# Patient Record
Sex: FEMALE | Race: WHITE | NOT HISPANIC OR LATINO | Employment: OTHER | URBAN - METROPOLITAN AREA
[De-identification: names, ages, dates, MRNs, and addresses within clinical notes are randomized per-mention and may not be internally consistent; named-entity substitution may affect disease eponyms.]

---

## 2017-04-11 DIAGNOSIS — K21.9 GASTROESOPHAGEAL REFLUX DISEASE, ESOPHAGITIS PRESENCE NOT SPECIFIED: ICD-10-CM

## 2017-04-11 RX ORDER — PANTOPRAZOLE SODIUM 20 MG/1
20 TABLET, DELAYED RELEASE ORAL DAILY
Qty: 30 TABLET | Refills: 3 | Status: SHIPPED | OUTPATIENT
Start: 2017-04-11 | End: 2017-10-05 | Stop reason: SDUPTHER

## 2017-06-01 ENCOUNTER — OFFICE VISIT (OUTPATIENT)
Dept: INTERNAL MEDICINE | Facility: CLINIC | Age: 67
End: 2017-06-01
Payer: MEDICARE

## 2017-06-01 VITALS
WEIGHT: 244.06 LBS | HEART RATE: 86 BPM | TEMPERATURE: 97 F | BODY MASS INDEX: 43.24 KG/M2 | OXYGEN SATURATION: 97 % | SYSTOLIC BLOOD PRESSURE: 116 MMHG | HEIGHT: 63 IN | DIASTOLIC BLOOD PRESSURE: 74 MMHG

## 2017-06-01 DIAGNOSIS — Z01.818 PRE-OPERATIVE CLEARANCE: ICD-10-CM

## 2017-06-01 DIAGNOSIS — M17.0 PRIMARY OSTEOARTHRITIS OF BOTH KNEES: Primary | ICD-10-CM

## 2017-06-01 PROCEDURE — 99499 UNLISTED E&M SERVICE: CPT | Mod: S$GLB,,, | Performed by: FAMILY MEDICINE

## 2017-06-01 PROCEDURE — 99213 OFFICE O/P EST LOW 20 MIN: CPT | Mod: S$GLB,,, | Performed by: FAMILY MEDICINE

## 2017-06-01 PROCEDURE — 1126F AMNT PAIN NOTED NONE PRSNT: CPT | Mod: S$GLB,,, | Performed by: FAMILY MEDICINE

## 2017-06-01 PROCEDURE — 1159F MED LIST DOCD IN RCRD: CPT | Mod: S$GLB,,, | Performed by: FAMILY MEDICINE

## 2017-06-01 PROCEDURE — 99999 PR PBB SHADOW E&M-EST. PATIENT-LVL III: CPT | Mod: PBBFAC,,, | Performed by: FAMILY MEDICINE

## 2017-06-01 RX ORDER — ASPIRIN 81 MG/1
81 TABLET ORAL
COMMUNITY
End: 2017-09-14

## 2017-06-01 RX ORDER — MUPIROCIN 20 MG/G
OINTMENT TOPICAL
COMMUNITY
Start: 2017-05-30 | End: 2017-09-14

## 2017-06-01 RX ORDER — ACETAMINOPHEN 500 MG
500 TABLET ORAL
COMMUNITY
End: 2017-09-14

## 2017-06-01 RX ORDER — AMOXICILLIN 500 MG/1
CAPSULE ORAL
COMMUNITY
Start: 2017-05-27 | End: 2017-09-14

## 2017-06-01 NOTE — PROGRESS NOTES
Dory Xie  06/01/2017  [id number]    Eusebia Cohen MD  Patient Care Team:  Eusebia Cohen MD as PCP - General (Family Medicine)  Kely Salazar MD as Consulting Physician (Physical Medicine and Rehabilitation)  Has the patient seen any provider outside of the [facility] network since the last visit? (yes). If yes, HIPPA forms completed and records requested.  Dr. Larson Orth[facility]      Visit Type:Pre op clearance    Chief Complaint:  Chief Complaint   Patient presents with    Pre-op Exam     Surgery June 14, Knee Replacement, Dr.Brent Larson , Tingling In Ears       History of Present Illness:  67 year old here for pre op clearance.  She is patient of Dr. Cohen. This is my first visit with her.  She has history of right knee pain and arthritis. She has been under care of Ortho and has decided to proceed with right total knee.     She denies any issues today.  She has family history of CAD, but herself does not have any.    She takes Protonix for GERD.      She is Amoxil for dental work.  She was positive for MSSA, Dr. Waldrop office sent in Bactroban.       History:  Past Medical History:   Diagnosis Date    Arthritis     Cancer     cervical    Chronic pain     From osteoarthritis    Colitis     GERD (gastroesophageal reflux disease)     Seasonal allergies      Past Surgical History:   Procedure Laterality Date    COLONOSCOPY N/A 9/21/2016    Procedure: COLONOSCOPY;  Surgeon: Hector Summers III, MD;  Location: [facility];  Service: Endoscopy;  Laterality: N/A;    EYE SURGERY      HYSTERECTOMY      TONSILLECTOMY      TUBAL LIGATION       Family History   Problem Relation Age of Onset    Diabetes Mother     Heart disease Mother     Cancer Mother      lung    Cancer Father      Lung    Hyperlipidemia Father     Hypertension       Social History     Social History    Marital status: Single     Spouse name: N/A    Number of children: N/A    Years of education: N/A     Occupational  History    Not on file.     Social History Main Topics    Smoking status: Never Smoker    Smokeless tobacco: Never Used    Alcohol use No    Drug use: No    Sexual activity: Not on file     Other Topics Concern    Not on file     Social History Narrative    No narrative on file     Patient Active Problem List   Diagnosis    Primary osteoarthritis of both knees    Chronic pain    Hyperlipidemia    Seasonal allergies    Osteoporosis, unspecified    BMI of 40.0-44.9, adult    Chest pain, moderate coronary artery risk    Family history of arteriosclerotic cardiovascular disease    Preop cardiovascular exam    Colitis     Review of patient's allergies indicates:  No Known Allergies    The following were reviewed at this visit: active problem list, medication list, allergies, family history, social history, and health maintenance.    Medications:  Current Outpatient Prescriptions on File Prior to Visit   Medication Sig Dispense Refill    calcium-vitamin D 600 mg(1,500mg) -400 unit Tab Take 1 tablet by mouth 2 (two) times daily. 1 Tablet Oral      fluticasone (FLONASE) 50 mcg/actuation nasal spray 1 spray IN EACH NOSTRIL EVERY DAILY 16 g 5    meloxicam (MOBIC) 15 MG tablet Take 1 tablet by mouth once daily.      pantoprazole (PROTONIX) 20 MG tablet Take 1 tablet (20 mg total) by mouth once daily. 30 tablet 3    potassium gluconate 595 (99) mg Tab Take 1 tablet by mouth Daily.      tramadol (ULTRAM) 50 mg tablet Take 1 tablet by mouth 5 (five) times daily.       [DISCONTINUED] VOLTAREN 1 % Gel 1 Tube as needed.      [DISCONTINUED] alprazolam (XANAX) 0.25 MG tablet Take 1 tablet (0.25 mg total) by mouth 3 (three) times daily as needed for Anxiety. 15 tablet 0    [DISCONTINUED] aspirin 81 mg Tab Take 1 tablet by mouth Daily.      [DISCONTINUED] chlorhexidine (PERIDEX) 0.12 % solution        No current facility-administered medications on file prior to visit.        Medications have been reviewed  and reconciled with patient at this visit.  Barriers to medications present (no)    Adverse reactions to current medications (no)    Over the counter medications reviewed (Yes ), and if needed added to active Medication list at this visit.     Exam:  Wt Readings from Last 3 Encounters:   06/01/17 110.7 kg (244 lb 0.8 oz)   09/21/16 101.2 kg (223 lb)   09/12/16 101.4 kg (223 lb 8.7 oz)     Temp Readings from Last 3 Encounters:   06/01/17 97.4 °F (36.3 °C) (Tympanic)   09/21/16 98.4 °F (36.9 °C) (Oral)   09/10/16 98 °F (36.7 °C) (Oral)     BP Readings from Last 3 Encounters:   06/01/17 116/74   09/21/16 (!) 145/74   09/12/16 112/70     Pulse Readings from Last 3 Encounters:   06/01/17 86   09/21/16 61   09/12/16 70     Body mass index is 43.24 kg/m².      Review of Systems   Constitutional: Negative for chills and fever.   HENT: Negative for nosebleeds and sore throat.    Eyes: Negative for blurred vision and double vision.   Respiratory: Negative for cough, hemoptysis and shortness of breath.    Cardiovascular: Negative for chest pain, orthopnea and leg swelling.   Gastrointestinal: Negative for abdominal pain, heartburn, nausea and vomiting.   Genitourinary: Negative for dysuria, frequency and urgency.   Musculoskeletal: Positive for joint pain.   Skin: Negative for itching and rash.   Neurological: Negative for dizziness, tingling, focal weakness and headaches.   Psychiatric/Behavioral: Negative for depression. The patient is not nervous/anxious and does not have insomnia.        Physical Exam   Constitutional: She is oriented to person, place, and time. She appears well-developed and well-nourished. No distress.   HENT:   Head: Atraumatic.   Right Ear: External ear normal.   Left Ear: External ear normal.   Nose: Nose normal.   Mouth/Throat: No oropharyngeal exudate.   Eyes: Conjunctivae and EOM are normal. Pupils are equal, round, and reactive to light.   Neck: Normal range of motion. Neck supple. No thyromegaly  present.   Cardiovascular: Normal rate, regular rhythm and normal heart sounds.    No murmur heard.  Pulmonary/Chest: Effort normal and breath sounds normal. No respiratory distress. She has no wheezes. She exhibits no tenderness.   Abdominal: Soft. Bowel sounds are normal. She exhibits no distension. There is no tenderness. There is no rebound and no guarding.   Musculoskeletal: She exhibits tenderness.   Right knee, lateral joint line. Tenderness with full extension.  No swelling noted. No skin changes.    Lymphadenopathy:     She has no cervical adenopathy.   Neurological: She is alert and oriented to person, place, and time.   Skin: She is not diaphoretic.   Psychiatric: She has a normal mood and affect. Her behavior is normal. Thought content normal.   Nursing note and vitals reviewed.      Laboratory Reviewed ({Yes)  Lab Results   Component Value Date    WBC 5.37 09/10/2016    HGB 11.3 (L) 09/10/2016    HCT 36.3 (L) 09/10/2016     09/10/2016    CHOL 194 08/09/2016    TRIG 132 08/09/2016    HDL 33 (L) 08/09/2016    ALT 15 09/10/2016    AST 13 09/10/2016     09/10/2016    K 4.4 09/10/2016     09/10/2016    CREATININE 0.9 09/10/2016    BUN 18 09/10/2016    CO2 25 09/10/2016    TSH 3.415 02/19/2014    INR 1.1 09/10/2016    HGBA1C 5.4 02/19/2014       Assessment:  The primary encounter diagnosis was Primary osteoarthritis of both knees. A diagnosis of Pre-operative clearance was also pertinent to this visit.     Plan       Primary osteoarthritis of both knees  Pre-operative clearance   [facility].  I personally reviewed pre op labs, and they are accessible with Care Everywhere.   EKG is normal   Chest xray clear   Reviewed medications with patient. Holding NSAID and ASA 7 days prior   Rehab with home health and at home with assistance of daughter   Post op anticoag per Dr. Larson.   She has Bactroban for her nose, 5 days prior to surgery  Deemed adequate risk for right total  knee.      Care Plan/Goals: Reviewed  (Yes)  Goals      Blood Pressure < 140/90      LDL CHOLESTEROL < 130      Weight < 190 lb (86.183 kg)           Follow up: No Follow-up on file.    After visit summary was printed and given to patient upon discharge today.  Patient goals and care plan are included in After Visit Summary.

## 2017-06-01 NOTE — PATIENT INSTRUCTIONS
Understanding Osteoarthritis of the Knee    A joint is a place where two bones meet. The knee is called a hinge joint. This joint is formed where the thighbone (femur) meets the shinbone (tibia). A healthy knee joint bends freely. Knee osteoarthritis is a condition where parts of the knee joint wear out. This can lead to pain, stiffness, and limited movement.   What is osteoarthritis?  Every joint contains a smooth tissue called cartilage. Cartilage cushions the ends of bones and helps bones in a joint glide smoothly against each other. Knee osteoarthritis occurs when cartilage in the knee joint begins to break down and wear away. Bones may become exposed and rub together. The cartilage may become irritated and rough. This prevents smooth movement of the joint and can lead to pain.  Causes of osteoarthritis of the knee  Causes can include:  · Wear and tear from normal use over time  · Overuse of the knee during sports or work activities  · Being overweight. This increases stress on the knee joint.  · Misalignment of the knee joint  · Injury to the knee  Symptoms of osteoarthritis of the knee  Common symptoms include:  · Pain and swelling around the joint. The pain and swelling get worse with activity and better with rest.  · Grinding sound when moving the knee  · Reduced knee movement  · Knee stiffness. This is often worse first thing in the morning.  Treating osteoarthritis of the knee  Osteoarthritis is a long-term condition. Treatment usually focuses on managing symptoms. Treatment may include:  · Over-the-counter or prescription medicines taken by mouth to help relieve pain and swelling  · Injections of medicine into the joint to help relieve symptoms for a time  · A weight-loss plan for people who are overweight  · A plan of physical therapy and exercises to improve the strength and flexibility of the muscles around the knee  · Heat or cold therapy to help relieve pain and stiffness  · Assistive devices that  help with movement, such as a cane or a walker  · Assistive devices that make activities of daily life easier, such as raised toilet seats or shower bars  If other treatments dont do enough to relieve symptoms, you may need surgery to replace the joint. During this surgery, the damaged joint is removed. An artificial knee joint is then put into place. This can help relieve pain and stiffness and restore movement of the knee.     When to call your healthcare provider  Call your healthcare provider right away if you have any of these:  · Fever of 100.4°F (38°C) or higher, or as directed  · Symptoms that dont get better with prescribed medicines or get worse  · New symptoms   Date Last Reviewed: 3/10/2016  © 3699-3006 [employer]. [address] [place] [address], [place] [address]. All rights reserved. This information is not intended as a substitute for professional medical care. Always follow your healthcare professional's instructions.

## 2017-06-13 ENCOUNTER — TELEPHONE (OUTPATIENT)
Dept: INTERNAL MEDICINE | Facility: CLINIC | Age: 67
End: 2017-06-13

## 2017-06-13 NOTE — TELEPHONE ENCOUNTER
----- Message from Carol Babcock sent at 6/13/2017 10:29 AM CDT -----  Contact: Juanis from [facility] to [facility] call rg pt / no other information was provided per juanis and can be reached at [phone]//thanks/dbw

## 2017-06-20 ENCOUNTER — TELEPHONE (OUTPATIENT)
Dept: INTERNAL MEDICINE | Facility: CLINIC | Age: 67
End: 2017-06-20
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
## 2017-06-20 NOTE — TELEPHONE ENCOUNTER
She can get Ditropan now OTC.  Its called   · Oxytrol For Women [OTC]. This should help.  I am assuming she is having some issues because they catheterized her during the surgery.  She can have her friend run to pharmacy and get OTC. If she has any problems, ask the pharmacist and they will show her where it is, its near the AZO on the shelf.  Dr. Davey

## 2017-06-20 NOTE — TELEPHONE ENCOUNTER
----- Message from Alyssa Joshi sent at 6/19/2017  1:26 PM CDT -----  needs meds to help stop bathroom urges, will elaborate..[phone]..  Leon collins

## 2017-06-30 ENCOUNTER — TELEPHONE (OUTPATIENT)
Dept: INTERNAL MEDICINE | Facility: CLINIC | Age: 67
End: 2017-06-30

## 2017-06-30 NOTE — TELEPHONE ENCOUNTER
----- Message from Aislinn Salazar sent at 6/30/2017 10:32 AM CDT -----  Contact: Leon Raines/[place]  Caller wants to see if it's ok to change pt Flonase to Nasonex. Please give [place] a call at [phone]

## 2017-09-12 ENCOUNTER — PATIENT OUTREACH (OUTPATIENT)
Dept: ADMINISTRATIVE | Facility: HOSPITAL | Age: 67
End: 2017-09-12

## 2017-09-12 DIAGNOSIS — M85.80 OSTEOPENIA, UNSPECIFIED LOCATION: ICD-10-CM

## 2017-09-12 DIAGNOSIS — Z12.31 ENCOUNTER FOR SCREENING MAMMOGRAM FOR BREAST CANCER: Primary | ICD-10-CM

## 2017-09-12 DIAGNOSIS — M89.9 BONE DISORDER: ICD-10-CM

## 2017-09-14 ENCOUNTER — APPOINTMENT (OUTPATIENT)
Dept: RADIOLOGY | Facility: CLINIC | Age: 67
End: 2017-09-14
Payer: MEDICARE

## 2017-09-14 ENCOUNTER — OFFICE VISIT (OUTPATIENT)
Dept: INTERNAL MEDICINE | Facility: CLINIC | Age: 67
End: 2017-09-14
Payer: MEDICARE

## 2017-09-14 VITALS
SYSTOLIC BLOOD PRESSURE: 106 MMHG | HEIGHT: 63 IN | DIASTOLIC BLOOD PRESSURE: 72 MMHG | BODY MASS INDEX: 44.14 KG/M2 | HEART RATE: 88 BPM | WEIGHT: 249.13 LBS | TEMPERATURE: 98 F | OXYGEN SATURATION: 99 %

## 2017-09-14 DIAGNOSIS — M89.9 BONE DISORDER: ICD-10-CM

## 2017-09-14 DIAGNOSIS — M81.0 OSTEOPOROSIS, UNSPECIFIED OSTEOPOROSIS TYPE, UNSPECIFIED PATHOLOGICAL FRACTURE PRESENCE: ICD-10-CM

## 2017-09-14 DIAGNOSIS — Z12.31 SCREENING MAMMOGRAM, ENCOUNTER FOR: ICD-10-CM

## 2017-09-14 DIAGNOSIS — Z82.49 FAMILY HISTORY OF ARTERIOSCLEROTIC CARDIOVASCULAR DISEASE: ICD-10-CM

## 2017-09-14 DIAGNOSIS — E66.01 MORBID OBESITY WITH BMI OF 40.0-44.9, ADULT: Chronic | ICD-10-CM

## 2017-09-14 DIAGNOSIS — E78.5 HYPERLIPIDEMIA, UNSPECIFIED HYPERLIPIDEMIA TYPE: ICD-10-CM

## 2017-09-14 DIAGNOSIS — Z00.00 ANNUAL PHYSICAL EXAM: Primary | ICD-10-CM

## 2017-09-14 DIAGNOSIS — M17.0 PRIMARY OSTEOARTHRITIS OF BOTH KNEES: ICD-10-CM

## 2017-09-14 DIAGNOSIS — K52.9 COLITIS: ICD-10-CM

## 2017-09-14 DIAGNOSIS — J30.2 SEASONAL ALLERGIC RHINITIS, UNSPECIFIED CHRONICITY, UNSPECIFIED TRIGGER: ICD-10-CM

## 2017-09-14 DIAGNOSIS — M85.80 OSTEOPENIA, UNSPECIFIED LOCATION: ICD-10-CM

## 2017-09-14 DIAGNOSIS — R42 DIZZINESS: ICD-10-CM

## 2017-09-14 PROCEDURE — 77080 DXA BONE DENSITY AXIAL: CPT | Mod: 26,,, | Performed by: INTERNAL MEDICINE

## 2017-09-14 PROCEDURE — 99999 PR PBB SHADOW E&M-EST. PATIENT-LVL V: CPT | Mod: PBBFAC,,, | Performed by: FAMILY MEDICINE

## 2017-09-14 PROCEDURE — 99499 UNLISTED E&M SERVICE: CPT | Mod: S$GLB,,, | Performed by: FAMILY MEDICINE

## 2017-09-14 PROCEDURE — 1125F AMNT PAIN NOTED PAIN PRSNT: CPT | Mod: S$GLB,,, | Performed by: FAMILY MEDICINE

## 2017-09-14 PROCEDURE — 3008F BODY MASS INDEX DOCD: CPT | Mod: S$GLB,,, | Performed by: FAMILY MEDICINE

## 2017-09-14 PROCEDURE — 99214 OFFICE O/P EST MOD 30 MIN: CPT | Mod: S$GLB,,, | Performed by: FAMILY MEDICINE

## 2017-09-14 PROCEDURE — 1159F MED LIST DOCD IN RCRD: CPT | Mod: S$GLB,,, | Performed by: FAMILY MEDICINE

## 2017-09-14 PROCEDURE — 77080 DXA BONE DENSITY AXIAL: CPT | Mod: TC

## 2017-09-14 RX ORDER — ASPIRIN 325 MG
325 TABLET ORAL
COMMUNITY
Start: 2017-06-15 | End: 2017-09-14

## 2017-09-14 RX ORDER — MECLIZINE HCL 12.5 MG 12.5 MG/1
12.5 TABLET ORAL 3 TIMES DAILY PRN
Qty: 30 TABLET | Refills: 1 | Status: SHIPPED | OUTPATIENT
Start: 2017-09-14 | End: 2018-09-10

## 2017-09-14 NOTE — ASSESSMENT & PLAN NOTE
Dexa now due  Continue Caltrate D  Stopped bisphosponate due to Dentist worried about ON of jaw.

## 2017-09-14 NOTE — ASSESSMENT & PLAN NOTE
Continues with Diarrhea  9/2016 Colon completed after prolonged bout of diarrhea  Impression:           - Diverticulosis in the proximal ascending colon.                        - The examined portion of the ileum was normal.                        - Several biopsies were obtained in the transverse                         colon, in the ascending colon and in the cecum.  Negative biopsy  Continues with diarrhea  Revisit with GI, [facility]  Check CBC, CMP, TSH  Stool studies

## 2017-09-14 NOTE — ASSESSMENT & PLAN NOTE
Lab Results   Component Value Date    CHOL 194 08/09/2016    CHOL 238 (H) 01/07/2016    CHOL 225 (H) 08/07/2014     Lab Results   Component Value Date    HDL 33 (L) 08/09/2016    HDL 35 (L) 01/07/2016    HDL 47 08/07/2014     Lab Results   Component Value Date    LDLCALC 134.6 08/09/2016    LDLCALC 166.2 (H) 01/07/2016    LDLCALC 151.4 08/07/2014     Lab Results   Component Value Date    TRIG 132 08/09/2016    TRIG 184 (H) 01/07/2016    TRIG 133 08/07/2014     Lab Results   Component Value Date    CHOLHDL 17.0 (L) 08/09/2016    CHOLHDL 14.7 (L) 01/07/2016    CHOLHDL 20.9 08/07/2014     Annual lipid due  Last LDL near goal  Diet controlled at this point  Recheck

## 2017-09-14 NOTE — PROGRESS NOTES
Dory Xei  09/14/2017  [id number]    Makenna Davey MD  Patient Care Team:  Makenna Davey MD as PCP - General (Family Medicine)  Kely Salazar MD as Consulting Physician (Physical Medicine and Rehabilitation)  Has the patient seen any provider outside of the [facility] network since the last visit? (no). If yes, HIPPA forms completed and records requested.        Visit Type:Establish with new doctor, check up, continued problems    Chief Complaint:  Chief Complaint   Patient presents with    Nausea    Diarrhea     Takes OTC Anti-Diarrhea Meds    Dizziness    Tinnitus       History of Present Illness:  67 year old here for recheck.  She is complianing of diarrhea, that has been present for almost a year on and off.  She had colon screen last Sept for diarrhea. Negative for malignancy or pathology.    She is also complaining of dizziness. She has ringing in the EAR. Dizziness used to be when getting up and out of bed. She reports dizziness feels more predominant with living her her daughter house, its up in the air.    Due for DEXA, MMG          History:  Past Medical History:   Diagnosis Date    Arthritis     Cancer     cervical    Chronic pain     From osteoarthritis    Colitis     GERD (gastroesophageal reflux disease)     Seasonal allergies      Past Surgical History:   Procedure Laterality Date    COLONOSCOPY N/A 9/21/2016    Procedure: COLONOSCOPY;  Surgeon: Hector Summers III, MD;  Location: [facility];  Service: Endoscopy;  Laterality: N/A;    EYE SURGERY      HYSTERECTOMY      TONSILLECTOMY      TUBAL LIGATION       Family History   Problem Relation Age of Onset    Diabetes Mother     Heart disease Mother     Cancer Mother      lung    Cancer Father      Lung    Hyperlipidemia Father     Hypertension       Social History     Social History    Marital status: Single     Spouse name: N/A    Number of children: N/A    Years of education: N/A     Occupational History    Not on  file.     Social History Main Topics    Smoking status: Never Smoker    Smokeless tobacco: Never Used    Alcohol use No    Drug use: No    Sexual activity: Not on file     Other Topics Concern    Not on file     Social History Narrative    No narrative on file     Patient Active Problem List   Diagnosis    Primary osteoarthritis of both knees    Chronic pain    Hyperlipidemia    Seasonal allergies    Osteoporosis    BMI of 40.0-44.9, adult    Family history of arteriosclerotic cardiovascular disease    Colitis    Vertigo     Review of patient's allergies indicates:  No Known Allergies    The following were reviewed at this visit: active problem list, medication list, allergies, family history, social history, and health maintenance.    Medications:  Current Outpatient Prescriptions on File Prior to Visit   Medication Sig Dispense Refill    fluticasone (FLONASE) 50 mcg/actuation nasal spray 1 spray IN EACH NOSTRIL EVERY DAILY 16 g 5    meloxicam (MOBIC) 15 MG tablet Take 1 tablet by mouth once daily.      pantoprazole (PROTONIX) 20 MG tablet Take 1 tablet (20 mg total) by mouth once daily. 30 tablet 3    potassium gluconate 595 (99) mg Tab Take 1 tablet by mouth Daily.      [DISCONTINUED] acetaminophen (TYLENOL) 500 MG tablet Take 500 mg by mouth.      [DISCONTINUED] amoxicillin (AMOXIL) 500 MG capsule       [DISCONTINUED] aspirin (ECOTRIN) 81 MG EC tablet Take 81 mg by mouth.      [DISCONTINUED] calcium-vitamin D 600 mg(1,500mg) -400 unit Tab Take 1 tablet by mouth 2 (two) times daily. 1 Tablet Oral      [DISCONTINUED] mupirocin (BACTROBAN) 2 % ointment       [DISCONTINUED] tramadol (ULTRAM) 50 mg tablet Take 1 tablet by mouth 5 (five) times daily.        No current facility-administered medications on file prior to visit.        Medications have been reviewed and reconciled with patient at this visit.  Barriers to medications present (no)    Adverse reactions to current medications  (yes)    Over the counter medications reviewed (No ), and if needed added to active Medication list at this visit.     Exam:  Wt Readings from Last 3 Encounters:   09/14/17 113 kg (249 lb 1.6 oz)   06/01/17 110.7 kg (244 lb 0.8 oz)   09/21/16 101.2 kg (223 lb)     Temp Readings from Last 3 Encounters:   09/14/17 97.5 °F (36.4 °C) (Tympanic)   06/01/17 97.4 °F (36.3 °C) (Tympanic)   09/21/16 98.4 °F (36.9 °C) (Oral)     BP Readings from Last 3 Encounters:   09/14/17 106/72   06/01/17 116/74   09/21/16 (!) 145/74     Pulse Readings from Last 3 Encounters:   09/14/17 88   06/01/17 86   09/21/16 61     Body mass index is 44.14 kg/m².      Review of Systems   Constitutional: Negative.  Negative for chills and fever.   HENT: Positive for tinnitus. Negative for congestion, sinus pain and sore throat.    Eyes: Negative for blurred vision and double vision.   Respiratory: Negative for cough, sputum production, shortness of breath and wheezing.    Cardiovascular: Negative for chest pain, palpitations and leg swelling.   Gastrointestinal: Positive for diarrhea. Negative for abdominal pain, constipation, heartburn, nausea and vomiting.   Genitourinary: Negative.    Musculoskeletal: Negative.    Skin: Negative.  Negative for rash.   Neurological: Positive for dizziness. Negative for tingling and headaches.   Endo/Heme/Allergies: Negative.  Negative for polydipsia. Does not bruise/bleed easily.   Psychiatric/Behavioral: Negative for depression and substance abuse.       Physical Exam   Constitutional: She is oriented to person, place, and time. She appears well-developed and well-nourished. No distress.   HENT:   Head: Normocephalic and atraumatic.   Right Ear: External ear normal.   Left Ear: External ear normal.   Nose: Nose normal.   Mouth/Throat: Oropharynx is clear and moist. No oropharyngeal exudate.   Eyes: Conjunctivae and EOM are normal. Pupils are equal, round, and reactive to light. Right eye exhibits no discharge.  Left eye exhibits no discharge.   Neck: Normal range of motion. Neck supple. No thyromegaly present.   Cardiovascular: Normal rate, regular rhythm, normal heart sounds and intact distal pulses.    No murmur heard.  Pulmonary/Chest: Effort normal and breath sounds normal. No respiratory distress. She has no wheezes.   Abdominal: Soft. Bowel sounds are normal. She exhibits no distension and no mass. There is no tenderness.   Musculoskeletal: Normal range of motion. She exhibits no edema.   Lymphadenopathy:     She has no cervical adenopathy.   Neurological: She is alert and oriented to person, place, and time. No cranial nerve deficit.   Skin: Capillary refill takes less than 2 seconds. She is not diaphoretic.   Psychiatric: She has a normal mood and affect. Her behavior is normal. Judgment and thought content normal.   Nursing note and vitals reviewed.      Laboratory Reviewed ({N/A)  Lab Results   Component Value Date    WBC 5.37 09/10/2016    HGB 11.3 (L) 09/10/2016    HCT 36.3 (L) 09/10/2016     09/10/2016    CHOL 194 08/09/2016    TRIG 132 08/09/2016    HDL 33 (L) 08/09/2016    ALT 15 09/10/2016    AST 13 09/10/2016     09/10/2016    K 4.4 09/10/2016     09/10/2016    CREATININE 0.9 09/10/2016    BUN 18 09/10/2016    CO2 25 09/10/2016    TSH 3.415 02/19/2014    INR 1.1 09/10/2016    HGBA1C 5.4 02/19/2014       Assessment:  The primary encounter diagnosis was Annual physical exam. Diagnoses of Hyperlipidemia, unspecified hyperlipidemia type, Seasonal allergic rhinitis, unspecified chronicity, unspecified trigger, BMI of 40.0-44.9, adult, Colitis, Primary osteoarthritis of both knees, Osteoporosis, unspecified osteoporosis type, unspecified pathological fracture presence, Family history of arteriosclerotic cardiovascular disease, Dizziness, and Screening mammogram, encounter for were also pertinent to this visit.     Plan  Hyperlipidemia  Lab Results   Component Value Date    CHOL 194 08/09/2016     CHOL 238 (H) 01/07/2016    CHOL 225 (H) 08/07/2014     Lab Results   Component Value Date    HDL 33 (L) 08/09/2016    HDL 35 (L) 01/07/2016    HDL 47 08/07/2014     Lab Results   Component Value Date    LDLCALC 134.6 08/09/2016    LDLCALC 166.2 (H) 01/07/2016    LDLCALC 151.4 08/07/2014     Lab Results   Component Value Date    TRIG 132 08/09/2016    TRIG 184 (H) 01/07/2016    TRIG 133 08/07/2014     Lab Results   Component Value Date    CHOLHDL 17.0 (L) 08/09/2016    CHOLHDL 14.7 (L) 01/07/2016    CHOLHDL 20.9 08/07/2014     Annual lipid due  Last LDL near goal  Diet controlled at this point  Recheck      Seasonal allergies  Currently controlled OTC medications  Continue Flonase for seasonal exacerbations      BMI of 40.0-44.9, adult  BMI reviewed  Diet and exercise discussed      Colitis  Continues with Diarrhea  9/2016 Colon completed after prolonged bout of diarrhea  Impression:           - Diverticulosis in the proximal ascending colon.                        - The examined portion of the ileum was normal.                        - Several biopsies were obtained in the transverse                         colon, in the ascending colon and in the cecum.  Negative biopsy  Continues with diarrhea  Revisit with GI, or second opinion [facility]  Check CBC, CMP, TSH  Stool studies    Primary osteoarthritis of both knees  Chronic  Mobic for NSAID USE  Checking annual renal function    Osteoporosis  Dexa now due  Continue Caltrate D  Stopped bisphosponate due to Dentist worried about ON of jaw.      Family history of arteriosclerotic cardiovascular disease  BP control  Lipid recheck  On ASA  Cardiac Diet, low cholesterol  Risk reduction      Vertigo  Referral to [facility]  Antivert for dizziness      Flu vaccine at pharmacy today      Care Plan/Goals: Reviewed  (Yes)  Goals      Blood Pressure < 140/90      LDL CHOLESTEROL < 130      Weight < 190 lb (86.183 kg)           Follow up: Return in about  6 months (around 3/14/2018).    After visit summary was printed and given to patient upon discharge today.  Patient goals and care plan are included in After Visit Summary.

## 2017-09-15 ENCOUNTER — LAB VISIT (OUTPATIENT)
Dept: LAB | Facility: HOSPITAL | Age: 67
End: 2017-09-15
Payer: MEDICARE

## 2017-09-15 DIAGNOSIS — Z00.00 ANNUAL PHYSICAL EXAM: ICD-10-CM

## 2017-09-15 DIAGNOSIS — K52.9 COLITIS: ICD-10-CM

## 2017-09-15 DIAGNOSIS — E78.5 HYPERLIPIDEMIA, UNSPECIFIED HYPERLIPIDEMIA TYPE: ICD-10-CM

## 2017-09-15 LAB
ALBUMIN SERPL BCP-MCNC: 3.3 G/DL
ALP SERPL-CCNC: 78 U/L
ALT SERPL W/O P-5'-P-CCNC: 12 U/L
ANION GAP SERPL CALC-SCNC: 9 MMOL/L
AST SERPL-CCNC: 11 U/L
BILIRUB SERPL-MCNC: 0.5 MG/DL
BUN SERPL-MCNC: 24 MG/DL
CALCIUM SERPL-MCNC: 9.1 MG/DL
CHLORIDE SERPL-SCNC: 108 MMOL/L
CHOLEST SERPL-MCNC: 179 MG/DL
CHOLEST/HDLC SERPL: 5.3 {RATIO}
CO2 SERPL-SCNC: 24 MMOL/L
CREAT SERPL-MCNC: 0.9 MG/DL
EST. GFR  (AFRICAN AMERICAN): >60 ML/MIN/1.73 M^2
EST. GFR  (NON AFRICAN AMERICAN): >60 ML/MIN/1.73 M^2
GLUCOSE SERPL-MCNC: 100 MG/DL
HDLC SERPL-MCNC: 34 MG/DL
HDLC SERPL: 19 %
LDLC SERPL CALC-MCNC: 115.8 MG/DL
NONHDLC SERPL-MCNC: 145 MG/DL
POTASSIUM SERPL-SCNC: 4.3 MMOL/L
PROT SERPL-MCNC: 7.1 G/DL
SODIUM SERPL-SCNC: 141 MMOL/L
TRIGL SERPL-MCNC: 146 MG/DL
TSH SERPL DL<=0.005 MIU/L-ACNC: 1.76 UIU/ML

## 2017-09-15 PROCEDURE — 80053 COMPREHEN METABOLIC PANEL: CPT

## 2017-09-15 PROCEDURE — 84443 ASSAY THYROID STIM HORMONE: CPT

## 2017-09-15 PROCEDURE — 85025 COMPLETE CBC W/AUTO DIFF WBC: CPT

## 2017-09-15 PROCEDURE — 80061 LIPID PANEL: CPT

## 2017-09-15 PROCEDURE — 36415 COLL VENOUS BLD VENIPUNCTURE: CPT

## 2017-09-16 LAB
BASOPHILS # BLD AUTO: 0.03 K/UL
BASOPHILS NFR BLD: 0.5 %
DIFFERENTIAL METHOD: ABNORMAL
EOSINOPHIL # BLD AUTO: 0.1 K/UL
EOSINOPHIL NFR BLD: 2.2 %
ERYTHROCYTE [DISTWIDTH] IN BLOOD BY AUTOMATED COUNT: 17.7 %
HCT VFR BLD AUTO: 37.6 %
HGB BLD-MCNC: 11.5 G/DL
LYMPHOCYTES # BLD AUTO: 1.2 K/UL
LYMPHOCYTES NFR BLD: 21.1 %
MCH RBC QN AUTO: 25.1 PG
MCHC RBC AUTO-ENTMCNC: 30.6 G/DL
MCV RBC AUTO: 82 FL
MONOCYTES # BLD AUTO: 0.4 K/UL
MONOCYTES NFR BLD: 6.5 %
NEUTROPHILS # BLD AUTO: 3.9 K/UL
NEUTROPHILS NFR BLD: 69.7 %
PLATELET # BLD AUTO: 172 K/UL
PMV BLD AUTO: 9.6 FL
RBC # BLD AUTO: 4.58 M/UL
WBC # BLD AUTO: 5.54 K/UL

## 2017-09-28 ENCOUNTER — HOSPITAL ENCOUNTER (OUTPATIENT)
Dept: RADIOLOGY | Facility: HOSPITAL | Age: 67
Discharge: HOME | End: 2017-09-28
Attending: FAMILY MEDICINE
Payer: MEDICARE

## 2017-09-28 VITALS — HEIGHT: 62 IN | BODY MASS INDEX: 45.82 KG/M2 | WEIGHT: 249 LBS

## 2017-09-28 DIAGNOSIS — Z12.31 SCREENING MAMMOGRAM, ENCOUNTER FOR: ICD-10-CM

## 2017-09-28 PROCEDURE — 77067 SCR MAMMO BI INCL CAD: CPT | Mod: 26,,, | Performed by: RADIOLOGY

## 2017-09-28 PROCEDURE — 77067 SCR MAMMO BI INCL CAD: CPT | Mod: TC

## 2017-10-02 RX ORDER — GABAPENTIN 300 MG/1
CAPSULE ORAL
COMMUNITY
Start: 2017-08-01 | End: 2017-11-29

## 2017-10-05 DIAGNOSIS — K21.9 GASTROESOPHAGEAL REFLUX DISEASE, ESOPHAGITIS PRESENCE NOT SPECIFIED: ICD-10-CM

## 2017-10-08 RX ORDER — PANTOPRAZOLE SODIUM 20 MG/1
TABLET, DELAYED RELEASE ORAL
Qty: 30 TABLET | Refills: 1 | Status: SHIPPED | OUTPATIENT
Start: 2017-10-08 | End: 2018-01-02 | Stop reason: SDUPTHER

## 2017-11-20 ENCOUNTER — TELEPHONE (OUTPATIENT)
Dept: INTERNAL MEDICINE | Facility: CLINIC | Age: 67
End: 2017-11-20

## 2017-11-20 RX ORDER — PROMETHAZINE HYDROCHLORIDE AND DEXTROMETHORPHAN HYDROBROMIDE 6.25; 15 MG/5ML; MG/5ML
5 SYRUP ORAL 3 TIMES DAILY
Qty: 150 ML | Refills: 0 | Status: SHIPPED | OUTPATIENT
Start: 2017-11-20 | End: 2017-11-21 | Stop reason: SDUPTHER

## 2017-11-20 NOTE — TELEPHONE ENCOUNTER
----- Message from Aislinn Salazar sent at 11/20/2017 10:28 AM CST -----  Contact: Pt  Pt wants to see If something can be called in for congestion and green mucus. Please give pt  A call at ..[phone] (home)         [facility] [place] [facility] [address] [place] [place]  [address] [place]  [place] [address]  Phone: [phone] Fax: [phone]

## 2017-11-20 NOTE — TELEPHONE ENCOUNTER
Pt would like something for the cough,hoarse, coughing up green   Thinks she night get an infection  [facility] ([place])

## 2017-11-20 NOTE — TELEPHONE ENCOUNTER
Spoke with pt, C/O coughing, green mucus, congestion, since Friday, no wheezing,no sob, no fever, no chills. Has been taking and old Rx cough med, promethazine-smp-dm, and mucinex 12 hour. Pt says that she did not want to come in today because the mucinex 12 hour makes her drowsy.  [facility] ([place])

## 2017-11-20 NOTE — TELEPHONE ENCOUNTER
What is she asking for?  Does she need more cough meds?  No fever. Only been 3 days of cough, no SOB or wheeze.

## 2017-11-21 ENCOUNTER — TELEPHONE (OUTPATIENT)
Dept: GASTROENTEROLOGY | Facility: CLINIC | Age: 67
End: 2017-11-21

## 2017-11-21 ENCOUNTER — TELEPHONE (OUTPATIENT)
Dept: INTERNAL MEDICINE | Facility: CLINIC | Age: 67
End: 2017-11-21

## 2017-11-21 RX ORDER — PROMETHAZINE HYDROCHLORIDE AND DEXTROMETHORPHAN HYDROBROMIDE 6.25; 15 MG/5ML; MG/5ML
5 SYRUP ORAL 3 TIMES DAILY
Qty: 150 ML | Refills: 0 | Status: SHIPPED | OUTPATIENT
Start: 2017-11-21 | End: 2017-11-29 | Stop reason: ALTCHOICE

## 2017-11-21 NOTE — TELEPHONE ENCOUNTER
----- Message from Carol Babcock sent at 11/21/2017 10:33 AM CST -----  Contact: ptr   States she's calling to follow up on her medicine , cough syrup called in to her pharm. Was told yesterday it would be called in and wants to have it called in to her pharm today and can be reached at [phone]//[email or web address] [facility] - [place] - [address] [place] [place]  [address]  [place] [address]  Phone: [phone] Fax: [phone]

## 2017-11-21 NOTE — TELEPHONE ENCOUNTER
----- Message from Carol Babcock sent at 11/21/2017 10:39 AM CST -----  Contact: pt   States she's calling rg wanting to come in for diarrhea and discuss getting a colonoscopy and can be reached at [phone]//[email or web address]

## 2017-11-29 ENCOUNTER — OFFICE VISIT (OUTPATIENT)
Dept: GASTROENTEROLOGY | Facility: CLINIC | Age: 67
End: 2017-11-29
Payer: MEDICARE

## 2017-11-29 VITALS
WEIGHT: 260.13 LBS | DIASTOLIC BLOOD PRESSURE: 72 MMHG | SYSTOLIC BLOOD PRESSURE: 142 MMHG | HEIGHT: 62 IN | HEART RATE: 88 BPM | BODY MASS INDEX: 47.87 KG/M2

## 2017-11-29 DIAGNOSIS — K58.0 IRRITABLE BOWEL SYNDROME WITH DIARRHEA: Primary | ICD-10-CM

## 2017-11-29 PROCEDURE — 99999 PR PBB SHADOW E&M-EST. PATIENT-LVL III: CPT | Mod: PBBFAC,,, | Performed by: NURSE PRACTITIONER

## 2017-11-29 PROCEDURE — 99214 OFFICE O/P EST MOD 30 MIN: CPT | Mod: S$GLB,,, | Performed by: NURSE PRACTITIONER

## 2017-11-29 RX ORDER — DICYCLOMINE HYDROCHLORIDE 20 MG/1
20 TABLET ORAL 3 TIMES DAILY PRN
Qty: 30 TABLET | Refills: 0 | Status: SHIPPED | OUTPATIENT
Start: 2017-11-29 | End: 2017-12-29
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
## 2017-12-07 RX ORDER — MOMETASONE FUROATE 50 UG/1
SPRAY, METERED NASAL
Qty: 17 G | Refills: 4 | Status: SHIPPED | OUTPATIENT
Start: 2017-12-07 | End: 2018-09-10

## 2018-01-02 DIAGNOSIS — K21.9 GASTROESOPHAGEAL REFLUX DISEASE, ESOPHAGITIS PRESENCE NOT SPECIFIED: ICD-10-CM

## 2018-01-02 RX ORDER — PANTOPRAZOLE SODIUM 20 MG/1
20 TABLET, DELAYED RELEASE ORAL DAILY
Qty: 30 TABLET | Refills: 1 | Status: SHIPPED | OUTPATIENT
Start: 2018-01-02 | End: 2018-03-01 | Stop reason: SDUPTHER

## 2018-01-16 ENCOUNTER — NURSE TRIAGE (OUTPATIENT)
Dept: ADMINISTRATIVE | Facility: CLINIC | Age: 68
End: 2018-01-16

## 2018-01-16 NOTE — TELEPHONE ENCOUNTER
Reason for Disposition   Sinus congestion (pressure, fullness) present > 10 days    Protocols used: ST SINUS PAIN AND CONGESTION-A-OH

## 2018-01-16 NOTE — TELEPHONE ENCOUNTER
Has she been using any nasal antihistamine or nasal steroid?  I would do Flonase, and use Claritin Non drowsy, daily for allergies. Notify her of this.  They are both OTC, and she does not need an Rx.

## 2018-01-16 NOTE — TELEPHONE ENCOUNTER
Spoke with pt, c/o going to urgent care on Sunday, got a shot and antibiotics. Felt a little better on yesterday.  Was told she had a sinus inf. Been having this problem since Thanksgiving on/off. Took otc meds. No fever, no chills.  Pt thinks it could be allergies. Can you recommend any non-drowsy allergy meds or does she need another rx medication please advise?  [facility] ([place])

## 2018-02-22 ENCOUNTER — TELEPHONE (OUTPATIENT)
Dept: INTERNAL MEDICINE | Facility: CLINIC | Age: 68
End: 2018-02-22

## 2018-02-26 NOTE — TELEPHONE ENCOUNTER
Spoke with pt, offered appts for this week. Pt to call to see if she can reschedule PT so she can schedule this week. Pt to call back. Encouraged pt to call asap to avoid delays.

## 2018-02-27 NOTE — PROGRESS NOTES
Dory Xie  02/28/2018  [id number]    Makenna Davey MD  Patient Care Team:  Makenna Davey MD as PCP - General (Family Medicine)  Kely Salazar MD as Consulting Physician (Physical Medicine and Rehabilitation)  Has the patient seen any provider outside of the [facility] network since the last visit? (no). If yes, HIPPA forms completed and records requested.        Visit Type:a scheduled routine follow-up visit    Chief Complaint:  Chief Complaint   Patient presents with    Pre-op Exam       History of Present Illness:    Patient here for pre op clearance. She is having total knee done.  No history of previous issues with general anesthesia.     She has history of knee arthritis    She has HLD. Diet controlled at this time.    She has had issues with Benign vertigo and has been treated with antivert prn which does help.     Colon screen done last fall for a non specific colitis. Currently not complaining of any diarrhea.         History:  Past Medical History:   Diagnosis Date    Arthritis     Cancer     cervical    Chronic pain     From osteoarthritis    Colitis     GERD (gastroesophageal reflux disease)     Seasonal allergies      Past Surgical History:   Procedure Laterality Date    COLONOSCOPY N/A 9/21/2016    Procedure: COLONOSCOPY;  Surgeon: Hector Summers III, MD;  Location: [facility];  Service: Endoscopy;  Laterality: N/A;    EYE SURGERY      HYSTERECTOMY      TONSILLECTOMY      TUBAL LIGATION       Family History   Problem Relation Age of Onset    Diabetes Mother     Heart disease Mother     Cancer Mother      lung    Cancer Father      Lung    Hyperlipidemia Father     Hypertension       Social History     Social History    Marital status: Single     Spouse name: N/A    Number of children: N/A    Years of education: N/A     Occupational History    Not on file.     Social History Main Topics    Smoking status: Never Smoker    Smokeless tobacco: Never Used    Alcohol use No     Drug use: No    Sexual activity: Not on file     Other Topics Concern    Not on file     Social History Narrative    No narrative on file     Patient Active Problem List   Diagnosis    Primary osteoarthritis of both knees    Chronic pain    Hyperlipidemia    Seasonal allergies    Osteoporosis    BMI of 40.0-44.9, adult    Family history of arteriosclerotic cardiovascular disease    Colitis    Vertigo     Review of patient's allergies indicates:  No Known Allergies    The following were reviewed at this visit: active problem list, medication list, allergies, family history, social history, and health maintenance.    Medications:  Current Outpatient Prescriptions on File Prior to Visit   Medication Sig Dispense Refill    fluticasone (FLONASE) 50 mcg/actuation nasal spray 1 spray IN EACH NOSTRIL EVERY DAILY 16 g 5    meclizine (ANTIVERT) 12.5 mg tablet Take 1 tablet (12.5 mg total) by mouth 3 (three) times daily as needed. 30 tablet 1    meloxicam (MOBIC) 15 MG tablet Take 1 tablet by mouth once daily.      mometasone (NASONEX) 50 mcg/actuation nasal spray USE 1 SPRAY IN EACH NOSTRIL EVERY DAY 17 g 4    pantoprazole (PROTONIX) 20 MG tablet Take 1 tablet (20 mg total) by mouth once daily. 30 tablet 1    potassium gluconate 595 (99) mg Tab Take 1 tablet by mouth Daily.       No current facility-administered medications on file prior to visit.        Medications have been reviewed and reconciled with patient at this visit.  Barriers to medications present (no)    Adverse reactions to current medications (no)    Over the counter medications reviewed (Yes ), and if needed added to active Medication list at this visit.     Exam:  Wt Readings from Last 3 Encounters:   02/28/18 117.2 kg (258 lb 6.1 oz)   11/29/17 118 kg (260 lb 2.3 oz)   09/28/17 112.9 kg (249 lb)     Temp Readings from Last 3 Encounters:   02/28/18 97.6 °F (36.4 °C) (Tympanic)   09/14/17 97.5 °F (36.4 °C) (Tympanic)   06/01/17 97.4 °F  (36.3 °C) (Tympanic)     BP Readings from Last 3 Encounters:   02/28/18 138/60   11/29/17 (!) 142/72   09/14/17 106/72     Pulse Readings from Last 3 Encounters:   02/28/18 101   11/29/17 88   09/14/17 88     Body mass index is 45.77 kg/m².    Review of Systems   Constitutional: Negative.  Negative for chills and fever.   HENT: Negative.  Negative for congestion, sinus pain and sore throat.    Eyes: Negative for blurred vision and double vision.   Respiratory: Negative for cough, sputum production, shortness of breath and wheezing.    Cardiovascular: Negative for chest pain, palpitations and leg swelling.   Gastrointestinal: Negative for abdominal pain, constipation, diarrhea, heartburn, nausea and vomiting.   Genitourinary: Negative.    Musculoskeletal: Positive for joint pain.   Skin: Negative.  Negative for rash.   Neurological: Negative.    Endo/Heme/Allergies: Negative.  Negative for polydipsia. Does not bruise/bleed easily.   Psychiatric/Behavioral: Negative for depression and substance abuse.         Physical Exam   Constitutional: She is oriented to person, place, and time. She appears well-developed and well-nourished. No distress.   HENT:   Head: Normocephalic and atraumatic.   Right Ear: External ear normal.   Left Ear: External ear normal.   Nose: Nose normal.   Mouth/Throat: Oropharynx is clear and moist. No oropharyngeal exudate.   Eyes: Conjunctivae and EOM are normal. Pupils are equal, round, and reactive to light. Right eye exhibits no discharge. Left eye exhibits no discharge.   Neck: Normal range of motion. Neck supple. No thyromegaly present.   Cardiovascular: Normal rate, regular rhythm, normal heart sounds and intact distal pulses.    No murmur heard.  Pulmonary/Chest: Effort normal and breath sounds normal. No respiratory distress. She has no wheezes.   Abdominal: Soft. Bowel sounds are normal. She exhibits no distension and no mass. There is no tenderness.   Musculoskeletal: Normal range of  motion. She exhibits no edema.        Left knee: She exhibits normal range of motion. Tenderness found.        Legs:  Right knee replacement- historical    Left knee, medial joint line pain  Deviation of the foot, to external.     Lymphadenopathy:     She has no cervical adenopathy.   Neurological: She is alert and oriented to person, place, and time. No cranial nerve deficit.   Skin: Capillary refill takes less than 2 seconds. She is not diaphoretic.   Psychiatric: She has a normal mood and affect. Her behavior is normal. Judgment and thought content normal.   Nursing note and vitals reviewed.      Laboratory Reviewed ({Yes)  Lab Results   Component Value Date    WBC 5.54 09/15/2017    HGB 11.5 (L) 09/15/2017    HCT 37.6 09/15/2017     09/15/2017    CHOL 179 09/15/2017    TRIG 146 09/15/2017    HDL 34 (L) 09/15/2017    ALT 12 09/15/2017    AST 11 09/15/2017     09/15/2017    K 4.3 09/15/2017     09/15/2017    CREATININE 0.9 09/15/2017    BUN 24 (H) 09/15/2017    CO2 24 09/15/2017    TSH 1.758 09/15/2017    INR 1.1 09/10/2016    HGBA1C 5.4 02/19/2014       Assessment:  The primary encounter diagnosis was Pre-operative clearance. Diagnoses of Primary osteoarthritis of both knees and BMI of 40.0-44.9, adult were also pertinent to this visit.     Plan     Pre op labs and EKG reviewed from [facility].  Paperwork completed     reviewed  Deferred tdap for now. Will need to get at Pharmacy for coverage.     Diet and exercise,  reviewed.   Annual due in Sept 2018    Care Plan/Goals: Reviewed  (Yes)  Goals      Blood Pressure < 140/90      LDL CHOLESTEROL < 130      Weight < 190 lb (86.183 kg)           Follow up: No Follow-up on file.    After visit summary was printed and given to patient upon discharge today.  Patient goals and care plan are included in After Visit Summary.

## 2018-02-28 ENCOUNTER — OFFICE VISIT (OUTPATIENT)
Dept: INTERNAL MEDICINE | Facility: CLINIC | Age: 68
End: 2018-02-28
Payer: MEDICARE

## 2018-02-28 VITALS
TEMPERATURE: 98 F | DIASTOLIC BLOOD PRESSURE: 60 MMHG | SYSTOLIC BLOOD PRESSURE: 138 MMHG | WEIGHT: 258.38 LBS | HEART RATE: 101 BPM | OXYGEN SATURATION: 96 % | HEIGHT: 63 IN | BODY MASS INDEX: 45.78 KG/M2 | RESPIRATION RATE: 18 BRPM

## 2018-02-28 DIAGNOSIS — E66.01 MORBID OBESITY WITH BMI OF 40.0-44.9, ADULT: Chronic | ICD-10-CM

## 2018-02-28 DIAGNOSIS — M17.0 PRIMARY OSTEOARTHRITIS OF BOTH KNEES: ICD-10-CM

## 2018-02-28 DIAGNOSIS — Z01.818 PRE-OPERATIVE CLEARANCE: Primary | ICD-10-CM

## 2018-02-28 DIAGNOSIS — J30.2 SEASONAL ALLERGIC RHINITIS, UNSPECIFIED CHRONICITY, UNSPECIFIED TRIGGER: ICD-10-CM

## 2018-02-28 DIAGNOSIS — K21.9 GASTROESOPHAGEAL REFLUX DISEASE, ESOPHAGITIS PRESENCE NOT SPECIFIED: ICD-10-CM

## 2018-02-28 PROCEDURE — 1125F AMNT PAIN NOTED PAIN PRSNT: CPT | Mod: S$GLB,,, | Performed by: FAMILY MEDICINE

## 2018-02-28 PROCEDURE — 3008F BODY MASS INDEX DOCD: CPT | Mod: S$GLB,,, | Performed by: FAMILY MEDICINE

## 2018-02-28 PROCEDURE — 99213 OFFICE O/P EST LOW 20 MIN: CPT | Mod: S$GLB,,, | Performed by: FAMILY MEDICINE

## 2018-02-28 PROCEDURE — 1159F MED LIST DOCD IN RCRD: CPT | Mod: S$GLB,,, | Performed by: FAMILY MEDICINE

## 2018-02-28 PROCEDURE — 99999 PR PBB SHADOW E&M-EST. PATIENT-LVL III: CPT | Mod: PBBFAC,,, | Performed by: FAMILY MEDICINE

## 2018-02-28 NOTE — PATIENT INSTRUCTIONS
What is Arthritis?  Arthritis is a disease that affects the joints (the parts where bones meet and move). It can affect any joint in your body. There are many types of arthritis, including osteoarthritis and rheumatoid arthrtitis. If your symptoms are mild, medications may be enough to reduce pain and swelling. For more severe arthritis, surgery may be needed to improve the condition of the joint or replace the joint entirely.                  What causes arthritis?  Cartilage is a smooth substance that protects the ends of your bones and provides cushioning. When you have arthritis, this cartilage breaks down and can no longer protect your bones. The bones rub against each other, causing pain and swelling. Over time, bone spurs (small pieces of rough or splintered bone) may develop, and the joint's range of motion can become limited.  Symptoms  Some of the more common symptoms of arthritis include:  · Joint pain and stiffness. Pain and stiffness get worse with long periods of rest or using a joint too long or too hard.  · Joints that have lost normal shape and motion.  · Tender, inflamed joints. They may look red and feel warm.  · Grinding or popping noise with joint movement.   · Feeling tired all the time.  Reducing symptoms  Following a healthy lifestyle by losing weight and exercising can help reduce symptoms of osteoarthritis. Medicines can be very helpful for arthritis.     Date Last Reviewed: 9/10/2015  © 8069-4169 [employer]. [address] [place] [address], [place] [address]. All rights reserved. This information is not intended as a substitute for professional medical care. Always follow your healthcare professional's instructions.

## 2018-03-01 RX ORDER — FLUTICASONE PROPIONATE 50 MCG
2 SPRAY, SUSPENSION (ML) NASAL DAILY
Qty: 16 G | Refills: 5 | Status: SHIPPED | OUTPATIENT
Start: 2018-03-01 | End: 2018-03-20 | Stop reason: SDUPTHER

## 2018-03-01 RX ORDER — PANTOPRAZOLE SODIUM 20 MG/1
20 TABLET, DELAYED RELEASE ORAL DAILY
Qty: 30 TABLET | Refills: 5 | Status: SHIPPED | OUTPATIENT
Start: 2018-03-01 | End: 2018-03-20 | Stop reason: SDUPTHER

## 2018-03-20 DIAGNOSIS — J30.2 SEASONAL ALLERGIC RHINITIS, UNSPECIFIED CHRONICITY, UNSPECIFIED TRIGGER: ICD-10-CM

## 2018-03-20 DIAGNOSIS — K21.9 GASTROESOPHAGEAL REFLUX DISEASE, ESOPHAGITIS PRESENCE NOT SPECIFIED: ICD-10-CM

## 2018-03-20 RX ORDER — PANTOPRAZOLE SODIUM 20 MG/1
20 TABLET, DELAYED RELEASE ORAL DAILY
Qty: 30 TABLET | Refills: 5 | Status: SHIPPED | OUTPATIENT
Start: 2018-03-20 | End: 2018-09-10 | Stop reason: SDUPTHER

## 2018-03-20 RX ORDER — FLUTICASONE PROPIONATE 50 MCG
2 SPRAY, SUSPENSION (ML) NASAL DAILY
Qty: 16 G | Refills: 5 | Status: SHIPPED | OUTPATIENT
Start: 2018-03-20 | End: 2019-09-30 | Stop reason: SDUPTHER

## 2018-03-20 NOTE — TELEPHONE ENCOUNTER
Patient states these prescriptions were sent to the wrong pharmacy recently and would like them sent to [facility].

## 2018-03-20 NOTE — TELEPHONE ENCOUNTER
----- Message from Sarah Latham sent at 3/20/2018  9:07 AM CDT -----  Contact: Pt   Pt requested a callback in regards to refill request she sent via patient portal pt  need to have her medication renewed please today..[phone] (home)

## 2018-09-06 ENCOUNTER — PATIENT MESSAGE (OUTPATIENT)
Dept: INTERNAL MEDICINE | Facility: CLINIC | Age: 68
End: 2018-09-06

## 2018-09-10 ENCOUNTER — LAB VISIT (OUTPATIENT)
Dept: LAB | Facility: HOSPITAL | Age: 68
End: 2018-09-10
Attending: FAMILY MEDICINE
Payer: MEDICARE

## 2018-09-10 ENCOUNTER — OFFICE VISIT (OUTPATIENT)
Dept: INTERNAL MEDICINE | Facility: CLINIC | Age: 68
End: 2018-09-10
Payer: MEDICARE

## 2018-09-10 VITALS
OXYGEN SATURATION: 98 % | SYSTOLIC BLOOD PRESSURE: 118 MMHG | BODY MASS INDEX: 44.3 KG/M2 | DIASTOLIC BLOOD PRESSURE: 70 MMHG | HEIGHT: 63 IN | HEART RATE: 80 BPM | TEMPERATURE: 98 F | WEIGHT: 250 LBS

## 2018-09-10 DIAGNOSIS — E66.01 MORBID OBESITY WITH BMI OF 40.0-44.9, ADULT: Chronic | ICD-10-CM

## 2018-09-10 DIAGNOSIS — K21.9 GASTROESOPHAGEAL REFLUX DISEASE, ESOPHAGITIS PRESENCE NOT SPECIFIED: ICD-10-CM

## 2018-09-10 DIAGNOSIS — Z12.31 SCREENING MAMMOGRAM, ENCOUNTER FOR: ICD-10-CM

## 2018-09-10 DIAGNOSIS — Z00.00 ANNUAL PHYSICAL EXAM: Primary | ICD-10-CM

## 2018-09-10 DIAGNOSIS — Z00.00 ANNUAL PHYSICAL EXAM: ICD-10-CM

## 2018-09-10 DIAGNOSIS — E78.5 DIET-CONTROLLED HYPERLIPIDEMIA: ICD-10-CM

## 2018-09-10 LAB
ALBUMIN SERPL BCP-MCNC: 3.9 G/DL
ALP SERPL-CCNC: 58 U/L
ALT SERPL W/O P-5'-P-CCNC: 20 U/L
ANION GAP SERPL CALC-SCNC: 9 MMOL/L
AST SERPL-CCNC: 20 U/L
BASOPHILS # BLD AUTO: 0.04 K/UL
BASOPHILS NFR BLD: 0.8 %
BILIRUB SERPL-MCNC: 0.4 MG/DL
BUN SERPL-MCNC: 28 MG/DL
CALCIUM SERPL-MCNC: 9.9 MG/DL
CHLORIDE SERPL-SCNC: 109 MMOL/L
CHOLEST SERPL-MCNC: 175 MG/DL
CHOLEST/HDLC SERPL: 6 {RATIO}
CO2 SERPL-SCNC: 24 MMOL/L
CREAT SERPL-MCNC: 1 MG/DL
DIFFERENTIAL METHOD: ABNORMAL
EOSINOPHIL # BLD AUTO: 0.1 K/UL
EOSINOPHIL NFR BLD: 2.2 %
ERYTHROCYTE [DISTWIDTH] IN BLOOD BY AUTOMATED COUNT: 18.3 %
EST. GFR  (AFRICAN AMERICAN): >60 ML/MIN/1.73 M^2
EST. GFR  (NON AFRICAN AMERICAN): 57.6 ML/MIN/1.73 M^2
GLUCOSE SERPL-MCNC: 90 MG/DL
HCT VFR BLD AUTO: 39.4 %
HDLC SERPL-MCNC: 29 MG/DL
HDLC SERPL: 16.6 %
HGB BLD-MCNC: 11.6 G/DL
IMM GRANULOCYTES # BLD AUTO: 0.01 K/UL
IMM GRANULOCYTES NFR BLD AUTO: 0.2 %
LDLC SERPL CALC-MCNC: 117.2 MG/DL
LYMPHOCYTES # BLD AUTO: 1.2 K/UL
LYMPHOCYTES NFR BLD: 23.7 %
MCH RBC QN AUTO: 25.1 PG
MCHC RBC AUTO-ENTMCNC: 29.4 G/DL
MCV RBC AUTO: 85 FL
MONOCYTES # BLD AUTO: 0.3 K/UL
MONOCYTES NFR BLD: 5.7 %
NEUTROPHILS # BLD AUTO: 3.3 K/UL
NEUTROPHILS NFR BLD: 67.4 %
NONHDLC SERPL-MCNC: 146 MG/DL
NRBC BLD-RTO: 0 /100 WBC
PLATELET # BLD AUTO: 111 K/UL
PMV BLD AUTO: 11.9 FL
POTASSIUM SERPL-SCNC: 4.8 MMOL/L
PROT SERPL-MCNC: 7.4 G/DL
RBC # BLD AUTO: 4.62 M/UL
SODIUM SERPL-SCNC: 142 MMOL/L
TRIGL SERPL-MCNC: 144 MG/DL
WBC # BLD AUTO: 4.93 K/UL

## 2018-09-10 PROCEDURE — 99397 PER PM REEVAL EST PAT 65+ YR: CPT | Mod: S$PBB,,, | Performed by: FAMILY MEDICINE

## 2018-09-10 PROCEDURE — 99499 UNLISTED E&M SERVICE: CPT | Mod: S$GLB,,, | Performed by: FAMILY MEDICINE

## 2018-09-10 PROCEDURE — 80061 LIPID PANEL: CPT

## 2018-09-10 PROCEDURE — 99213 OFFICE O/P EST LOW 20 MIN: CPT | Mod: PBBFAC | Performed by: FAMILY MEDICINE

## 2018-09-10 PROCEDURE — 80053 COMPREHEN METABOLIC PANEL: CPT

## 2018-09-10 PROCEDURE — 99999 PR PBB SHADOW E&M-EST. PATIENT-LVL III: CPT | Mod: PBBFAC,,, | Performed by: FAMILY MEDICINE

## 2018-09-10 PROCEDURE — 36415 COLL VENOUS BLD VENIPUNCTURE: CPT

## 2018-09-10 PROCEDURE — 99499 UNLISTED E&M SERVICE: CPT | Mod: ,,, | Performed by: FAMILY MEDICINE

## 2018-09-10 PROCEDURE — 85025 COMPLETE CBC W/AUTO DIFF WBC: CPT

## 2018-09-10 RX ORDER — ASPIRIN 325 MG
325 TABLET ORAL DAILY
COMMUNITY
End: 2022-03-29

## 2018-09-10 RX ORDER — GABAPENTIN 300 MG/1
CAPSULE ORAL
COMMUNITY
Start: 2018-07-06 | End: 2021-06-30

## 2018-09-10 RX ORDER — PANTOPRAZOLE SODIUM 20 MG/1
20 TABLET, DELAYED RELEASE ORAL DAILY
Qty: 90 TABLET | Refills: 3 | Status: SHIPPED | OUTPATIENT
Start: 2018-09-10 | End: 2020-01-02 | Stop reason: SDUPTHER

## 2018-09-10 RX ORDER — FLUTICASONE PROPIONATE 50 MCG
100 SPRAY, SUSPENSION (ML) NASAL
COMMUNITY
Start: 2018-03-20 | End: 2018-09-10

## 2018-09-10 RX ORDER — MELOXICAM 15 MG/1
1 TABLET ORAL
COMMUNITY
Start: 2014-02-17 | End: 2018-09-10

## 2018-09-10 NOTE — PATIENT INSTRUCTIONS

## 2018-09-10 NOTE — PROGRESS NOTES
Dory Xie  09/10/2018  [id number]    Makenna Davey MD  Patient Care Team:  Makenna Davey MD as PCP - General (Family Medicine)  Kely Salazar MD as Consulting Physician (Physical Medicine and Rehabilitation)  Has the patient seen any provider outside of the [facility] network since the last visit? (no). If yes, HIPPA forms completed and records requested.        Visit Type:Annual    Chief Complaint:  Chief Complaint   Patient presents with    Annual Exam    Medication Refill     patient is requesting a new rx for protonix not sure if you could write it for 90day       History of Present Illness:  68 year old here for annual exam.  Patient is without new complaints.    She has lost 8-10 pounds from last year  Working in pool  She is going on cruise this fall.    Gerd sx. controlled on Protonix.      History:  Past Medical History:   Diagnosis Date    Arthritis     Cancer     cervical    Chronic pain     From osteoarthritis    Colitis     GERD (gastroesophageal reflux disease)     Seasonal allergies      Past Surgical History:   Procedure Laterality Date    COLONOSCOPY N/A 9/21/2016    Procedure: COLONOSCOPY;  Surgeon: Hector Summers III, MD;  Location: [facility];  Service: Endoscopy;  Laterality: N/A;    COLONOSCOPY N/A 9/21/2016    Performed by H[facility] Summers III, MD at [facility] ENDO    ESOPHAGOGASTRODUODENOSCOPY (EGD) N/A 9/21/2016    Performed by Hector Summers III, MD at [facility] ENDO    EYE SURGERY      HYSTERECTOMY      KNEE SURGERY Left     KNEE SURGERY Right     TONSILLECTOMY      TUBAL LIGATION       Family History   Problem Relation Age of Onset    Diabetes Mother     Heart disease Mother     Cancer Mother         lung    Cancer Father         Lung    Hyperlipidemia Father     Hypertension Unknown      Social History     Socioeconomic History    Marital status: Single     Spouse name: Not on file    Number of children: Not on file    Years of education: Not on file     Highest education level: Not on file   Social Needs    Financial resource strain: Not on file    Food insecurity - worry: Not on file    Food insecurity - inability: Not on file    Transportation needs - medical: Not on file    Transportation needs - non-medical: Not on file   Occupational History    Not on file   Tobacco Use    Smoking status: Never Smoker    Smokeless tobacco: Never Used   Substance and Sexual Activity    Alcohol use: No    Drug use: No    Sexual activity: Not on file   Other Topics Concern    Are you pregnant or think you may be? Not Asked    Breast-feeding Not Asked   Social History Narrative    Not on file     Patient Active Problem List   Diagnosis    Primary osteoarthritis of both knees    Chronic pain    Diet-controlled hyperlipidemia    Seasonal allergies    Osteoporosis    BMI of 40.0-44.9, adult    Family history of arteriosclerotic cardiovascular disease    Colitis    Vertigo     Review of patient's allergies indicates:  No Known Allergies    The following were reviewed at this visit: active problem list, medication list, allergies, family history, social history, and health maintenance.    Medications:  Current Outpatient Medications on File Prior to Visit   Medication Sig Dispense Refill    aspirin 325 MG tablet Take 325 mg by mouth once daily.      fluticasone (FLONASE) 50 mcg/actuation nasal spray 2 sprays (100 mcg total) by Each Nare route once daily. 16 g 5    gabapentin (NEURONTIN) 300 MG capsule       meloxicam (MOBIC) 15 MG tablet Take 1 tablet by mouth once daily.      potassium gluconate 595 (99) mg Tab Take 1 tablet by mouth Daily.      [DISCONTINUED] fluticasone (FLONASE) 50 mcg/actuation nasal spray 100 mcg by Nasal route.      [DISCONTINUED] meloxicam (MOBIC) 15 MG tablet Take 1 tablet by mouth.      [DISCONTINUED] mometasone (NASONEX) 50 mcg/actuation nasal spray USE 1 SPRAY IN EACH NOSTRIL EVERY DAY 17 g 4    [DISCONTINUED] pantoprazole  (PROTONIX) 20 MG tablet Take 1 tablet (20 mg total) by mouth once daily. 30 tablet 5    [DISCONTINUED] meclizine (ANTIVERT) 12.5 mg tablet Take 1 tablet (12.5 mg total) by mouth 3 (three) times daily as needed. 30 tablet 1     No current facility-administered medications on file prior to visit.        Medications have been reviewed and reconciled with patient at this visit.  Barriers to medications present (no)    Adverse reactions to current medications (no)    Over the counter medications reviewed (Yes ), and if needed added to active Medication list at this visit.     Exam:  Wt Readings from Last 3 Encounters:   09/10/18 113.4 kg (250 lb 0 oz)   02/28/18 117.2 kg (258 lb 6.1 oz)   11/29/17 118 kg (260 lb 2.3 oz)     Temp Readings from Last 3 Encounters:   09/10/18 97.5 °F (36.4 °C) (Tympanic)   02/28/18 97.6 °F (36.4 °C) (Tympanic)   09/14/17 97.5 °F (36.4 °C) (Tympanic)     BP Readings from Last 3 Encounters:   09/10/18 118/70   02/28/18 138/60   11/29/17 (!) 142/72     Pulse Readings from Last 3 Encounters:   09/10/18 80   02/28/18 101   11/29/17 88     Body mass index is 44.29 kg/m².    Review of Systems   Constitutional: Negative.  Negative for chills and fever.   HENT: Negative.  Negative for congestion, sinus pain and sore throat.    Eyes: Negative for blurred vision and double vision.   Respiratory: Negative for cough, sputum production, shortness of breath and wheezing.    Cardiovascular: Negative for chest pain, palpitations and leg swelling.   Gastrointestinal: Negative for abdominal pain, constipation, diarrhea, heartburn, nausea and vomiting.   Genitourinary: Negative.    Musculoskeletal: Negative.    Skin: Negative.  Negative for rash.   Neurological: Negative.    Endo/Heme/Allergies: Negative.  Negative for polydipsia. Does not bruise/bleed easily.   Psychiatric/Behavioral: Negative for depression and substance abuse.         Physical Exam   Constitutional: She is oriented to person, place, and  time. She appears well-developed and well-nourished. No distress.   HENT:   Head: Normocephalic and atraumatic.   Right Ear: External ear normal.   Left Ear: External ear normal.   Nose: Nose normal.   Mouth/Throat: Oropharynx is clear and moist. No oropharyngeal exudate.   Eyes: Conjunctivae and EOM are normal. Pupils are equal, round, and reactive to light. Right eye exhibits no discharge. Left eye exhibits no discharge.   Neck: Normal range of motion. Neck supple. No thyromegaly present.   Cardiovascular: Normal rate, regular rhythm, normal heart sounds and intact distal pulses.   No murmur heard.  Pulmonary/Chest: Effort normal and breath sounds normal. No respiratory distress. She has no wheezes.   Abdominal: Soft. Bowel sounds are normal. She exhibits no distension and no mass. There is no tenderness.   Musculoskeletal: Normal range of motion. She exhibits no edema.   Lymphadenopathy:     She has no cervical adenopathy.   Neurological: She is alert and oriented to person, place, and time. No cranial nerve deficit.   Skin: Capillary refill takes less than 2 seconds. She is not diaphoretic.   Psychiatric: She has a normal mood and affect. Her behavior is normal. Judgment and thought content normal.   Nursing note and vitals reviewed.      Laboratory Reviewed ({Yes)  Lab Results   Component Value Date    WBC 5.54 09/15/2017    HGB 11.5 (L) 09/15/2017    HCT 37.6 09/15/2017     09/15/2017    CHOL 179 09/15/2017    TRIG 146 09/15/2017    HDL 34 (L) 09/15/2017    ALT 12 09/15/2017    AST 11 09/15/2017     09/15/2017    K 4.3 09/15/2017     09/15/2017    CREATININE 0.9 09/15/2017    BUN 24 (H) 09/15/2017    CO2 24 09/15/2017    TSH 1.758 09/15/2017    INR 1.1 09/10/2016    HGBA1C 5.4 02/19/2014       Assessment:  The primary encounter diagnosis was Annual physical exam. Diagnoses of Screening mammogram, encounter for, Diet-controlled hyperlipidemia, BMI of 40.0-44.9, adult, and Gastroesophageal  reflux disease, esophagitis presence not specified were also pertinent to this visit.     Plan     Annual physical exam  -     CBC auto differential; Future; Expected date: 09/10/2018  -     Comprehensive metabolic panel; Future; Expected date: 09/10/2018  -     Lipid panel; Future; Expected date: 09/10/2018    Screening mammogram, encounter for  -     Mammo Digital Screening Bilat with CAD; Future; Expected date: 09/10/2018    Diet-controlled hyperlipidemia   Recheck Lipids   Continue weight loss    BMI of 40.0-44.9, adult   Continues weight loss      Care Plan/Goals: Reviewed  (Yes)  Goals      Blood Pressure < 140/90      LDL CHOLESTEROL < 130      Weight < 190 lb (86.183 kg)           Follow up: Follow-up in about 1 year (around 9/10/2019).    After visit summary was printed and given to patient upon discharge today.  Patient goals and care plan are included in After Visit Summary.

## 2018-09-11 DIAGNOSIS — D69.6 THROMBOCYTOPENIA: Primary | ICD-10-CM

## 2018-09-12 ENCOUNTER — TELEPHONE (OUTPATIENT)
Dept: INTERNAL MEDICINE | Facility: CLINIC | Age: 68
End: 2018-09-12

## 2018-09-12 NOTE — TELEPHONE ENCOUNTER
----- Message from Sondra Olmstead sent at 9/12/2018  1:38 PM CDT -----  Contact: pt  Pt returning nurse call, please call pt @ [phone].

## 2018-09-13 ENCOUNTER — TELEPHONE (OUTPATIENT)
Dept: INTERNAL MEDICINE | Facility: CLINIC | Age: 68
End: 2018-09-13

## 2018-09-13 NOTE — TELEPHONE ENCOUNTER
----- Message from Urmila Hernandez sent at 9/13/2018  9:39 AM CDT -----  Contact: [phone]   Pt would like to consult with the nurse about lab Results.  Please call back at [phone].   Thx--AH

## 2018-09-14 DIAGNOSIS — D69.6 THROMBOCYTOPENIA: Primary | ICD-10-CM

## 2018-10-02 ENCOUNTER — HOSPITAL ENCOUNTER (OUTPATIENT)
Dept: RADIOLOGY | Facility: HOSPITAL | Age: 68
Discharge: HOME | End: 2018-10-02
Attending: FAMILY MEDICINE
Payer: MEDICARE

## 2018-10-02 VITALS — BODY MASS INDEX: 44.3 KG/M2 | HEIGHT: 63 IN | WEIGHT: 250 LBS

## 2018-10-02 DIAGNOSIS — Z12.31 SCREENING MAMMOGRAM, ENCOUNTER FOR: ICD-10-CM

## 2018-10-02 PROCEDURE — 77067 SCR MAMMO BI INCL CAD: CPT | Mod: TC

## 2018-10-02 PROCEDURE — 77067 SCR MAMMO BI INCL CAD: CPT | Mod: 26,,, | Performed by: RADIOLOGY

## 2018-11-12 ENCOUNTER — PATIENT MESSAGE (OUTPATIENT)
Dept: INTERNAL MEDICINE | Facility: CLINIC | Age: 68
End: 2018-11-12

## 2018-11-12 DIAGNOSIS — Z51.81 MEDICATION MONITORING ENCOUNTER: Primary | ICD-10-CM

## 2018-11-19 ENCOUNTER — LAB VISIT (OUTPATIENT)
Dept: LAB | Facility: HOSPITAL | Age: 68
End: 2018-11-19
Attending: FAMILY MEDICINE
Payer: MEDICARE

## 2018-11-19 DIAGNOSIS — Z51.81 MEDICATION MONITORING ENCOUNTER: ICD-10-CM

## 2018-11-19 LAB
ANION GAP SERPL CALC-SCNC: 8 MMOL/L
BUN SERPL-MCNC: 26 MG/DL
CALCIUM SERPL-MCNC: 9.4 MG/DL
CHLORIDE SERPL-SCNC: 110 MMOL/L
CO2 SERPL-SCNC: 26 MMOL/L
CREAT SERPL-MCNC: 0.9 MG/DL
EST. GFR  (AFRICAN AMERICAN): >60 ML/MIN/1.73 M^2
EST. GFR  (NON AFRICAN AMERICAN): >60 ML/MIN/1.73 M^2
GLUCOSE SERPL-MCNC: 104 MG/DL
POTASSIUM SERPL-SCNC: 4.2 MMOL/L
SODIUM SERPL-SCNC: 144 MMOL/L

## 2018-11-19 PROCEDURE — 80048 BASIC METABOLIC PNL TOTAL CA: CPT

## 2018-11-19 PROCEDURE — 36415 COLL VENOUS BLD VENIPUNCTURE: CPT

## 2018-11-20 NOTE — PROGRESS NOTES
Notify patient.  Her BMP is the same. Her BUN remains at 26, which is slighlty elevated. Her Creatinine is normal. When we calculate her filtration, she is now >60, which is in the normal range.   She needs to continue to hydrate with water and I would avoid NSAID meds like Motrin or Iburpofen.     The CBC was not done to recheck her platelets, only the BMP was done. This was probably so since she missed the lab appt, and it wasn't linked. She still needs to do the CBC for the recheck on low platelets. Sorry for the error.

## 2018-11-28 ENCOUNTER — TELEPHONE (OUTPATIENT)
Dept: INTERNAL MEDICINE | Facility: CLINIC | Age: 68
End: 2018-11-28

## 2018-11-28 NOTE — TELEPHONE ENCOUNTER
----- Message from Susan Stephen sent at 11/28/2018  8:09 AM CST -----  Contact: self [phone]  States that she is returning call. Please call back at [phone]//thank you acc

## 2018-11-28 NOTE — TELEPHONE ENCOUNTER
----- Message from Alyssa Joshi sent at 11/28/2018  7:53 AM CST -----  needs c/b rg bloodwork that needs to be redone..[phone] (home)

## 2018-11-30 ENCOUNTER — LAB VISIT (OUTPATIENT)
Dept: LAB | Facility: HOSPITAL | Age: 68
End: 2018-11-30
Attending: FAMILY MEDICINE
Payer: MEDICARE

## 2018-11-30 DIAGNOSIS — D69.6 THROMBOCYTOPENIA: ICD-10-CM

## 2018-11-30 LAB
BASOPHILS # BLD AUTO: 0.04 K/UL
BASOPHILS NFR BLD: 0.7 %
DIFFERENTIAL METHOD: ABNORMAL
EOSINOPHIL # BLD AUTO: 0.1 K/UL
EOSINOPHIL NFR BLD: 2.3 %
ERYTHROCYTE [DISTWIDTH] IN BLOOD BY AUTOMATED COUNT: 16.9 %
HCT VFR BLD AUTO: 38.3 %
HGB BLD-MCNC: 11.5 G/DL
IMM GRANULOCYTES # BLD AUTO: 0.02 K/UL
IMM GRANULOCYTES NFR BLD AUTO: 0.4 %
LYMPHOCYTES # BLD AUTO: 1.4 K/UL
LYMPHOCYTES NFR BLD: 24.4 %
MCH RBC QN AUTO: 24.4 PG
MCHC RBC AUTO-ENTMCNC: 30 G/DL
MCV RBC AUTO: 81 FL
MONOCYTES # BLD AUTO: 0.3 K/UL
MONOCYTES NFR BLD: 5.7 %
NEUTROPHILS # BLD AUTO: 3.7 K/UL
NEUTROPHILS NFR BLD: 66.5 %
NRBC BLD-RTO: 0 /100 WBC
PLATELET # BLD AUTO: 146 K/UL
PMV BLD AUTO: 12.6 FL
RBC # BLD AUTO: 4.72 M/UL
WBC # BLD AUTO: 5.62 K/UL

## 2018-11-30 PROCEDURE — 36415 COLL VENOUS BLD VENIPUNCTURE: CPT

## 2018-11-30 PROCEDURE — 85025 COMPLETE CBC W/AUTO DIFF WBC: CPT

## 2018-12-04 ENCOUNTER — TELEPHONE (OUTPATIENT)
Dept: INTERNAL MEDICINE | Facility: CLINIC | Age: 68
End: 2018-12-04

## 2018-12-04 RX ORDER — MELOXICAM 15 MG/1
15 TABLET ORAL DAILY
Qty: 30 TABLET | Refills: 1 | Status: SHIPPED | OUTPATIENT
Start: 2018-12-04 | End: 2019-08-22 | Stop reason: SDUPTHER

## 2019-02-08 ENCOUNTER — PATIENT MESSAGE (OUTPATIENT)
Dept: INTERNAL MEDICINE | Facility: CLINIC | Age: 69
End: 2019-02-08

## 2019-02-08 ENCOUNTER — TELEPHONE (OUTPATIENT)
Dept: INTERNAL MEDICINE | Facility: CLINIC | Age: 69
End: 2019-02-08

## 2019-02-08 ENCOUNTER — OFFICE VISIT (OUTPATIENT)
Dept: INTERNAL MEDICINE | Facility: CLINIC | Age: 69
End: 2019-02-08
Payer: MEDICARE

## 2019-02-08 ENCOUNTER — LAB VISIT (OUTPATIENT)
Dept: LAB | Facility: HOSPITAL | Age: 69
End: 2019-02-08
Attending: PHYSICIAN ASSISTANT
Payer: MEDICARE

## 2019-02-08 VITALS
HEIGHT: 63 IN | OXYGEN SATURATION: 98 % | DIASTOLIC BLOOD PRESSURE: 64 MMHG | SYSTOLIC BLOOD PRESSURE: 136 MMHG | HEART RATE: 91 BPM | TEMPERATURE: 98 F | WEIGHT: 237.19 LBS | BODY MASS INDEX: 42.03 KG/M2

## 2019-02-08 DIAGNOSIS — R10.819 SUPRAPUBIC TENDERNESS: Primary | ICD-10-CM

## 2019-02-08 DIAGNOSIS — R10.819 SUPRAPUBIC TENDERNESS: ICD-10-CM

## 2019-02-08 LAB
BILIRUB UR QL STRIP: NEGATIVE
CLARITY UR: CLEAR
COLOR UR: YELLOW
GLUCOSE UR QL STRIP: NEGATIVE
HGB UR QL STRIP: ABNORMAL
KETONES UR QL STRIP: NEGATIVE
LEUKOCYTE ESTERASE UR QL STRIP: NEGATIVE
NITRITE UR QL STRIP: NEGATIVE
PH UR STRIP: 6 [PH] (ref 5–8)
PROT UR QL STRIP: NEGATIVE
SP GR UR STRIP: 1.01 (ref 1–1.03)
URN SPEC COLLECT METH UR: ABNORMAL

## 2019-02-08 PROCEDURE — 99999 PR PBB SHADOW E&M-EST. PATIENT-LVL IV: ICD-10-PCS | Mod: PBBFAC,,, | Performed by: PHYSICIAN ASSISTANT

## 2019-02-08 PROCEDURE — 99999 PR PBB SHADOW E&M-EST. PATIENT-LVL IV: CPT | Mod: PBBFAC,,, | Performed by: PHYSICIAN ASSISTANT

## 2019-02-08 PROCEDURE — 99214 OFFICE O/P EST MOD 30 MIN: CPT | Mod: S$GLB,,, | Performed by: PHYSICIAN ASSISTANT

## 2019-02-08 PROCEDURE — 1101F PT FALLS ASSESS-DOCD LE1/YR: CPT | Mod: CPTII,S$GLB,, | Performed by: PHYSICIAN ASSISTANT

## 2019-02-08 PROCEDURE — 87088 URINE BACTERIA CULTURE: CPT

## 2019-02-08 PROCEDURE — 81003 URINALYSIS AUTO W/O SCOPE: CPT

## 2019-02-08 PROCEDURE — 99214 PR OFFICE/OUTPT VISIT, EST, LEVL IV, 30-39 MIN: ICD-10-PCS | Mod: S$GLB,,, | Performed by: PHYSICIAN ASSISTANT

## 2019-02-08 PROCEDURE — 87186 SC STD MICRODIL/AGAR DIL: CPT

## 2019-02-08 PROCEDURE — 87077 CULTURE AEROBIC IDENTIFY: CPT

## 2019-02-08 PROCEDURE — 87086 URINE CULTURE/COLONY COUNT: CPT

## 2019-02-08 PROCEDURE — 1101F PR PT FALLS ASSESS DOC 0-1 FALLS W/OUT INJ PAST YR: ICD-10-PCS | Mod: CPTII,S$GLB,, | Performed by: PHYSICIAN ASSISTANT

## 2019-02-08 NOTE — TELEPHONE ENCOUNTER
Left voicemail attempting to reach patient regarding today's following appt:    Future Appointments   Date Time Provider Department Center   2/8/2019  4:40 PM Phoebe Padron PA-C [facility]   9[facility]11/2019  1:00 PM [facility]

## 2019-02-08 NOTE — PROGRESS NOTES
Subjective:      Patient ID: Dory Xie is a 69 y.o. female.    Chief Complaint: Urinary Tract Infection (follow up)    Urinary Tract Infection    This is a new problem. The current episode started in the past 7 days. The problem has been gradually worsening. The quality of the pain is described as aching. There has been no fever. Associated symptoms include flank pain. Pertinent negatives include no behavior changes, chills, discharge, frequency, hematuria, hesitancy, nausea, possible pregnancy, sweats, urgency, vomiting, weight loss, bubble bath use, constipation, rash or withholding. Associated symptoms comments: Suprapubic tenderness. Treatments tried: macrobid. The treatment provided no relief. There is no history of catheterization, diabetes insipidus, diabetes mellitus, genitourinary reflux, hypertension, kidney stones, recurrent UTIs, a single kidney, STD, urinary stasis or a urological procedure.       Review of Systems   Constitutional: Negative for activity change, appetite change, chills, diaphoresis, fatigue, fever, unexpected weight change and weight loss.   HENT: Negative.    Eyes: Negative.    Respiratory: Negative for cough, chest tightness and shortness of breath.    Cardiovascular: Negative for chest pain, palpitations and leg swelling.   Gastrointestinal: Positive for abdominal pain (suprapubic). Negative for abdominal distention, anal bleeding, blood in stool, constipation, diarrhea, nausea, rectal pain and vomiting.   Endocrine: Negative for cold intolerance, heat intolerance, polydipsia, polyphagia and polyuria.   Genitourinary: Positive for dysuria and flank pain. Negative for decreased urine volume, difficulty urinating, enuresis, frequency, genital sores, hematuria, hesitancy and urgency.   Musculoskeletal: Negative for back pain.   Skin: Negative for color change, pallor, rash and wound.   Neurological: Negative for dizziness, weakness and headaches.     Objective:   /64 (BP  "Location: Left arm, Patient Position: Sitting, BP Method: Medium (Manual))   Pulse 91   Temp 97.5 °F (36.4 °C) (Tympanic)   Ht 5' 3" (1.6 m)   Wt 107.6 kg (237 lb 3.4 oz)   SpO2 98%   BMI 42.02 kg/m²     Physical Exam   Constitutional: She is oriented to person, place, and time. She appears well-developed and well-nourished.   HENT:   Head: Normocephalic and atraumatic.   Right Ear: External ear normal.   Left Ear: External ear normal.   Nose: Nose normal.   Mouth/Throat: Oropharynx is clear and moist.   Eyes: Conjunctivae and EOM are normal. Pupils are equal, round, and reactive to light.   Neck: Normal range of motion.   Cardiovascular: Normal rate, regular rhythm and normal heart sounds. Exam reveals no gallop and no friction rub.   No murmur heard.  Pulmonary/Chest: Effort normal and breath sounds normal. No respiratory distress. She has no wheezes. She has no rales. She exhibits no tenderness.   Abdominal: Soft. Bowel sounds are normal. She exhibits no distension, no pulsatile liver, no fluid wave, no ascites, no pulsatile midline mass and no mass. There is no hepatosplenomegaly, splenomegaly or hepatomegaly. There is tenderness in the suprapubic area. There is no rigidity, no rebound, no guarding and no CVA tenderness.   Musculoskeletal: Normal range of motion.   Neurological: She is alert and oriented to person, place, and time.   Skin: Skin is warm. No rash noted.   Psychiatric: She has a normal mood and affect. Her behavior is normal. Judgment and thought content normal.   Vitals reviewed.    Lab Visit on 02/08/2019   Component Date Value Ref Range Status    Specimen UA 02/08/2019 Urine, Clean Catch   Final    Color, UA 02/08/2019 Yellow  Yellow, Straw, Juanita Final    Appearance, UA 02/08/2019 Clear  Clear Final    pH, UA 02/08/2019 6.0  5.0 - 8.0 Final    Specific Gravity, UA 02/08/2019 1.010  1.005 - 1.030 Final    Protein, UA 02/08/2019 Negative  Negative Final    Comment: Recommend a 24 hour " urine protein or a urine   protein/creatinine ratio if globulin induced proteinuria is  clinically suspected.      Glucose, UA 02/08/2019 Negative  Negative Final    Ketones, UA 02/08/2019 Negative  Negative Final    Bilirubin (UA) 02/08/2019 Negative  Negative Final    Occult Blood UA 02/08/2019 Trace* Negative Final    Nitrite, UA 02/08/2019 Negative  Negative Final    Leukocytes, UA 02/08/2019 Negative  Negative Final    Urine Culture, Routine 02/08/2019    Final                    Value:PROTEUS VULGARIS  10,000 - 49,999 cfu/ml         Assessment:     1. Suprapubic tenderness      Plan:   Suprapubic tenderness  -     Urinalysis; Future; Expected date: 02/08/2019  -     Urine culture; Future    -increase fluids  -CULTURE GREW BACK BACTERIA SUSCEPTIBLE TO AUGMENTIN. WILL SEND AUGMENTIN 500 BID FOR 7 DAYS TO PHARMACY.    Follow-up if symptoms worsen or fail to improve.

## 2019-02-08 NOTE — PATIENT INSTRUCTIONS
Unknown Causes of Abdominal Pain (Female)    The exact cause of your abdominal (stomach) pain is not clear. This does not mean that this is something to worry about. Everyone likes to know the exact cause of the problem, but sometimes with abdominal pain, there is no clear-cut cause, and this could be a good thing. The good news is that your symptoms can be treated, and you will feel better.   Your condition does not seem serious now; however, sometimes the signs of a serious problem may take more time to appear. For this reason, it is important for you to watch for any new symptoms, problems, or worsening of your condition.  Over the next few days, the abdominal pain may come and go, or be continuous. Other common symptoms can include nausea and vomiting. Sometimes it can be difficult to tell if you feel nauseous, you may just feel bad and not associate that feeling with nausea. Constipation, diarrhea, and a fever may go along with the pain.  The pain may continue even if treated correctly over the following days. Depending on how things go, sometimes the cause can become clear and may require further or different treatment. Additional evaluations, medications, or tests may also be needed.  Home care  Your healthcare provider may prescribe medicine for pain, symptoms, or an infection.  Follow the healthcare provider's instructions for taking these medicines.  General care  · Rest as much as you can until your next exam. No strenuous activities.  · Try to find positions that ease discomfort. A small pillow placed on the abdomen may help relieve pain.  · Something warm on your abdomen (such as a heating pad) may help, but be careful not to burn yourself.  Diet  · Do not force yourself to eat, especially if having cramps, vomiting, or diarrhea.  · Water is important so you do not get dehydrated. Soup may also be good. Sports drinks may also help, especially if they are not too acidic. Make sure you don't drink  sugary drinks as this can make things worse. Take liquids in small amounts. Do not guzzle them.  · Caffeine sometimes makes the pain and cramping worse.  · Avoid dairy products if you have vomiting or diarrhea.  · Don't eat large amounts at a time. Wait a few minutes between bites.  · Eat a diet low in fiber (called a low-residue diet). Foods allowed include refined breads, white rice, fruit and vegetable juices without pulp, tender meats. These foods will pass more easily through the intestine.  · Avoid whole-grain foods, whole fruits and vegetables, meats, seeds and nuts, fried or fatty foods, dairy, alcohol and spicy foods until your symptoms go away.  Follow-up care  Follow up with your healthcare provider, or as advised, if your pain does not begin to improve in the next 24 hours.  Call 911  Call 911 if any of these occur:  · Trouble breathing  · Confusion  · Fainting or loss of consciousness  · Rapid heart rate  · Seizure  When to seek medical advice  Call your healthcare provider right away if any of these occur:  · Pain gets worse or moves to the right lower abdomen  · New or worsening vomiting or diarrhea  · Swelling of the abdomen  · Unable to pass stool for more than 3 days  · Fever of 100.4ºF (38ºC) or higher, or as directed by your healthcare provider.  · Blood in vomit or bowel movements (dark red or black color)  · Jaundice (yellow color of eyes and skin)  · Weakness, dizziness  · Chest, arm, back, neck or jaw pain  · Unexpected vaginal bleeding or missed period  · Can't keep down liquids or water and are getting dehydrated  Date Last Reviewed: 12/30/2015  © 3650-3452 [employer]. [address] [place] [address], [place] [address]. All rights reserved. This information is not intended as a substitute for professional medical care. Always follow your healthcare professional's instructions.

## 2019-02-12 DIAGNOSIS — N30.00 ACUTE CYSTITIS WITHOUT HEMATURIA: Primary | ICD-10-CM

## 2019-02-12 LAB — BACTERIA UR CULT: NORMAL

## 2019-02-12 RX ORDER — AMOXICILLIN AND CLAVULANATE POTASSIUM 500; 125 MG/1; MG/1
1 TABLET, FILM COATED ORAL 2 TIMES DAILY
Qty: 14 TABLET | Refills: 0 | Status: SHIPPED | OUTPATIENT
Start: 2019-02-12 | End: 2019-02-19

## 2019-06-06 ENCOUNTER — TELEPHONE (OUTPATIENT)
Dept: INTERNAL MEDICINE | Facility: CLINIC | Age: 69
End: 2019-06-06

## 2019-06-06 NOTE — TELEPHONE ENCOUNTER
----- Message from Yessy Johnson sent at 6/6/2019 11:50 AM CDT -----  Contact: Pt  Pt would like nurse to contact her regarding a current medication. Pt wants all her medication going to [facility] and would like a 90 day request on medication meloxicam (MOBIC) 15 MG tablet). Pt asked that nurse please contact her at ([phone]).

## 2019-06-18 ENCOUNTER — TELEPHONE (OUTPATIENT)
Dept: INTERNAL MEDICINE | Facility: CLINIC | Age: 69
End: 2019-06-18

## 2019-06-18 NOTE — TELEPHONE ENCOUNTER
----- Message from Makenna Davey MD sent at 6/18/2019  9:09 AM CDT -----  Contact: Pt   Her Mammogram was done on 10.18 and it was negative.  She is due in October of this year.   Why is she requesting early?    ----- Message -----  From: Vijay Higuera MA  Sent: 6/18/2019   8:29 AM  To: Makenna Davey MD    Patient is requesting orders for mammo  ----- Message -----  From: Leonel Barillas  Sent: 6/18/2019   8:18 AM  To: [facility] Staff    Pt is calling .Type:  Mammogram  Pt  is requesting to have orders put in to schedule mammogram appointment.  Order is not listed in EPIC.  Please enter order and contact patient to schedule.  Name of Caller Pt states that she is having pain in breast   Where would they like the mammogram performed? [facility]   Would the patient rather a call back or a response via [facility]? Call Back   Best Call Back Number:[phone]         ..Thank You  Vivian Barillas

## 2019-06-18 NOTE — TELEPHONE ENCOUNTER
She is schedueld to see Aashish tomorrow at 11:20 can I put her on at 11 tomorrow? Or keep appointment with aashish?

## 2019-06-18 NOTE — TELEPHONE ENCOUNTER
She states that she has been having an issues with an achy feeling in her left breast. She said back in October it was there but it would come and go. She said this mooring it woke her up out of her sleep. She said she has been doing water exercise and she could feel it then as well. She said since her previous mammo came back fine she wasn't worried about it but now its bothering her.

## 2019-06-18 NOTE — PROGRESS NOTES
Dory Xie  06/19/2019  [id number]    Makenna Davey MD  Patient Care Team:  Makenna Davey MD as PCP - General (Family Medicine)  Kely Saalzar MD as Consulting Physician (Physical Medicine and Rehabilitation)  Iliana Benoit LPN as Care Coordinator (Internal Medicine)  Has the patient seen any provider outside of the [facility] network since the last visit? (no). If yes, HIPPA forms completed and records requested.        Visit Type:an urgent visit for a new problem    Chief Complaint:  No chief complaint on file.      History of Present Illness:  Left breast pain. Months, but seems to be getting worse.     Last MMG in Sept, negative/benign          History:  Past Medical History:   Diagnosis Date    Arthritis     Cancer     cervical    Chronic pain     From osteoarthritis    Colitis     GERD (gastroesophageal reflux disease)     Seasonal allergies      Past Surgical History:   Procedure Laterality Date    COLONOSCOPY N/A 9/21/2016    [facility]    ESOPHAGOGASTRODUODENOSCOPY (EGD) N/A 9/21/2016    Performed [facility] at [facility]    EYE SURGERY      HYSTERECTOMY      29yrs old    KNEE SURGERY Left     KNEE SURGERY Right     TONSILLECTOMY      TUBAL LIGATION       Family History   Problem Relation Age of Onset    Diabetes Mother     Heart disease Mother     Cancer Mother         lung    Cancer Father         Lung    Hyperlipidemia Father     Hypertension Unknown      Social History     Socioeconomic History    Marital status: Single     Spouse name: Not on file    Number of children: Not on file    Years of education: Not on file    Highest education level: Not on file   Occupational History    Not on file   Social Needs    Financial resource strain: Not on file    Food insecurity:     Worry: Not on file     Inability: Not on file    Transportation needs:     Medical: Not on file     Non-medical: Not on file   Tobacco  Use    Smoking status: Never Smoker    Smokeless tobacco: Never Used   Substance and Sexual Activity    Alcohol use: No    Drug use: No    Sexual activity: Not on file   Lifestyle    Physical activity:     Days per week: Not on file     Minutes per session: Not on file    Stress: Not on file   Relationships    Social connections:     Talks on phone: Not on file     Gets together: Not on file     Attends Congregation service: Not on file     Active member of club or organization: Not on file     Attends meetings of clubs or organizations: Not on file     Relationship status: Not on file   Other Topics Concern    Are you pregnant or think you may be? Not Asked    Breast-feeding Not Asked   Social History Narrative    Not on file     Patient Active Problem List   Diagnosis    Primary osteoarthritis of both knees    Chronic pain    Diet-controlled hyperlipidemia    Seasonal allergies    Osteoporosis    BMI of 40.0-44.9, adult    Family history of arteriosclerotic cardiovascular disease    Colitis    Vertigo     Review of patient's allergies indicates:  No Known Allergies    The following were reviewed at this visit: active problem list, medication list, allergies, family history, social history, and health maintenance.    Medications:  Current Outpatient Medications on File Prior to Visit   Medication Sig Dispense Refill    aspirin 325 MG tablet Take 325 mg by mouth once daily.      fluticasone (FLONASE) 50 mcg/actuation nasal spray 2 sprays (100 mcg total) by Each Nare route once daily. 16 g 5    meloxicam (MOBIC) 15 MG tablet Take 1 tablet (15 mg total) by mouth once daily. 30 tablet 1    pantoprazole (PROTONIX) 20 MG tablet Take 1 tablet (20 mg total) by mouth once daily. 90 tablet 3    gabapentin (NEURONTIN) 300 MG capsule       influenza (FLUZONE HIGH-DOSE) 180 mcg/0.5 mL vaccine Admin by [facility] 0.5 mL 0    potassium gluconate 595 (99) mg Tab Take 1 tablet by mouth Daily.       No current  facility-administered medications on file prior to visit.        Medications have been reviewed and reconciled with patient at this visit.  Barriers to medications present (no)    Adverse reactions to current medications (no)    Over the counter medications reviewed (No ), and if needed added to active Medication list at this visit.     Exam:  Wt Readings from Last 3 Encounters:   06/19/19 111.9 kg (246 lb 11.1 oz)   02/08/19 107.6 kg (237 lb 3.4 oz)   10/02/18 113.4 kg (250 lb)     Temp Readings from Last 3 Encounters:   06/19/19 96.6 °F (35.9 °C)   02/08/19 97.5 °F (36.4 °C) (Tympanic)   09/10/18 97.5 °F (36.4 °C) (Tympanic)     BP Readings from Last 3 Encounters:   06/19/19 104/68   02/08/19 136/64   09/10/18 118/70     Pulse Readings from Last 3 Encounters:   06/19/19 92   02/08/19 91   09/10/18 80     Body mass index is 43.01 kg/m².      Review of Systems   Constitutional: Negative.  Negative for chills and fever.   HENT: Negative.  Negative for congestion, sinus pain and sore throat.    Eyes: Negative for blurred vision and double vision.   Respiratory: Negative for cough, sputum production, shortness of breath and wheezing.    Cardiovascular: Negative for chest pain, palpitations and leg swelling.        Breast Pain     Gastrointestinal: Negative for abdominal pain, constipation, diarrhea, heartburn, nausea and vomiting.   Genitourinary: Negative.    Musculoskeletal: Negative.    Skin: Negative.  Negative for rash.   Neurological: Negative.    Endo/Heme/Allergies: Negative.  Negative for polydipsia. Does not bruise/bleed easily.   Psychiatric/Behavioral: Negative for depression and substance abuse.     Physical Exam   Constitutional: She is oriented to person, place, and time. She appears well-developed and well-nourished. No distress.   HENT:   Head: Normocephalic and atraumatic.   Right Ear: External ear normal.   Left Ear: External ear normal.   Nose: Nose normal.   Mouth/Throat: Oropharynx is clear and  moist. No oropharyngeal exudate.   Eyes: Pupils are equal, round, and reactive to light. Conjunctivae and EOM are normal. Right eye exhibits no discharge. Left eye exhibits no discharge.   Neck: Normal range of motion. Neck supple. No thyromegaly present.   Cardiovascular: Normal rate, regular rhythm, normal heart sounds and intact distal pulses.   No murmur heard.  Pulmonary/Chest: Effort normal and breath sounds normal. No respiratory distress. She has no wheezes. Right breast exhibits no inverted nipple, no nipple discharge and no tenderness. Left breast exhibits tenderness. Left breast exhibits no inverted nipple, no mass, no nipple discharge and no skin change.       Abdominal: Soft. Bowel sounds are normal. She exhibits no distension and no mass. There is no tenderness.   Musculoskeletal: Normal range of motion. She exhibits no edema.   Lymphadenopathy:     She has no cervical adenopathy.   Neurological: She is alert and oriented to person, place, and time. No cranial nerve deficit.   Skin: Capillary refill takes less than 2 seconds. She is not diaphoretic.   Psychiatric: She has a normal mood and affect. Her behavior is normal. Judgment and thought content normal.   Nursing note and vitals reviewed.      Laboratory Reviewed ({N/A)  Lab Results   Component Value Date    WBC 5.62 11/30/2018    HGB 11.5 (L) 11/30/2018    HCT 38.3 11/30/2018     (L) 11/30/2018    CHOL 175 09/10/2018    TRIG 144 09/10/2018    HDL 29 (L) 09/10/2018    ALT 20 09/10/2018    AST 20 09/10/2018     11/19/2018    K 4.2 11/19/2018     11/19/2018    CREATININE 0.9 11/19/2018    BUN 26 (H) 11/19/2018    CO2 26 11/19/2018    TSH 1.758 09/15/2017    INR 1.1 09/10/2016    HGBA1C 5.4 02/19/2014       Diagnoses and all orders for this visit:    Breast pain, left  -     US Breast Left Complete; Future  -     Mammo Digital Diagnostic Left; Future                Care Plan/Goals: Reviewed  (N/A)  Goals      Blood Pressure <  140/90      LDL CHOLESTEROL < 130      Weight < 190 lb (86.183 kg)           Follow up: No follow-ups on file.    After visit summary was printed and given to patient upon discharge today.  Patient goals and care plan are included in After Visit Summary.

## 2019-06-19 ENCOUNTER — OFFICE VISIT (OUTPATIENT)
Dept: INTERNAL MEDICINE | Facility: CLINIC | Age: 69
End: 2019-06-19
Payer: MEDICARE

## 2019-06-19 VITALS
SYSTOLIC BLOOD PRESSURE: 104 MMHG | HEIGHT: 64 IN | BODY MASS INDEX: 42.12 KG/M2 | TEMPERATURE: 97 F | DIASTOLIC BLOOD PRESSURE: 68 MMHG | WEIGHT: 246.69 LBS | OXYGEN SATURATION: 96 % | HEART RATE: 92 BPM

## 2019-06-19 DIAGNOSIS — N64.4 BREAST PAIN, LEFT: Primary | ICD-10-CM

## 2019-06-19 PROCEDURE — 1100F PTFALLS ASSESS-DOCD GE2>/YR: CPT | Mod: CPTII,S$GLB,, | Performed by: FAMILY MEDICINE

## 2019-06-19 PROCEDURE — 1100F PR PT FALLS ASSESS DOC 2+ FALLS/FALL W/INJURY/YR: ICD-10-PCS | Mod: CPTII,S$GLB,, | Performed by: FAMILY MEDICINE

## 2019-06-19 PROCEDURE — 99999 PR PBB SHADOW E&M-EST. PATIENT-LVL III: ICD-10-PCS | Mod: PBBFAC,,, | Performed by: FAMILY MEDICINE

## 2019-06-19 PROCEDURE — 3288F PR FALLS RISK ASSESSMENT DOCUMENTED: ICD-10-PCS | Mod: CPTII,S$GLB,, | Performed by: FAMILY MEDICINE

## 2019-06-19 PROCEDURE — 99214 PR OFFICE/OUTPT VISIT, EST, LEVL IV, 30-39 MIN: ICD-10-PCS | Mod: S$GLB,,, | Performed by: FAMILY MEDICINE

## 2019-06-19 PROCEDURE — 99214 OFFICE O/P EST MOD 30 MIN: CPT | Mod: S$GLB,,, | Performed by: FAMILY MEDICINE

## 2019-06-19 PROCEDURE — 3288F FALL RISK ASSESSMENT DOCD: CPT | Mod: CPTII,S$GLB,, | Performed by: FAMILY MEDICINE

## 2019-06-19 PROCEDURE — 99999 PR PBB SHADOW E&M-EST. PATIENT-LVL III: CPT | Mod: PBBFAC,,, | Performed by: FAMILY MEDICINE

## 2019-06-20 ENCOUNTER — PATIENT MESSAGE (OUTPATIENT)
Dept: INTERNAL MEDICINE | Facility: CLINIC | Age: 69
End: 2019-06-20

## 2019-06-21 ENCOUNTER — HOSPITAL ENCOUNTER (OUTPATIENT)
Dept: RADIOLOGY | Facility: HOSPITAL | Age: 69
Discharge: HOME | End: 2019-06-21
Attending: FAMILY MEDICINE
Payer: MEDICARE

## 2019-06-21 VITALS — WEIGHT: 246 LBS | BODY MASS INDEX: 42 KG/M2 | HEIGHT: 64 IN

## 2019-06-21 DIAGNOSIS — N64.4 BREAST PAIN, LEFT: ICD-10-CM

## 2019-06-21 PROCEDURE — 77061 BREAST TOMOSYNTHESIS UNI: CPT | Mod: TC,LT

## 2019-06-21 PROCEDURE — 77065 DX MAMMO INCL CAD UNI: CPT | Mod: 26,LT,, | Performed by: RADIOLOGY

## 2019-06-21 PROCEDURE — 76642 ULTRASOUND BREAST LIMITED: CPT | Mod: 26,LT,, | Performed by: RADIOLOGY

## 2019-06-21 PROCEDURE — 76642 ULTRASOUND BREAST LIMITED: CPT | Mod: TC,LT

## 2019-06-21 PROCEDURE — 77061 BREAST TOMOSYNTHESIS UNI: CPT | Mod: 26,LT,, | Performed by: RADIOLOGY

## 2019-06-21 PROCEDURE — 77065 MAMMO DIGITAL DIAGNOSTIC LEFT WITH TOMOSYNTHESIS_CAD: ICD-10-PCS | Mod: 26,LT,, | Performed by: RADIOLOGY

## 2019-06-21 PROCEDURE — 77065 DX MAMMO INCL CAD UNI: CPT | Mod: TC,LT

## 2019-06-21 PROCEDURE — 76642 US BREAST LEFT LIMITED: ICD-10-PCS | Mod: 26,LT,, | Performed by: RADIOLOGY

## 2019-06-21 PROCEDURE — 77061 MAMMO DIGITAL DIAGNOSTIC LEFT WITH TOMOSYNTHESIS_CAD: ICD-10-PCS | Mod: 26,LT,, | Performed by: RADIOLOGY

## 2019-06-24 NOTE — PROGRESS NOTES
Still could be muscle or chronic shoulder related. It does not appear to be with the breast.    She mentioned trouble with left shoulder, could consider PT?

## 2019-08-21 NOTE — TELEPHONE ENCOUNTER
Called and spoke with patient. Patient asked for a refill on her mobic. She asked for a year supply I told her I would just send a message to you regarding getting a refill sent to [facility].

## 2019-08-21 NOTE — TELEPHONE ENCOUNTER
----- Message from Aislinn Salazar sent at 8/21/2019  4:12 PM CDT -----  Contact: Pt  Please give pt a call at .[phone] (home) regarding some questions about a medication.

## 2019-08-22 RX ORDER — MELOXICAM 15 MG/1
15 TABLET ORAL DAILY
Qty: 30 TABLET | Refills: 2 | Status: SHIPPED | OUTPATIENT
Start: 2019-08-22 | End: 2019-09-21

## 2019-09-30 DIAGNOSIS — J30.2 SEASONAL ALLERGIC RHINITIS: ICD-10-CM

## 2019-09-30 RX ORDER — FLUTICASONE PROPIONATE 50 MCG
SPRAY, SUSPENSION (ML) NASAL
Qty: 16 G | Refills: 5 | Status: SHIPPED | OUTPATIENT
Start: 2019-09-30 | End: 2020-05-22

## 2020-01-02 DIAGNOSIS — K21.9 GASTROESOPHAGEAL REFLUX DISEASE, ESOPHAGITIS PRESENCE NOT SPECIFIED: ICD-10-CM

## 2020-01-02 RX ORDER — MELOXICAM 15 MG/1
15 TABLET ORAL DAILY
Qty: 30 TABLET | Refills: 0 | Status: SHIPPED | OUTPATIENT
Start: 2020-01-02 | End: 2020-02-02

## 2020-01-02 RX ORDER — PANTOPRAZOLE SODIUM 20 MG/1
20 TABLET, DELAYED RELEASE ORAL DAILY
Qty: 90 TABLET | Refills: 3 | Status: SHIPPED | OUTPATIENT
Start: 2020-01-02 | End: 2021-02-24

## 2020-01-02 RX ORDER — MELOXICAM 15 MG/1
TABLET ORAL
COMMUNITY
Start: 2019-09-28 | End: 2020-01-02 | Stop reason: SDUPTHER

## 2020-01-02 NOTE — TELEPHONE ENCOUNTER
----- Message from Brenda Desai sent at 1/2/2020  7:55 AM CST -----  Contact: Patient  .Type:  RX Refill Request    Who Called: Patient  Refill or New Rx:Refill  RX Name and Strength:pantoprazole (PROTONIX) 20 MG tablet,  Maloxican(Mobic)  How is the patient currently taking it? (ex. 1XDay):  Is this a 30 day or 90 day RX: 90 day if possible  Preferred Pharmacy with phone number: [facility] [place] - [address]  [address]  [place] [address]  Phone: [phone] Fax: [phone]    Local or Mail Order:Local  Ordering Provider: Petar  Would the patient rather a call back or a response via [facility]? Call  Best Call Back Number: .[phone] (home)   Additional Information:

## 2020-02-02 RX ORDER — MELOXICAM 15 MG/1
TABLET ORAL
Qty: 30 TABLET | Refills: 0 | Status: SHIPPED | OUTPATIENT
Start: 2020-02-02 | End: 2020-04-16

## 2020-04-16 RX ORDER — MELOXICAM 15 MG/1
TABLET ORAL
Qty: 30 TABLET | Refills: 0 | Status: SHIPPED | OUTPATIENT
Start: 2020-04-16 | End: 2020-06-27 | Stop reason: SDUPTHER

## 2020-05-22 DIAGNOSIS — J30.2 SEASONAL ALLERGIC RHINITIS: ICD-10-CM

## 2020-05-22 RX ORDER — FLUTICASONE PROPIONATE 50 MCG
SPRAY, SUSPENSION (ML) NASAL
Qty: 16 G | Refills: 4 | Status: SHIPPED | OUTPATIENT
Start: 2020-05-22 | End: 2021-06-01 | Stop reason: SDUPTHER

## 2020-06-17 ENCOUNTER — OFFICE VISIT (OUTPATIENT)
Dept: OPHTHALMOLOGY | Facility: CLINIC | Age: 70
End: 2020-06-17
Payer: MEDICARE

## 2020-06-17 DIAGNOSIS — H04.123 DRY EYE SYNDROME, BILATERAL: ICD-10-CM

## 2020-06-17 DIAGNOSIS — H43.811 PVD (POSTERIOR VITREOUS DETACHMENT), RIGHT: Primary | ICD-10-CM

## 2020-06-17 DIAGNOSIS — Z96.1 PSEUDOPHAKIA OF BOTH EYES: ICD-10-CM

## 2020-06-17 PROCEDURE — 92004 COMPRE OPH EXAM NEW PT 1/>: CPT | Mod: S$GLB,,, | Performed by: OPHTHALMOLOGY

## 2020-06-17 PROCEDURE — 99999 PR PBB SHADOW E&M-EST. PATIENT-LVL II: CPT | Mod: PBBFAC,,, | Performed by: OPHTHALMOLOGY

## 2020-06-17 PROCEDURE — 92250 FUNDUS PHOTOGRAPHY W/I&R: CPT | Mod: S$GLB,,, | Performed by: OPHTHALMOLOGY

## 2020-06-17 PROCEDURE — 92250 COLOR FUNDUS PHOTOGRAPHY - OU - BOTH EYES: ICD-10-PCS | Mod: S$GLB,,, | Performed by: OPHTHALMOLOGY

## 2020-06-17 PROCEDURE — 92004 PR EYE EXAM, NEW PATIENT,COMPREHESV: ICD-10-PCS | Mod: S$GLB,,, | Performed by: OPHTHALMOLOGY

## 2020-06-17 PROCEDURE — 99999 PR PBB SHADOW E&M-EST. PATIENT-LVL II: ICD-10-PCS | Mod: PBBFAC,,, | Performed by: OPHTHALMOLOGY

## 2020-06-17 RX ORDER — MOMETASONE FUROATE 50 UG/1
SPRAY, METERED NASAL
COMMUNITY
Start: 2017-12-07 | End: 2021-06-30

## 2020-06-17 RX ORDER — PANTOPRAZOLE SODIUM 20 MG/1
TABLET, DELAYED RELEASE ORAL
COMMUNITY
Start: 2020-04-16 | End: 2021-06-30

## 2020-06-17 NOTE — PROGRESS NOTES
HPI     Spots and/or Floaters     Comments: OD large black spot in vision since February              Comments     Pt states she's been having a large black spot in her vision in the right   eye since February. Hasn't changed, no flashes. Also see small spots at   times. No visual complaints, happy with just readers. States MGM did her   cataract surgery 20 years ago and also saw her for a really bad eye   infection years ago. She's not using any drops right now.           Last edited by Jean Wallis on 6/17/2020  3:31 PM. (History)            Assessment /Plan     For exam results, see Encounter Report.      ICD-10-CM ICD-9-CM    1. PVD (posterior vitreous detachment), right  H43.811 379.21 Color Fundus Photography - OU - Both Eyes    Patient seen and evaluated for PVD in the  right eye. No tears or breaks were seen after careful retinal evaluation. Discussed retinal detachment signs and symptoms including flashes of lights, floaters, perceived curtains or veils. Advised to patient to monitor visual status including increase in flashes and floaters, or the development of visual field changes including curtain and /or veils. Advised patient to RTC urgently if these symptoms occur. Explained the need for follow up exams to the patient even if there are no changes in the symptoms.           2. Dry eye syndrome, bilateral  H04.123 375.15 Recommend systane ultra qid ou systane gel qhs ou    3. Pseudophakia of both eyes  Z96.1 V43.1 stable       1 year

## 2020-06-29 ENCOUNTER — TELEPHONE (OUTPATIENT)
Dept: INTERNAL MEDICINE | Facility: CLINIC | Age: 70
End: 2020-06-29

## 2020-06-29 NOTE — TELEPHONE ENCOUNTER
Called and spoke with patient. She is scheduled for an appointment on wed. She said she is completely out of her medication. She asked if she can get a few of the mobic  until Wednesday.

## 2020-06-29 NOTE — TELEPHONE ENCOUNTER
----- Message from Anna Castellon sent at 6/29/2020 12:35 PM CDT -----  Regarding: call back  Contact: pt  Type:  Patient Returning Call    Who Called: pt   Who Left Message for Patient: Dr Davey   Does the patient know what this is regarding?:   Would the patient rather a call back or a response via [facility]?  Call back   Best Call Back Number: [phone]  Additional Information:

## 2020-07-09 ENCOUNTER — PATIENT OUTREACH (OUTPATIENT)
Dept: ADMINISTRATIVE | Facility: HOSPITAL | Age: 70
End: 2020-07-09

## 2020-07-09 DIAGNOSIS — Z12.31 ENCOUNTER FOR SCREENING MAMMOGRAM FOR BREAST CANCER: Primary | ICD-10-CM

## 2020-07-09 NOTE — PROGRESS NOTES
Chart audit completed.   Updated.  Care Everywhere-No new records abstracted.  Lab [facility]-No new records entered/scanned to chart.  Links-Immunizations-abstracted/updated.    Mammo ordered per guidelines.  Mammo scheduled. AWV scheduled.   Mess sent to  requesting lab orders per pt request to be completed prior to  appt.

## 2020-07-20 ENCOUNTER — OFFICE VISIT (OUTPATIENT)
Dept: INTERNAL MEDICINE | Facility: CLINIC | Age: 70
End: 2020-07-20
Payer: MEDICARE

## 2020-07-20 DIAGNOSIS — J06.9 VIRAL URI: Primary | ICD-10-CM

## 2020-07-20 PROCEDURE — 99213 PR OFFICE/OUTPT VISIT, EST, LEVL III, 20-29 MIN: ICD-10-PCS | Mod: 95,,, | Performed by: INTERNAL MEDICINE

## 2020-07-20 PROCEDURE — 1159F MED LIST DOCD IN RCRD: CPT | Mod: 95,,, | Performed by: INTERNAL MEDICINE

## 2020-07-20 PROCEDURE — 99213 OFFICE O/P EST LOW 20 MIN: CPT | Mod: 95,,, | Performed by: INTERNAL MEDICINE

## 2020-07-20 PROCEDURE — 1159F PR MEDICATION LIST DOCUMENTED IN MEDICAL RECORD: ICD-10-PCS | Mod: 95,,, | Performed by: INTERNAL MEDICINE

## 2020-07-20 NOTE — PATIENT INSTRUCTIONS
Viral Upper Respiratory Illness (Adult)  You have a viral upper respiratory illness (URI), which is another term for the common cold. This illness is contagious during the first few days. It is spread through the air by coughing and sneezing. It may also be spread by direct contact (touching the sick person and then touching your own eyes, nose, or mouth). Frequent handwashing will decrease risk of spread. Most viral illnesses go away within 7 to 10 days with rest and simple home remedies. Sometimes the illness may last for several weeks. Antibiotics will not kill a virus, and they are generally not prescribed for this condition.    Home care  · If symptoms are severe, rest at home for the first 2 to 3 days. When you resume activity, don't let yourself get too tired.  · Avoid being exposed to cigarette smoke (yours or others).  · You may use acetaminophen or ibuprofen to control pain and fever, unless another medicine was prescribed. (Note: If you have chronic liver or kidney disease, have ever had a stomach ulcer or gastrointestinal bleeding, or are taking blood-thinning medicines, talk with your healthcare provider before using these medicines.) Aspirin should never be given to anyone under 18 years of age who is ill with a viral infection or fever. It may cause severe liver or brain damage.  · Your appetite may be poor, so a light diet is fine. Avoid dehydration by drinking 6 to 8 glasses of fluids per day (water, soft drinks, juices, tea, or soup). Extra fluids will help loosen secretions in the nose and lungs.  · Over-the-counter cold medicines will not shorten the length of time youre sick, but they may be helpful for the following symptoms: cough, sore throat, and nasal and sinus congestion. (Note: Do not use decongestants if you have high blood pressure.)  Follow-up care  Follow up with your healthcare provider, or as advised.  When to seek medical advice  Call your healthcare provider right away if any  of these occur:  · Cough with lots of colored sputum (mucus)  · Severe headache; face, neck, or ear pain  · Difficulty swallowing due to throat pain  · Fever of 100.4°F (38°C)  Call 911, or get immediate medical care  Call emergency services right away if any of these occur:  · Chest pain, shortness of breath, wheezing, or difficulty breathing  · Coughing up blood  · Inability to swallow due to throat pain  Date Last Reviewed: 9/13/2015  © 3084-6069 [employer]. [address] [place] [address]. All rights reserved. This information is not intended as a substitute for professional medical care. Always follow your healthcare professional's instructions.

## 2020-07-20 NOTE — PROGRESS NOTES
Subjective:       Patient ID: Dory Xie is a 70 y.o. female.    Chief Complaint: URI    Dory Xie  70 y.o. White female     Patient presents with:  URI    The patient location is: [place]   The chief complaint leading to consultation is: fever, cough, headache     Visit type: audiovisual    Face to Face time with patient: 11 minutes  11 minutes of total time spent on the encounter, which includes face to face time and non-face to face time preparing to see the patient (eg, review of tests), Obtaining and/or reviewing separately obtained history, Documenting clinical information in the electronic or other health record, Independently interpreting results (not separately reported) and communicating results to the patient/family/caregiver, or Care coordination (not separately reported).     Each patient to whom he or she provides medical services by telemedicine is:  (1) informed of the relationship between the physician and patient and the respective role of any other health care provider with respect to management of the patient; and (2) notified that he or she may decline to receive medical services by telemedicine and may withdraw from such care at any time.     HPI: Reports fever, headache, cough and diarrhea x 3 days. She has been taking Dayquil and Nyquil and states it seems to be helping. She states the fever has resolved. She denies being around any sick contacts including anyone with COVID 19.     PMH: Reviewed    MEDS: Reviewed med card    ALLERGIES: Reviewed allergy card        Fever   This is a new problem. The current episode started in the past 7 days. The problem occurs daily. The problem has been unchanged. Her temperature was unmeasured prior to arrival. Associated symptoms include congestion, coughing, diarrhea, headaches and sleepiness. Pertinent negatives include no abdominal pain, chest pain, ear pain, muscle aches, nausea, rash, sore throat, urinary pain, vomiting or wheezing.  She has tried acetaminophen and fluids for the symptoms. The treatment provided moderate relief.     Review of Systems   Constitutional: Positive for fever.   HENT: Positive for nasal congestion. Negative for ear pain and sore throat.    Respiratory: Positive for cough. Negative for wheezing.    Cardiovascular: Negative for chest pain.   Gastrointestinal: Positive for diarrhea. Negative for abdominal pain, nausea and vomiting.   Genitourinary: Negative for dysuria.   Integumentary:  Negative for rash.   Neurological: Positive for headaches.         Objective:      Physical Exam  Constitutional:       General: She is not in acute distress.     Appearance: She is well-developed.   Pulmonary:      Effort: Pulmonary effort is normal. No respiratory distress.   Neurological:      Mental Status: She is alert and oriented to person, place, and time.   Psychiatric:         Behavior: Behavior normal.         Thought Content: Thought content normal.         Judgment: Judgment normal.         Assessment:       1. Viral URI        Plan:       Dory was seen today for uri.    Diagnoses and all orders for this visit:    Viral URI  -     Antibiotics not indicated--discussed with patient   -     Continue Nyquil and Dayquil as needed  -     Recommend increased fluids and rest  -     Educational information provided    RTC: If symptoms worsen or fail to resolve

## 2020-09-17 ENCOUNTER — HOSPITAL ENCOUNTER (OUTPATIENT)
Dept: RADIOLOGY | Facility: HOSPITAL | Age: 70
Discharge: HOME | End: 2020-09-17
Attending: FAMILY MEDICINE
Payer: MEDICARE

## 2020-09-17 VITALS — WEIGHT: 246.06 LBS | HEIGHT: 64 IN | BODY MASS INDEX: 42.01 KG/M2

## 2020-09-17 DIAGNOSIS — Z12.31 ENCOUNTER FOR SCREENING MAMMOGRAM FOR BREAST CANCER: ICD-10-CM

## 2020-09-17 PROCEDURE — 77067 SCR MAMMO BI INCL CAD: CPT | Mod: 26,,, | Performed by: RADIOLOGY

## 2020-09-17 PROCEDURE — 77067 SCR MAMMO BI INCL CAD: CPT | Mod: TC

## 2020-09-17 PROCEDURE — 77067 MAMMO DIGITAL SCREENING BILAT WITH TOMO: ICD-10-PCS | Mod: 26,,, | Performed by: RADIOLOGY

## 2020-09-17 PROCEDURE — 77063 MAMMO DIGITAL SCREENING BILAT WITH TOMO: ICD-10-PCS | Mod: 26,,, | Performed by: RADIOLOGY

## 2020-09-17 PROCEDURE — 77063 BREAST TOMOSYNTHESIS BI: CPT | Mod: 26,,, | Performed by: RADIOLOGY

## 2021-01-07 ENCOUNTER — PATIENT MESSAGE (OUTPATIENT)
Dept: ADMINISTRATIVE | Facility: OTHER | Age: 71
End: 2021-01-07

## 2021-02-24 DIAGNOSIS — K21.9 GASTROESOPHAGEAL REFLUX DISEASE: ICD-10-CM

## 2021-02-24 RX ORDER — PANTOPRAZOLE SODIUM 20 MG/1
TABLET, DELAYED RELEASE ORAL
Qty: 90 TABLET | Refills: 3 | Status: SHIPPED | OUTPATIENT
Start: 2021-02-24 | End: 2022-01-26 | Stop reason: SDUPTHER

## 2021-04-29 ENCOUNTER — PATIENT MESSAGE (OUTPATIENT)
Dept: RESEARCH | Facility: HOSPITAL | Age: 71
End: 2021-04-29

## 2021-05-29 DIAGNOSIS — J30.2 SEASONAL ALLERGIC RHINITIS: ICD-10-CM

## 2021-06-01 DIAGNOSIS — J30.2 SEASONAL ALLERGIC RHINITIS: ICD-10-CM

## 2021-06-01 RX ORDER — FLUTICASONE PROPIONATE 50 MCG
2 SPRAY, SUSPENSION (ML) NASAL DAILY
Qty: 16 G | Refills: 4 | Status: SHIPPED | OUTPATIENT
Start: 2021-06-01 | End: 2023-12-20

## 2021-06-02 RX ORDER — FLUTICASONE PROPIONATE 50 MCG
SPRAY, SUSPENSION (ML) NASAL
Qty: 16 ML | Refills: 4 | OUTPATIENT
Start: 2021-06-02

## 2021-06-11 ENCOUNTER — TELEPHONE (OUTPATIENT)
Dept: OPHTHALMOLOGY | Facility: CLINIC | Age: 71
End: 2021-06-11

## 2021-06-14 ENCOUNTER — TELEPHONE (OUTPATIENT)
Dept: OPHTHALMOLOGY | Facility: CLINIC | Age: 71
End: 2021-06-14

## 2021-06-30 ENCOUNTER — OFFICE VISIT (OUTPATIENT)
Dept: OPHTHALMOLOGY | Facility: CLINIC | Age: 71
End: 2021-06-30
Payer: MEDICARE

## 2021-06-30 DIAGNOSIS — Z96.1 PSEUDOPHAKIA OF BOTH EYES: ICD-10-CM

## 2021-06-30 DIAGNOSIS — H04.123 DRY EYE SYNDROME, BILATERAL: Primary | ICD-10-CM

## 2021-06-30 DIAGNOSIS — H26.491 PCO (POSTERIOR CAPSULAR OPACIFICATION), RIGHT: ICD-10-CM

## 2021-06-30 PROCEDURE — 99214 OFFICE O/P EST MOD 30 MIN: CPT | Mod: S$GLB,,, | Performed by: OPHTHALMOLOGY

## 2021-06-30 PROCEDURE — 1159F MED LIST DOCD IN RCRD: CPT | Mod: S$GLB,,, | Performed by: OPHTHALMOLOGY

## 2021-06-30 PROCEDURE — 1159F PR MEDICATION LIST DOCUMENTED IN MEDICAL RECORD: ICD-10-PCS | Mod: S$GLB,,, | Performed by: OPHTHALMOLOGY

## 2021-06-30 PROCEDURE — 99999 PR PBB SHADOW E&M-EST. PATIENT-LVL II: ICD-10-PCS | Mod: PBBFAC,,, | Performed by: OPHTHALMOLOGY

## 2021-06-30 PROCEDURE — 99214 PR OFFICE/OUTPT VISIT, EST, LEVL IV, 30-39 MIN: ICD-10-PCS | Mod: S$GLB,,, | Performed by: OPHTHALMOLOGY

## 2021-06-30 PROCEDURE — 99999 PR PBB SHADOW E&M-EST. PATIENT-LVL II: CPT | Mod: PBBFAC,,, | Performed by: OPHTHALMOLOGY

## 2021-10-13 ENCOUNTER — TELEPHONE (OUTPATIENT)
Dept: FAMILY MEDICINE | Facility: CLINIC | Age: 71
End: 2021-10-13

## 2022-01-10 ENCOUNTER — TELEPHONE (OUTPATIENT)
Dept: ADMINISTRATIVE | Facility: HOSPITAL | Age: 72
End: 2022-01-10
Payer: MEDICARE

## 2022-01-26 ENCOUNTER — OFFICE VISIT (OUTPATIENT)
Dept: INTERNAL MEDICINE | Facility: CLINIC | Age: 72
End: 2022-01-26
Payer: MEDICARE

## 2022-01-26 VITALS
HEART RATE: 103 BPM | DIASTOLIC BLOOD PRESSURE: 70 MMHG | BODY MASS INDEX: 39.82 KG/M2 | SYSTOLIC BLOOD PRESSURE: 114 MMHG | HEIGHT: 64 IN | TEMPERATURE: 96 F | OXYGEN SATURATION: 98 % | WEIGHT: 233.25 LBS

## 2022-01-26 DIAGNOSIS — Z13.220 ENCOUNTER FOR SCREENING FOR LIPID DISORDER: ICD-10-CM

## 2022-01-26 DIAGNOSIS — Z78.0 POSTMENOPAUSAL: ICD-10-CM

## 2022-01-26 DIAGNOSIS — R79.9 ABNORMAL FINDING OF BLOOD CHEMISTRY, UNSPECIFIED: ICD-10-CM

## 2022-01-26 DIAGNOSIS — Z12.31 SCREENING MAMMOGRAM, ENCOUNTER FOR: ICD-10-CM

## 2022-01-26 DIAGNOSIS — K21.9 GASTROESOPHAGEAL REFLUX DISEASE: ICD-10-CM

## 2022-01-26 DIAGNOSIS — J30.2 SEASONAL ALLERGIES: ICD-10-CM

## 2022-01-26 DIAGNOSIS — F32.1 MAJOR DEPRESSIVE DISORDER, SINGLE EPISODE, MODERATE: ICD-10-CM

## 2022-01-26 DIAGNOSIS — Z00.00 ANNUAL PHYSICAL EXAM: Primary | ICD-10-CM

## 2022-01-26 PROCEDURE — 3074F SYST BP LT 130 MM HG: CPT | Mod: CPTII,S$GLB,, | Performed by: FAMILY MEDICINE

## 2022-01-26 PROCEDURE — 1159F PR MEDICATION LIST DOCUMENTED IN MEDICAL RECORD: ICD-10-PCS | Mod: CPTII,S$GLB,, | Performed by: FAMILY MEDICINE

## 2022-01-26 PROCEDURE — 99999 PR PBB SHADOW E&M-EST. PATIENT-LVL III: ICD-10-PCS | Mod: PBBFAC,,, | Performed by: FAMILY MEDICINE

## 2022-01-26 PROCEDURE — 3074F PR MOST RECENT SYSTOLIC BLOOD PRESSURE < 130 MM HG: ICD-10-PCS | Mod: CPTII,S$GLB,, | Performed by: FAMILY MEDICINE

## 2022-01-26 PROCEDURE — 1101F PR PT FALLS ASSESS DOC 0-1 FALLS W/OUT INJ PAST YR: ICD-10-PCS | Mod: CPTII,S$GLB,, | Performed by: FAMILY MEDICINE

## 2022-01-26 PROCEDURE — 3078F DIAST BP <80 MM HG: CPT | Mod: CPTII,S$GLB,, | Performed by: FAMILY MEDICINE

## 2022-01-26 PROCEDURE — 99999 PR PBB SHADOW E&M-EST. PATIENT-LVL III: CPT | Mod: PBBFAC,,, | Performed by: FAMILY MEDICINE

## 2022-01-26 PROCEDURE — 1160F PR REVIEW ALL MEDS BY PRESCRIBER/CLIN PHARMACIST DOCUMENTED: ICD-10-PCS | Mod: CPTII,S$GLB,, | Performed by: FAMILY MEDICINE

## 2022-01-26 PROCEDURE — 3288F PR FALLS RISK ASSESSMENT DOCUMENTED: ICD-10-PCS | Mod: CPTII,S$GLB,, | Performed by: FAMILY MEDICINE

## 2022-01-26 PROCEDURE — 3008F BODY MASS INDEX DOCD: CPT | Mod: CPTII,S$GLB,, | Performed by: FAMILY MEDICINE

## 2022-01-26 PROCEDURE — 3288F FALL RISK ASSESSMENT DOCD: CPT | Mod: CPTII,S$GLB,, | Performed by: FAMILY MEDICINE

## 2022-01-26 PROCEDURE — 1160F RVW MEDS BY RX/DR IN RCRD: CPT | Mod: CPTII,S$GLB,, | Performed by: FAMILY MEDICINE

## 2022-01-26 PROCEDURE — 99214 OFFICE O/P EST MOD 30 MIN: CPT | Mod: S$GLB,,, | Performed by: FAMILY MEDICINE

## 2022-01-26 PROCEDURE — 3008F PR BODY MASS INDEX (BMI) DOCUMENTED: ICD-10-PCS | Mod: CPTII,S$GLB,, | Performed by: FAMILY MEDICINE

## 2022-01-26 PROCEDURE — 99214 PR OFFICE/OUTPT VISIT, EST, LEVL IV, 30-39 MIN: ICD-10-PCS | Mod: S$GLB,,, | Performed by: FAMILY MEDICINE

## 2022-01-26 PROCEDURE — 1159F MED LIST DOCD IN RCRD: CPT | Mod: CPTII,S$GLB,, | Performed by: FAMILY MEDICINE

## 2022-01-26 PROCEDURE — 1101F PT FALLS ASSESS-DOCD LE1/YR: CPT | Mod: CPTII,S$GLB,, | Performed by: FAMILY MEDICINE

## 2022-01-26 PROCEDURE — 3078F PR MOST RECENT DIASTOLIC BLOOD PRESSURE < 80 MM HG: ICD-10-PCS | Mod: CPTII,S$GLB,, | Performed by: FAMILY MEDICINE

## 2022-01-26 RX ORDER — AZELASTINE 1 MG/ML
1 SPRAY, METERED NASAL 2 TIMES DAILY
Qty: 30 ML | Refills: 11 | Status: SHIPPED | OUTPATIENT
Start: 2022-01-26 | End: 2022-03-29

## 2022-01-26 RX ORDER — FLUOXETINE HYDROCHLORIDE 20 MG/1
20 CAPSULE ORAL DAILY
Qty: 30 CAPSULE | Refills: 11 | Status: SHIPPED | OUTPATIENT
Start: 2022-01-26 | End: 2023-02-03 | Stop reason: SDUPTHER

## 2022-01-26 RX ORDER — PANTOPRAZOLE SODIUM 20 MG/1
20 TABLET, DELAYED RELEASE ORAL DAILY
Qty: 90 TABLET | Refills: 3 | Status: SHIPPED | OUTPATIENT
Start: 2022-01-26 | End: 2023-02-03 | Stop reason: SDUPTHER

## 2022-01-26 RX ORDER — CETIRIZINE HYDROCHLORIDE 10 MG/1
10 TABLET ORAL DAILY
COMMUNITY
End: 2022-01-26

## 2022-01-26 RX ORDER — LORATADINE 10 MG/1
10 TABLET ORAL DAILY
Qty: 30 TABLET | Refills: 11 | Status: SHIPPED | OUTPATIENT
Start: 2022-01-26 | End: 2023-02-20 | Stop reason: SDUPTHER

## 2022-01-26 NOTE — PROGRESS NOTES
Patient, Dory Xie (MRN #[id number]), presented with a recent Platelet count less than 150 K/uL consistent with the definition of thrombocytopenia (ICD10 - D69.6).    Platelets   Date Value Ref Range Status   11/30/2018 146 (L) 150 - 350 K/uL Final     The patient's thrombocytopenia was monitored, evaluated, addressed and/or treated. This addendum to the medical record is made on 01/26/2022.

## 2022-01-26 NOTE — PROGRESS NOTES
Dory Xie  2022  [id number]    Makenna Davey MD  Patient Care Team:  Makenna Davey MD as PCP - General (Family Medicine)  Kely Salazar MD as Consulting Physician (Physical Medicine and Rehabilitation)  Iliana Benoit LPN as Care Coordinator (Internal Medicine)  Sorin Mcmanus MD as Consulting Physician (Ophthalmology)  Has the patient seen any provider outside of the [facility] network since the last visit? (no). If yes, HIPPA forms completed and records requested.        Visit Type:a scheduled routine follow-up visit    Chief Complaint:  Chief Complaint   Patient presents with    Annual Exam     Patient has a lump on the left side of her face jaw bone area       History of Present Illness:  72 year old  Here for annual exam    Health Maintenance Due   Topic Date Due    TETANUS VACCINE  Never done    Shingles Vaccine (2 of 3) 2012    Lipid Panel  09/10/2019    DEXA SCAN  2020    COVID-19 Vaccine (3 - Booster) 2021    Influenza Vaccine (1) 2021    Mammogram  2021     She has had significant trauma and grief. She reports sister  and in Hospice. Had Cancer.    Her daughter is at home. Vascular surgery.    Son is in hospital, amputation, blood clot, and he is paralyzed.        History:  Past Medical History:   Diagnosis Date    Arthritis     Cancer     cervical    Chronic pain     From osteoarthritis    Colitis     GERD (gastroesophageal reflux disease)     Seasonal allergies      Past Surgical History:   Procedure Laterality Date    COLONOSCOPY N/A 2016    Procedure: COLONOSCOPY;  Surgeon: Hector Summers III, MD;  Location[facility];  Service: Endoscopy;  Laterality: N/A;    EYE SURGERY      HYSTERECTOMY      29yrs old    KNEE SURGERY Left     KNEE SURGERY Right     TONSILLECTOMY      TUBAL LIGATION       Family History   Problem Relation Age of Onset    Diabetes Mother     Heart disease Mother     Cancer Mother         lung     Cancer Father         Lung    Hyperlipidemia Father     Hypertension Unknown     Breast cancer Sister      Social History     Socioeconomic History    Marital status: Single   Tobacco Use    Smoking status: Never Smoker    Smokeless tobacco: Never Used   Substance and Sexual Activity    Alcohol use: No    Drug use: No     Patient Active Problem List   Diagnosis    Primary osteoarthritis of both knees    Chronic pain    Diet-controlled hyperlipidemia    Seasonal allergies    Osteoporosis    BMI of 40.0-44.9, adult    Family history of arteriosclerotic cardiovascular disease    Colitis    Vertigo     Review of patient's allergies indicates:  No Known Allergies    The following were reviewed at this visit: active problem list, medication list, allergies, family history, social history, and health maintenance.    Medications:  Current Outpatient Medications on File Prior to Visit   Medication Sig Dispense Refill    fluticasone propionate (FLONASE) 50 mcg/actuation nasal spray 2 sprays (100 mcg total) by Each Nostril route once daily. 16 g 4    potassium gluconate 595 (99) mg Tab Take 1 tablet by mouth Daily.      [DISCONTINUED] cetirizine (ZYRTEC) 10 MG tablet Take 10 mg by mouth once daily.      [DISCONTINUED] pantoprazole (PROTONIX) 20 MG tablet TAKE ONE (1) TABLET BY MOUTH EVERY DAY  90 tablet 3    aspirin 325 MG tablet Take 325 mg by mouth once daily.      meloxicam (MOBIC) 15 MG tablet Take 1 tablet (15 mg total) by mouth once daily. (Patient not taking: Reported on 1/26/2022) 5 tablet 0     No current facility-administered medications on file prior to visit.       Medications have been reviewed and reconciled with patient at this visit.  Barriers to medications reviewed with patient.    Adverse reactions to current medications reviewed with patient..    Over the counter medications reviewed and reconciled with patient.    Exam:  Wt Readings from Last 3 Encounters:   01/26/22 105.8 kg (233 lb  4 oz)   09/17/20 111.6 kg (246 lb 0.5 oz)   06/21/19 111.6 kg (246 lb)     Temp Readings from Last 3 Encounters:   01/26/22 96 °F (35.6 °C) (Tympanic)   06/19/19 96.6 °F (35.9 °C)   02/08/19 97.5 °F (36.4 °C) (Tympanic)     BP Readings from Last 3 Encounters:   01/26/22 114/70   06/19/19 104/68   02/08/19 136/64     Pulse Readings from Last 3 Encounters:   01/26/22 103   06/19/19 92   02/08/19 91     Body mass index is 40.67 kg/m².      Review of Systems   Constitutional: Negative.  Negative for chills and fever.   HENT: Positive for congestion. Negative for sinus pain and sore throat.    Eyes: Negative for blurred vision and double vision.   Respiratory: Negative for cough, sputum production, shortness of breath and wheezing.    Cardiovascular: Negative for chest pain, palpitations and leg swelling.   Gastrointestinal: Negative for abdominal pain, constipation, diarrhea, heartburn, nausea and vomiting.   Genitourinary: Negative.    Musculoskeletal: Negative.    Skin: Negative.  Negative for rash.   Neurological: Negative.    Endo/Heme/Allergies: Negative.  Negative for polydipsia. Does not bruise/bleed easily.   Psychiatric/Behavioral: Positive for depression. Negative for substance abuse.     Physical Exam  Vitals and nursing note reviewed.   Constitutional:       General: She is not in acute distress.     Appearance: She is well-developed. She is not diaphoretic.   HENT:      Head: Normocephalic and atraumatic.      Right Ear: External ear normal.      Left Ear: External ear normal.      Nose: Congestion and rhinorrhea present.   Eyes:      General:         Right eye: No discharge.         Left eye: No discharge.      Conjunctiva/sclera: Conjunctivae normal.      Pupils: Pupils are equal, round, and reactive to light.   Neck:      Thyroid: No thyromegaly.   Cardiovascular:      Rate and Rhythm: Normal rate and regular rhythm.      Heart sounds: Normal heart sounds. No murmur heard.      Pulmonary:      Effort:  Pulmonary effort is normal. No respiratory distress.      Breath sounds: Normal breath sounds. No wheezing.   Musculoskeletal:         General: Normal range of motion.      Cervical back: Normal range of motion and neck supple.   Lymphadenopathy:      Cervical: No cervical adenopathy.      Comments: Left cervical LAD.     Skin:     Capillary Refill: Capillary refill takes less than 2 seconds.   Neurological:      Mental Status: She is alert and oriented to person, place, and time.      Cranial Nerves: No cranial nerve deficit.   Psychiatric:         Mood and Affect: Affect is tearful.         Behavior: Behavior normal.         Thought Content: Thought content normal.         Judgment: Judgment normal.         Laboratory Reviewed ({Yes)  Lab Results   Component Value Date    WBC 5.62 11/30/2018    HGB 11.5 (L) 11/30/2018    HCT 38.3 11/30/2018     (L) 11/30/2018    CHOL 175 09/10/2018    TRIG 144 09/10/2018    HDL 29 (L) 09/10/2018    ALT 20 09/10/2018    AST 20 09/10/2018     11/19/2018    K 4.2 11/19/2018     11/19/2018    CREATININE 0.9 11/19/2018    BUN 26 (H) 11/19/2018    CO2 26 11/19/2018    TSH 1.758 09/15/2017    INR 1.1 09/10/2016    HGBA1C 5.4 02/19/2014       Dory was seen today for annual exam.    Diagnoses and all orders for this visit:    Annual physical exam  -     Lipid Panel; Future  -     Comprehensive Metabolic Panel; Future  -     CBC Auto Differential; Future    Screening mammogram, encounter for  -     Mammo Digital Screening Bilat w/ Isaiah; Future    Postmenopausal  -     DXA Bone Density Spine And Hip; Future    Body mass index (BMI) 40.0-44.9, adult    Major depressive disorder, single episode, moderate  -     CBC Auto Differential; Future  -     FLUoxetine 20 MG capsule; Take 1 capsule (20 mg total) by mouth once daily.    Abnormal finding of blood chemistry, unspecified   -     Lipid Panel; Future  -     CBC Auto Differential; Future    Encounter for screening for lipid  disorder  -     Lipid Panel; Future    Gastroesophageal reflux disease  -     pantoprazole (PROTONIX) 20 MG tablet; Take 1 tablet (20 mg total) by mouth once daily.    Seasonal allergies    Other orders  -     loratadine (CLARITIN) 10 mg tablet; Take 1 tablet (10 mg total) by mouth once daily.  -     azelastine (ASTELIN) 137 mcg (0.1 %) nasal spray; 1 spray (137 mcg total) by Nasal route 2 (two) times daily.      Recheck Cervical LAD 2-3 weeks if not better  Check with Dentist    Prozac 20 mg  Recheck 3 months            Care Plan/Goals: Reviewed   Goals      Blood Pressure < 140/90      LDL CHOLESTEROL < 130      Weight < 190 lb (86.183 kg)           Follow up: Follow up in about 6 months (around 7/26/2022).    After visit summary was printed and given to patient upon discharge today.  Patient goals and care plan are included in After Visit Summary.      Problem Visit    Depression 3 months  Death of sister  Tearful    A/P

## 2022-02-17 ENCOUNTER — HOSPITAL ENCOUNTER (OUTPATIENT)
Dept: RADIOLOGY | Facility: HOSPITAL | Age: 72
Discharge: HOME | End: 2022-02-17
Attending: FAMILY MEDICINE
Payer: MEDICARE

## 2022-02-17 VITALS — BODY MASS INDEX: 41.02 KG/M2 | HEIGHT: 63 IN | WEIGHT: 231.5 LBS

## 2022-02-17 DIAGNOSIS — Z12.31 SCREENING MAMMOGRAM, ENCOUNTER FOR: ICD-10-CM

## 2022-02-17 DIAGNOSIS — R59.1 LYMPHADENOPATHY: Primary | ICD-10-CM

## 2022-02-17 PROCEDURE — 77067 SCR MAMMO BI INCL CAD: CPT | Mod: TC

## 2022-02-17 PROCEDURE — 77067 MAMMO DIGITAL SCREENING BILAT WITH TOMO: ICD-10-PCS | Mod: 26,,, | Performed by: RADIOLOGY

## 2022-02-17 PROCEDURE — 77067 SCR MAMMO BI INCL CAD: CPT | Mod: 26,,, | Performed by: RADIOLOGY

## 2022-02-17 PROCEDURE — 77063 BREAST TOMOSYNTHESIS BI: CPT | Mod: TC

## 2022-02-17 PROCEDURE — 77063 BREAST TOMOSYNTHESIS BI: CPT | Mod: 26,,, | Performed by: RADIOLOGY

## 2022-02-17 PROCEDURE — 77063 MAMMO DIGITAL SCREENING BILAT WITH TOMO: ICD-10-PCS | Mod: 26,,, | Performed by: RADIOLOGY

## 2022-02-21 ENCOUNTER — TELEPHONE (OUTPATIENT)
Dept: INTERNAL MEDICINE | Facility: CLINIC | Age: 72
End: 2022-02-21
Payer: MEDICARE

## 2022-02-21 NOTE — TELEPHONE ENCOUNTER
----- Message from Makenna Davey MD sent at 2/21/2022  8:43 AM CST -----  CMP shows that her GFR is a little reduced on her kidney funciton  Meaning kidney function decline a little.  Make sure to hydrate with water and avoid NSAID like motrin and iburpofen.    Chronic anemia on CBC.    Lipids okay.  HDL or good remains low. It needs to come up a little.  Work on diet.

## 2022-02-25 ENCOUNTER — HOSPITAL ENCOUNTER (OUTPATIENT)
Dept: RADIOLOGY | Facility: HOSPITAL | Age: 72
Discharge: HOME | End: 2022-02-25
Attending: FAMILY MEDICINE
Payer: MEDICARE

## 2022-02-25 DIAGNOSIS — R59.1 LYMPHADENOPATHY: ICD-10-CM

## 2022-02-25 PROCEDURE — 76536 US SOFT TISSUE HEAD NECK THYROID: ICD-10-PCS | Mod: 26,,, | Performed by: RADIOLOGY

## 2022-02-25 PROCEDURE — 76536 US EXAM OF HEAD AND NECK: CPT | Mod: TC

## 2022-02-25 PROCEDURE — 76536 US EXAM OF HEAD AND NECK: CPT | Mod: 26,,, | Performed by: RADIOLOGY

## 2022-02-28 ENCOUNTER — TELEPHONE (OUTPATIENT)
Dept: INTERNAL MEDICINE | Facility: CLINIC | Age: 72
End: 2022-02-28
Payer: MEDICARE

## 2022-02-28 NOTE — TELEPHONE ENCOUNTER
Called and spoke with patient. Advised her that Dr. Davey is out of office. Her NP hasn't had the chance to review results but once she does we will give her a call. Patient verbalized understanding.

## 2022-02-28 NOTE — TELEPHONE ENCOUNTER
----- Message from Liset Sawant sent at 2/28/2022  9:03 AM CST -----  Contact: pt @[phone]  Calling to get test results.    Name of test (lab, x-ray): ULTRASOUND     Date of test: 02/25/2022    Where was the test performed: [facility], Radiology       Would you like a call back, or a response through your [facility] portal?:   call back    Comments:    Pt is requesting a call back with results.

## 2022-03-01 ENCOUNTER — TELEPHONE (OUTPATIENT)
Dept: INTERNAL MEDICINE | Facility: CLINIC | Age: 72
End: 2022-03-01
Payer: MEDICARE

## 2022-03-01 NOTE — TELEPHONE ENCOUNTER
Returned pt call.  Pt advises that they cannot access their mychart and was wondering if physician had reviewed imaging results.  Advised pt that she had not and as soona s we had result notes and recommendations we would reach out to ehr.  Pt verbalized understanding.

## 2022-03-07 ENCOUNTER — TELEPHONE (OUTPATIENT)
Dept: INTERNAL MEDICINE | Facility: CLINIC | Age: 72
End: 2022-03-07
Payer: MEDICARE

## 2022-03-07 DIAGNOSIS — R59.1 LYMPHADENOPATHY: Primary | ICD-10-CM

## 2022-03-07 NOTE — TELEPHONE ENCOUNTER
Spoke with patient regarding US results. Patient verbalized understanding.  She would like to see ENT

## 2022-03-07 NOTE — TELEPHONE ENCOUNTER
Called and spoke with patient. She said she went to the dentist. It wasn't anything on his side. She said she is still in pain. I told her I would send you a message so you can look at the US results. The dentist told her she may need to see ENT.

## 2022-03-09 ENCOUNTER — PATIENT OUTREACH (OUTPATIENT)
Dept: ADMINISTRATIVE | Facility: OTHER | Age: 72
End: 2022-03-09
Payer: MEDICARE

## 2022-03-09 NOTE — PROGRESS NOTES
Health Maintenance Due   Topic Date Due    TETANUS VACCINE  Never done    Shingles Vaccine (2 of 3) 09/12/2012    COVID-19 Vaccine (3 - Booster) 07/26/2021     Updates were requested from care everywhere.  Chart was reviewed for overdue Proactive [facility] Encounters ([facility]) topics (CRS, Breast Cancer Screening, Eye exam)  Health Maintenance has been updated.  LINKS immunization registry triggered.  Immunizations were reconciled.

## 2022-03-10 ENCOUNTER — OFFICE VISIT (OUTPATIENT)
Dept: OTOLARYNGOLOGY | Facility: CLINIC | Age: 72
End: 2022-03-10
Payer: MEDICARE

## 2022-03-10 VITALS
TEMPERATURE: 97 F | WEIGHT: 238.13 LBS | HEART RATE: 61 BPM | SYSTOLIC BLOOD PRESSURE: 135 MMHG | BODY MASS INDEX: 42.18 KG/M2 | DIASTOLIC BLOOD PRESSURE: 51 MMHG

## 2022-03-10 DIAGNOSIS — R59.1 LYMPHADENOPATHY: ICD-10-CM

## 2022-03-10 DIAGNOSIS — K11.8 PAROTID MASS: Primary | ICD-10-CM

## 2022-03-10 PROCEDURE — 1160F PR REVIEW ALL MEDS BY PRESCRIBER/CLIN PHARMACIST DOCUMENTED: ICD-10-PCS | Mod: CPTII,S$GLB,, | Performed by: OTOLARYNGOLOGY

## 2022-03-10 PROCEDURE — 99204 OFFICE O/P NEW MOD 45 MIN: CPT | Mod: S$GLB,,, | Performed by: OTOLARYNGOLOGY

## 2022-03-10 PROCEDURE — 99999 PR PBB SHADOW E&M-EST. PATIENT-LVL III: ICD-10-PCS | Mod: PBBFAC,,, | Performed by: OTOLARYNGOLOGY

## 2022-03-10 PROCEDURE — 99204 PR OFFICE/OUTPT VISIT, NEW, LEVL IV, 45-59 MIN: ICD-10-PCS | Mod: S$GLB,,, | Performed by: OTOLARYNGOLOGY

## 2022-03-10 PROCEDURE — 3078F PR MOST RECENT DIASTOLIC BLOOD PRESSURE < 80 MM HG: ICD-10-PCS | Mod: CPTII,S$GLB,, | Performed by: OTOLARYNGOLOGY

## 2022-03-10 PROCEDURE — 3075F PR MOST RECENT SYSTOLIC BLOOD PRESS GE 130-139MM HG: ICD-10-PCS | Mod: CPTII,S$GLB,, | Performed by: OTOLARYNGOLOGY

## 2022-03-10 PROCEDURE — 1159F PR MEDICATION LIST DOCUMENTED IN MEDICAL RECORD: ICD-10-PCS | Mod: CPTII,S$GLB,, | Performed by: OTOLARYNGOLOGY

## 2022-03-10 PROCEDURE — 3008F PR BODY MASS INDEX (BMI) DOCUMENTED: ICD-10-PCS | Mod: CPTII,S$GLB,, | Performed by: OTOLARYNGOLOGY

## 2022-03-10 PROCEDURE — 99999 PR PBB SHADOW E&M-EST. PATIENT-LVL III: CPT | Mod: PBBFAC,,, | Performed by: OTOLARYNGOLOGY

## 2022-03-10 PROCEDURE — 1159F MED LIST DOCD IN RCRD: CPT | Mod: CPTII,S$GLB,, | Performed by: OTOLARYNGOLOGY

## 2022-03-10 PROCEDURE — 3078F DIAST BP <80 MM HG: CPT | Mod: CPTII,S$GLB,, | Performed by: OTOLARYNGOLOGY

## 2022-03-10 PROCEDURE — 3075F SYST BP GE 130 - 139MM HG: CPT | Mod: CPTII,S$GLB,, | Performed by: OTOLARYNGOLOGY

## 2022-03-10 PROCEDURE — 1160F RVW MEDS BY RX/DR IN RCRD: CPT | Mod: CPTII,S$GLB,, | Performed by: OTOLARYNGOLOGY

## 2022-03-10 PROCEDURE — 3008F BODY MASS INDEX DOCD: CPT | Mod: CPTII,S$GLB,, | Performed by: OTOLARYNGOLOGY

## 2022-03-10 RX ORDER — AMOXICILLIN 875 MG/1
875 TABLET, FILM COATED ORAL 2 TIMES DAILY
Qty: 20 TABLET | Refills: 0 | Status: SHIPPED | OUTPATIENT
Start: 2022-03-10 | End: 2022-03-20

## 2022-03-10 NOTE — PROGRESS NOTES
Referring Provider:    Makenna Davey Md  [address] [place] [address]  Subjective:   Patient: Dory Xie [id number], :1950   Visit date:3/10/2022 11:29 AM    Chief Complaint:  Other (Lymph node)    HPI:    Prior notes reviewed by myself.  Clinical documentation obtained by nursing staff reviewed.     72-year-old female referred by Dr. Davey for evaluation of a left enlarged cervical lymph node.  The patient reports that she has had 2 lymph nodes in her left preauricular area for several months.  She feels like they may be enlarged slightly.  She does have some pain associated with these lymph nodes.  She denies any other new lumps or bumps, unintentional weight loss, night sweats      Objective:     Physical Exam:  Vitals:  BP (!) 135/51   Pulse 61   Temp 97 °F (36.1 °C) (Temporal)   Wt 108 kg (238 lb 1.6 oz)   BMI 42.18 kg/m²   General appearance:  Well developed, well nourished    Ears:  Otoscopy of external auditory canals and tympanic membranes was normal, clinical speech reception thresholds grossly intact, no mass/lesion of auricle.    Nose:  No masses/lesions of external nose, nasal mucosa, septum, and turbinates were within normal limits.    Mouth:  No mass/lesion of lips, teeth, gums, hard/soft palate, tongue, tonsils, or oropharynx.    Neck & Lymphatics:  No cervical lymphadenopathy, no neck mass/crepitus/ asymmetry, trachea is midline, palpable mass left tail of parotid, TTP.  Parotid mass vs. Lymph node    Neuro:  CN II-XII intact bilaterally    [x]  Data Reviewed:    Lab Results   Component Value Date    WBC 4.58 2022    HGB 11.0 (L) 2022    HCT 36.3 (L) 2022    MCV 92 2022    EOSINOPHIL 2.6 2022         [x]  Independent interpretation of test:  US Soft Tissue Head Neck Thyroid  Order: [id number]   Status: Final result     Visible to patient: Yes (not seen)     Next appt: 2022 at 11:00 AM in Internal Medicine (aMkenna Davey,  MD)     Dx: Lymphadenopathy     2 Result Notes     1 Patient Communication    Details    Reading Physician Reading Date Result Priority   Dejan Alcantara MD  [phone] 2/25/2022 Routine     Narrative & Impression  EXAMINATION:  US SOFT TISSUE HEAD NECK THYROID     CLINICAL HISTORY:  Generalized enlarged lymph nodes     TECHNIQUE:  Ultrasound of the soft tissues of the neck performed at the area of clinical concern.     COMPARISON:  None.     FINDINGS:  There are multiple nonspecific prominent subcentimeter short axis lymph nodes present the left neck inferior to the ear and near the parotid gland.  The largest nodes measure 19 x 7 x 18 mm and 24 x 7 x 12 mm.  Normal fatty kenneth appear preserved.     Impression:     Nonspecific left-sided cervical lymph nodes as above, potentially reactive in nature.  Contrast enhanced CT of the soft tissues of the neck may be useful for further evaluation as clinically warranted.        Electronically signed by: Dejan Alcantara MD  Date:                                            02/25/2022  Time:                                           16:00             Exam Ended: 02/25/22 14:31 Last Resulted: 02/25/22 16:00                     Assessment & Plan:   Parotid mass  -     CT Soft Tissue Neck With Contrast; Future; Expected date: 03/10/2022    Lymphadenopathy  -     Ambulatory referral/consult to ENT  -     amoxicillin (AMOXIL) 875 MG tablet; Take 1 tablet (875 mg total) by mouth 2 (two) times daily. for 10 days  Dispense: 20 tablet; Refill: 0      She had a recent u/s which demonstrated a 19mm and a 24 mm lymph node in the area of her tail of parotid.  Today these are tender to palpation.  It is suspicious that these may be within the parotid gland itself given their location.  We agreed to treat her with another round of antibiotics given the tenderness, but I also recommended a CT neck for further detail.    RTC 2 weeks.

## 2022-03-11 ENCOUNTER — HOSPITAL ENCOUNTER (OUTPATIENT)
Dept: RADIOLOGY | Facility: HOSPITAL | Age: 72
Discharge: HOME | End: 2022-03-11
Attending: OTOLARYNGOLOGY
Payer: MEDICARE

## 2022-03-11 DIAGNOSIS — K11.8 PAROTID MASS: ICD-10-CM

## 2022-03-11 PROCEDURE — 25500020 PHARM REV CODE 255: Performed by: OTOLARYNGOLOGY

## 2022-03-11 PROCEDURE — 70491 CT SOFT TISSUE NECK W/DYE: CPT | Mod: TC

## 2022-03-11 RX ADMIN — IOHEXOL 100 ML: 350 INJECTION, SOLUTION INTRAVENOUS at 10:03

## 2022-03-29 ENCOUNTER — OFFICE VISIT (OUTPATIENT)
Dept: OTOLARYNGOLOGY | Facility: CLINIC | Age: 72
End: 2022-03-29
Payer: MEDICARE

## 2022-03-29 VITALS
BODY MASS INDEX: 41.47 KG/M2 | SYSTOLIC BLOOD PRESSURE: 142 MMHG | DIASTOLIC BLOOD PRESSURE: 65 MMHG | WEIGHT: 234.13 LBS | TEMPERATURE: 98 F

## 2022-03-29 DIAGNOSIS — K11.8 PAROTID MASS: ICD-10-CM

## 2022-03-29 DIAGNOSIS — R59.1 LYMPHADENOPATHY: Primary | ICD-10-CM

## 2022-03-29 PROCEDURE — 1160F PR REVIEW ALL MEDS BY PRESCRIBER/CLIN PHARMACIST DOCUMENTED: ICD-10-PCS | Mod: CPTII,S$GLB,, | Performed by: OTOLARYNGOLOGY

## 2022-03-29 PROCEDURE — 3008F PR BODY MASS INDEX (BMI) DOCUMENTED: ICD-10-PCS | Mod: CPTII,S$GLB,, | Performed by: OTOLARYNGOLOGY

## 2022-03-29 PROCEDURE — 99214 OFFICE O/P EST MOD 30 MIN: CPT | Mod: S$GLB,,, | Performed by: OTOLARYNGOLOGY

## 2022-03-29 PROCEDURE — 1101F PT FALLS ASSESS-DOCD LE1/YR: CPT | Mod: CPTII,S$GLB,, | Performed by: OTOLARYNGOLOGY

## 2022-03-29 PROCEDURE — 3078F DIAST BP <80 MM HG: CPT | Mod: CPTII,S$GLB,, | Performed by: OTOLARYNGOLOGY

## 2022-03-29 PROCEDURE — 99214 PR OFFICE/OUTPT VISIT, EST, LEVL IV, 30-39 MIN: ICD-10-PCS | Mod: S$GLB,,, | Performed by: OTOLARYNGOLOGY

## 2022-03-29 PROCEDURE — 3288F FALL RISK ASSESSMENT DOCD: CPT | Mod: CPTII,S$GLB,, | Performed by: OTOLARYNGOLOGY

## 2022-03-29 PROCEDURE — 99999 PR PBB SHADOW E&M-EST. PATIENT-LVL III: ICD-10-PCS | Mod: PBBFAC,,, | Performed by: OTOLARYNGOLOGY

## 2022-03-29 PROCEDURE — 1126F PR PAIN SEVERITY QUANTIFIED, NO PAIN PRESENT: ICD-10-PCS | Mod: CPTII,S$GLB,, | Performed by: OTOLARYNGOLOGY

## 2022-03-29 PROCEDURE — 3078F PR MOST RECENT DIASTOLIC BLOOD PRESSURE < 80 MM HG: ICD-10-PCS | Mod: CPTII,S$GLB,, | Performed by: OTOLARYNGOLOGY

## 2022-03-29 PROCEDURE — 1126F AMNT PAIN NOTED NONE PRSNT: CPT | Mod: CPTII,S$GLB,, | Performed by: OTOLARYNGOLOGY

## 2022-03-29 PROCEDURE — 1160F RVW MEDS BY RX/DR IN RCRD: CPT | Mod: CPTII,S$GLB,, | Performed by: OTOLARYNGOLOGY

## 2022-03-29 PROCEDURE — 99999 PR PBB SHADOW E&M-EST. PATIENT-LVL III: CPT | Mod: PBBFAC,,, | Performed by: OTOLARYNGOLOGY

## 2022-03-29 PROCEDURE — 3288F PR FALLS RISK ASSESSMENT DOCUMENTED: ICD-10-PCS | Mod: CPTII,S$GLB,, | Performed by: OTOLARYNGOLOGY

## 2022-03-29 PROCEDURE — 3008F BODY MASS INDEX DOCD: CPT | Mod: CPTII,S$GLB,, | Performed by: OTOLARYNGOLOGY

## 2022-03-29 PROCEDURE — 1159F MED LIST DOCD IN RCRD: CPT | Mod: CPTII,S$GLB,, | Performed by: OTOLARYNGOLOGY

## 2022-03-29 PROCEDURE — 3077F PR MOST RECENT SYSTOLIC BLOOD PRESSURE >= 140 MM HG: ICD-10-PCS | Mod: CPTII,S$GLB,, | Performed by: OTOLARYNGOLOGY

## 2022-03-29 PROCEDURE — 1159F PR MEDICATION LIST DOCUMENTED IN MEDICAL RECORD: ICD-10-PCS | Mod: CPTII,S$GLB,, | Performed by: OTOLARYNGOLOGY

## 2022-03-29 PROCEDURE — 1101F PR PT FALLS ASSESS DOC 0-1 FALLS W/OUT INJ PAST YR: ICD-10-PCS | Mod: CPTII,S$GLB,, | Performed by: OTOLARYNGOLOGY

## 2022-03-29 PROCEDURE — 3077F SYST BP >= 140 MM HG: CPT | Mod: CPTII,S$GLB,, | Performed by: OTOLARYNGOLOGY

## 2022-03-29 NOTE — PROGRESS NOTES
Referring Provider:    No referring provider defined for this encounter.  Subjective:   Patient: Dory Xie [id number], :1950   Visit date:3/29/2022 11:29 AM    Chief Complaint:  Follow-up (Recheck on parotid glands after antibiotics)    HPI:    Prior notes reviewed by myself.  Clinical documentation obtained by nursing staff reviewed.     72-year-old female referred by Dr. Davey for evaluation of a left enlarged cervical lymph node.  The patient reports that she has had 2 lymph nodes in her left preauricular area for several months.  She feels like they may be enlarged slightly.  She does have some pain associated with these lymph nodes.  She denies any other new lumps or bumps, unintentional weight loss, night sweats    3/29/22 update:  Feels that swelling has completely resolved      Objective:     Physical Exam:  Vitals:  BP (!) 142/65   Temp 97.5 °F (36.4 °C) (Temporal)   Wt 106.2 kg (234 lb 2.1 oz)   BMI 41.47 kg/m²   General appearance:  Well developed, well nourished    Ears:  Otoscopy of external auditory canals and tympanic membranes was normal, clinical speech reception thresholds grossly intact, no mass/lesion of auricle.    Nose:  No masses/lesions of external nose, nasal mucosa, septum, and turbinates were within normal limits.    Mouth:  No mass/lesion of lips, teeth, gums, hard/soft palate, tongue, tonsils, or oropharynx.    Neck & Lymphatics:  No cervical lymphadenopathy, no neck mass/crepitus/ asymmetry, trachea is midline, very slight asymmetry of parotid gland on the left, no discrete mass    Neuro:  CN II-XII intact bilaterally    [x]  Data Reviewed:    Lab Results   Component Value Date    WBC 4.58 2022    HGB 11.0 (L) 2022    HCT 36.3 (L) 2022    MCV 92 2022    EOSINOPHIL 2.6 2022         [x]  Independent interpretation of test:  US Soft Tissue Head Neck Thyroid  Order: [id number]   Status: Final result     Visible to patient: Yes (not seen)     Next  appt: 04/28/2022 at 11:00 AM in Internal Medicine (Makenna Davey MD)     Dx: Lymphadenopathy     2 Result Notes     1 Patient Communication    Details    Reading Physician Reading Date Result Priority   Dejan Alcantara MD  [phone] 2/25/2022 Routine     Narrative & Impression  EXAMINATION:  US SOFT TISSUE HEAD NECK THYROID     CLINICAL HISTORY:  Generalized enlarged lymph nodes     TECHNIQUE:  Ultrasound of the soft tissues of the neck performed at the area of clinical concern.     COMPARISON:  None.     FINDINGS:  There are multiple nonspecific prominent subcentimeter short axis lymph nodes present the left neck inferior to the ear and near the parotid gland.  The largest nodes measure 19 x 7 x 18 mm and 24 x 7 x 12 mm.  Normal fatty kenneth appear preserved.     Impression:     Nonspecific left-sided cervical lymph nodes as above, potentially reactive in nature.  Contrast enhanced CT of the soft tissues of the neck may be useful for further evaluation as clinically warranted.        Electronically signed by: Dejan Alcantara MD  Date:                                            02/25/2022  Time:                                           16:00             Exam Ended: 02/25/22 14:31 Last Resulted: 02/25/22 16:00           CT Soft Tissue Neck With Contrast  Order: [id number]   Status: Final result     Visible to patient: Yes (not seen)     Next appt: 04/28/2022 at 11:00 AM in Internal Medicine (Makenna Davey MD)     Dx: Parotid mass     1 Result Note     1 Patient Communication    Details    Reading Physician Reading Date Result Priority   Chicho Guerrier MD  [phone] 3/11/2022 Routine     Narrative & Impression  EXAMINATION:  CT SOFT TISSUE NECK WITH CONTRAST     CLINICAL HISTORY:  Other diseases of salivary glands, left parotid mass     TECHNIQUE:  CT of the neck soft tissues performed with 100 cc Omnipaque 350 all CT scans at this facility are performed  using dose modulation  techniques as appropriate to performed exam including the following:  automated exposure control; adjustment of mA and/or kV according to the patients size (this includes techniques or standardized protocols for targeted exams where dose is matched to indication/reason for exam: i.e. extremities or head);  iterative reconstruction technique.     COMPARISON:  Ultrasound 02/25/2022.     FINDINGS:  There appear to be 4 or 5 separate ovoid and or nodular soft tissue masses of the left parotid gland.  The largest is 11.5 x 16.7 mm.  The 2nd largest is approximately 11.3 x 16.2 mm.  These appear to be intraparotid or periparotid in location.     The right parotid gland appears grossly normal.     The submandibular glands appear symmetric.     There are several small scattered internal jugular chain and posterior cervical triangle lymph nodes     The thyroid gland region is grossly normal.     The carotid vasculature is medially deviated and reaching near the midline at the level of the hypopharynx.     The larynx appears normal.     Cervical degenerative disc disease with grade 1 C3-4 spondylolisthesis.     Impression:     Multiple round and or ovoid nodular enhancing lesions of the left parotid gland most consistent with intraparotid/periparotid lymph nodes.        Electronically signed by: Chicho Guerrier MD  Date:                                            03/11/2022  Time:                                           10:51               Assessment & Plan:   Lymphadenopathy    Parotid mass      She had a recent u/s which demonstrated a 19mm and a 24 mm lymph node in the area of her tail of parotid.  Today these are tender to palpation.  It is suspicious that these may be within the parotid gland itself given their location.  We agreed to treat her with another round of antibiotics given the tenderness, but I also recommended a CT neck for further detail.    RTC 2 weeks.    3/29/22 update:  She has very mild asymmetry in  her left parotid gland compared to her right.  The discrete palpable swellings noticed at the last visit seem to have resolved.  She does not have any significant tenderness.  We reviewed her CT results which shows some inflammation in that gland along with 2 enlarged lymph nodes/masses.  Clinically this seems to have resolved with antibiotics.  We agreed to follow-up again in 6 weeks and possibly repeat an ultrasound at that time.  She agrees with and understands the plan.

## 2022-04-28 ENCOUNTER — OFFICE VISIT (OUTPATIENT)
Dept: INTERNAL MEDICINE | Facility: CLINIC | Age: 72
End: 2022-04-28
Payer: MEDICARE

## 2022-04-28 VITALS
SYSTOLIC BLOOD PRESSURE: 110 MMHG | HEIGHT: 63 IN | BODY MASS INDEX: 40.05 KG/M2 | WEIGHT: 226.06 LBS | DIASTOLIC BLOOD PRESSURE: 74 MMHG | TEMPERATURE: 98 F | OXYGEN SATURATION: 97 % | HEART RATE: 110 BPM

## 2022-04-28 DIAGNOSIS — R59.1 LYMPHADENOPATHY: Primary | ICD-10-CM

## 2022-04-28 PROCEDURE — 99999 PR PBB SHADOW E&M-EST. PATIENT-LVL III: ICD-10-PCS | Mod: PBBFAC,,, | Performed by: FAMILY MEDICINE

## 2022-04-28 PROCEDURE — 3288F FALL RISK ASSESSMENT DOCD: CPT | Mod: CPTII,S$GLB,, | Performed by: FAMILY MEDICINE

## 2022-04-28 PROCEDURE — 1101F PR PT FALLS ASSESS DOC 0-1 FALLS W/OUT INJ PAST YR: ICD-10-PCS | Mod: CPTII,S$GLB,, | Performed by: FAMILY MEDICINE

## 2022-04-28 PROCEDURE — 3078F DIAST BP <80 MM HG: CPT | Mod: CPTII,S$GLB,, | Performed by: FAMILY MEDICINE

## 2022-04-28 PROCEDURE — 3074F PR MOST RECENT SYSTOLIC BLOOD PRESSURE < 130 MM HG: ICD-10-PCS | Mod: CPTII,S$GLB,, | Performed by: FAMILY MEDICINE

## 2022-04-28 PROCEDURE — 1159F PR MEDICATION LIST DOCUMENTED IN MEDICAL RECORD: ICD-10-PCS | Mod: CPTII,S$GLB,, | Performed by: FAMILY MEDICINE

## 2022-04-28 PROCEDURE — 99214 OFFICE O/P EST MOD 30 MIN: CPT | Mod: S$GLB,,, | Performed by: FAMILY MEDICINE

## 2022-04-28 PROCEDURE — 1160F PR REVIEW ALL MEDS BY PRESCRIBER/CLIN PHARMACIST DOCUMENTED: ICD-10-PCS | Mod: CPTII,S$GLB,, | Performed by: FAMILY MEDICINE

## 2022-04-28 PROCEDURE — 1126F PR PAIN SEVERITY QUANTIFIED, NO PAIN PRESENT: ICD-10-PCS | Mod: CPTII,S$GLB,, | Performed by: FAMILY MEDICINE

## 2022-04-28 PROCEDURE — 1159F MED LIST DOCD IN RCRD: CPT | Mod: CPTII,S$GLB,, | Performed by: FAMILY MEDICINE

## 2022-04-28 PROCEDURE — 3074F SYST BP LT 130 MM HG: CPT | Mod: CPTII,S$GLB,, | Performed by: FAMILY MEDICINE

## 2022-04-28 PROCEDURE — 1101F PT FALLS ASSESS-DOCD LE1/YR: CPT | Mod: CPTII,S$GLB,, | Performed by: FAMILY MEDICINE

## 2022-04-28 PROCEDURE — 3078F PR MOST RECENT DIASTOLIC BLOOD PRESSURE < 80 MM HG: ICD-10-PCS | Mod: CPTII,S$GLB,, | Performed by: FAMILY MEDICINE

## 2022-04-28 PROCEDURE — 3008F BODY MASS INDEX DOCD: CPT | Mod: CPTII,S$GLB,, | Performed by: FAMILY MEDICINE

## 2022-04-28 PROCEDURE — 99214 PR OFFICE/OUTPT VISIT, EST, LEVL IV, 30-39 MIN: ICD-10-PCS | Mod: S$GLB,,, | Performed by: FAMILY MEDICINE

## 2022-04-28 PROCEDURE — 99999 PR PBB SHADOW E&M-EST. PATIENT-LVL III: CPT | Mod: PBBFAC,,, | Performed by: FAMILY MEDICINE

## 2022-04-28 PROCEDURE — 3288F PR FALLS RISK ASSESSMENT DOCUMENTED: ICD-10-PCS | Mod: CPTII,S$GLB,, | Performed by: FAMILY MEDICINE

## 2022-04-28 PROCEDURE — 1160F RVW MEDS BY RX/DR IN RCRD: CPT | Mod: CPTII,S$GLB,, | Performed by: FAMILY MEDICINE

## 2022-04-28 PROCEDURE — 1126F AMNT PAIN NOTED NONE PRSNT: CPT | Mod: CPTII,S$GLB,, | Performed by: FAMILY MEDICINE

## 2022-04-28 PROCEDURE — 3008F PR BODY MASS INDEX (BMI) DOCUMENTED: ICD-10-PCS | Mod: CPTII,S$GLB,, | Performed by: FAMILY MEDICINE

## 2022-04-28 RX ORDER — AZELASTINE 1 MG/ML
1 SPRAY, METERED NASAL 2 TIMES DAILY
COMMUNITY

## 2022-04-28 NOTE — PROGRESS NOTES
Dory Xie  04/28/2022  [id number]    Makenna Davey MD  Patient Care Team:  Makenna Davey MD as PCP - General (Family Medicine)  eKly Salazar MD as Consulting Physician (Physical Medicine and Rehabilitation)  Iliana Benoit LPN as Care Coordinator (Internal Medicine)  Sorin Mcmanus MD as Consulting Physician (Ophthalmology)          Visit Type:a scheduled routine follow-up visit    Chief Complaint:  Chief Complaint   Patient presents with    Follow-up     3 month f/u       History of Present Illness:  Here for 3 month follow up  Following up on the Lymph nodes  Saw ENT  Did CT  Seems to be from parotid gland    Tx with Amoxil  She has been drinking water, sucking on candy  She reports she feels more on her left side.     She saw some improvement.     She is on Prozac  She feels mood is better  Less crying  Son is in nursing home.      History:  Past Medical History:   Diagnosis Date    Arthritis     Cancer     cervical    Chronic pain     From osteoarthritis    Colitis     GERD (gastroesophageal reflux disease)     Seasonal allergies      Past Surgical History:   Procedure Laterality Date    COLONOSCOPY N/A 9/21/2016    Procedure: COLONOSCOPY;  Surgeon: Hector Summers III, MD;  Location: [facility];  Service: Endoscopy;  Laterality: N/A;    EYE SURGERY      HYSTERECTOMY      29yrs old    KNEE SURGERY Left     KNEE SURGERY Right     TONSILLECTOMY      TOTAL KNEE ARTHROPLASTY Bilateral 2019    TUBAL LIGATION       Family History   Problem Relation Age of Onset    Diabetes Mother     Heart disease Mother     Cancer Mother         lung    Cancer Father         Lung    Hyperlipidemia Father     Hypertension Unknown     Breast cancer Sister      Social History     Socioeconomic History    Marital status: Single   Tobacco Use    Smoking status: Never Smoker    Smokeless tobacco: Never Used   Substance and Sexual Activity    Alcohol use: No    Drug use: No     Patient  Active Problem List   Diagnosis    Primary osteoarthritis of both knees    Chronic pain    Diet-controlled hyperlipidemia    Seasonal allergies    Osteoporosis    BMI of 40.0-44.9, adult    Family history of arteriosclerotic cardiovascular disease    Colitis    Vertigo     Review of patient's allergies indicates:  No Known Allergies    The following were reviewed at this visit: active problem list, medication list, allergies, family history, social history, and health maintenance.    Medications:  Current Outpatient Medications on File Prior to Visit   Medication Sig Dispense Refill    azelastine (ASTELIN) 137 mcg (0.1 %) nasal spray 1 spray by Nasal route 2 (two) times daily.      FLUoxetine 20 MG capsule Take 1 capsule (20 mg total) by mouth once daily. 30 capsule 11    fluticasone propionate (FLONASE) 50 mcg/actuation nasal spray 2 sprays (100 mcg total) by Each Nostril route once daily. 16 g 4    loratadine (CLARITIN) 10 mg tablet Take 1 tablet (10 mg total) by mouth once daily. 30 tablet 11    pantoprazole (PROTONIX) 20 MG tablet Take 1 tablet (20 mg total) by mouth once daily. 90 tablet 3    potassium gluconate 595 (99) mg Tab Take 1 tablet by mouth Daily.       No current facility-administered medications on file prior to visit.       Medications have been reviewed and reconciled with patient at this visit.  Barriers to medications reviewed with patient.    Adverse reactions to current medications reviewed with patient..    Over the counter medications reviewed and reconciled with patient.    Exam:  Wt Readings from Last 3 Encounters:   04/28/22 102.6 kg (226 lb 1.3 oz)   03/29/22 106.2 kg (234 lb 2.1 oz)   03/10/22 108 kg (238 lb 1.6 oz)     Temp Readings from Last 3 Encounters:   04/28/22 97.9 °F (36.6 °C) (Temporal)   03/29/22 97.5 °F (36.4 °C) (Temporal)   03/10/22 97 °F (36.1 °C) (Temporal)     BP Readings from Last 3 Encounters:   04/28/22 110/74   03/29/22 (!) 142/65   03/10/22 (!)  135/51     Pulse Readings from Last 3 Encounters:   04/28/22 110   03/10/22 61   01/26/22 103     Body mass index is 40.05 kg/m².      Review of Systems   Constitutional: Negative.  Negative for chills and fever.   HENT: Negative.  Negative for congestion, sinus pain and sore throat.         Facial swelling left side   Eyes: Negative for blurred vision and double vision.   Respiratory: Negative for cough, sputum production, shortness of breath and wheezing.    Cardiovascular: Negative for chest pain, palpitations and leg swelling.   Gastrointestinal: Negative for abdominal pain, constipation, diarrhea, heartburn, nausea and vomiting.   Genitourinary: Negative.    Musculoskeletal: Negative.    Skin: Negative.  Negative for rash.   Neurological: Negative.    Endo/Heme/Allergies: Negative.  Negative for polydipsia. Does not bruise/bleed easily.   Psychiatric/Behavioral: Negative for depression and substance abuse.     Physical Exam  Nursing note reviewed.   Pulmonary:      Effort: Pulmonary effort is normal. No respiratory distress.   Neurological:      Mental Status: She is alert and oriented to person, place, and time.   Psychiatric:         Mood and Affect: Mood normal.         Behavior: Behavior normal.         Thought Content: Thought content normal.         Judgment: Judgment normal.         Laboratory Reviewed ({Yes)  Lab Results   Component Value Date    WBC 4.58 02/17/2022    HGB 11.0 (L) 02/17/2022    HCT 36.3 (L) 02/17/2022    PLT 59 (L) 02/17/2022    CHOL 184 02/17/2022    TRIG 137 02/17/2022    HDL 32 (L) 02/17/2022    ALT 10 02/17/2022    AST 13 02/17/2022     02/17/2022    K 4.5 02/17/2022     02/17/2022    CREATININE 1.1 02/17/2022    BUN 23 02/17/2022    CO2 23 02/17/2022    TSH 1.758 09/15/2017    INR 1.1 09/10/2016    HGBA1C 5.4 02/19/2014       Dory was seen today for follow-up.    Diagnoses and all orders for this visit:    Lymphadenopathy  -     US Soft Tissue Head Neck Thyroid;  Future    Will repeat US since with swelling  Will follow up with ENT    Prozac- has helped.                Care Plan/Goals: Reviewed    Goals      Blood Pressure < 140/90      LDL CHOLESTEROL < 130      Weight < 190 lb (86.183 kg)           Follow up: No follow-ups on file.    After visit summary was printed and given to patient upon discharge today.  Patient goals and care plan are included in After Visit Summary.

## 2022-05-13 ENCOUNTER — HOSPITAL ENCOUNTER (OUTPATIENT)
Dept: RADIOLOGY | Facility: HOSPITAL | Age: 72
Discharge: HOME | End: 2022-05-13
Attending: FAMILY MEDICINE
Payer: MEDICARE

## 2022-05-13 DIAGNOSIS — R59.1 LYMPHADENOPATHY: ICD-10-CM

## 2022-05-13 PROCEDURE — 76536 US EXAM OF HEAD AND NECK: CPT | Mod: TC

## 2022-05-13 PROCEDURE — 76536 US EXAM OF HEAD AND NECK: CPT | Mod: 26,,, | Performed by: RADIOLOGY

## 2022-05-13 PROCEDURE — 76536 US SOFT TISSUE HEAD NECK THYROID: ICD-10-PCS | Mod: 26,,, | Performed by: RADIOLOGY

## 2022-05-17 ENCOUNTER — OFFICE VISIT (OUTPATIENT)
Dept: OTOLARYNGOLOGY | Facility: CLINIC | Age: 72
End: 2022-05-17
Payer: MEDICARE

## 2022-05-17 VITALS — WEIGHT: 232.38 LBS | TEMPERATURE: 98 F | BODY MASS INDEX: 41.16 KG/M2

## 2022-05-17 DIAGNOSIS — R59.1 LYMPHADENOPATHY: Primary | ICD-10-CM

## 2022-05-17 PROCEDURE — 3288F PR FALLS RISK ASSESSMENT DOCUMENTED: ICD-10-PCS | Mod: CPTII,S$GLB,, | Performed by: OTOLARYNGOLOGY

## 2022-05-17 PROCEDURE — 99213 PR OFFICE/OUTPT VISIT, EST, LEVL III, 20-29 MIN: ICD-10-PCS | Mod: S$GLB,,, | Performed by: OTOLARYNGOLOGY

## 2022-05-17 PROCEDURE — 1101F PR PT FALLS ASSESS DOC 0-1 FALLS W/OUT INJ PAST YR: ICD-10-PCS | Mod: CPTII,S$GLB,, | Performed by: OTOLARYNGOLOGY

## 2022-05-17 PROCEDURE — 1101F PT FALLS ASSESS-DOCD LE1/YR: CPT | Mod: CPTII,S$GLB,, | Performed by: OTOLARYNGOLOGY

## 2022-05-17 PROCEDURE — 1126F AMNT PAIN NOTED NONE PRSNT: CPT | Mod: CPTII,S$GLB,, | Performed by: OTOLARYNGOLOGY

## 2022-05-17 PROCEDURE — 1159F PR MEDICATION LIST DOCUMENTED IN MEDICAL RECORD: ICD-10-PCS | Mod: CPTII,S$GLB,, | Performed by: OTOLARYNGOLOGY

## 2022-05-17 PROCEDURE — 3008F BODY MASS INDEX DOCD: CPT | Mod: CPTII,S$GLB,, | Performed by: OTOLARYNGOLOGY

## 2022-05-17 PROCEDURE — 99213 OFFICE O/P EST LOW 20 MIN: CPT | Mod: S$GLB,,, | Performed by: OTOLARYNGOLOGY

## 2022-05-17 PROCEDURE — 99999 PR PBB SHADOW E&M-EST. PATIENT-LVL II: CPT | Mod: PBBFAC,,, | Performed by: OTOLARYNGOLOGY

## 2022-05-17 PROCEDURE — 99999 PR PBB SHADOW E&M-EST. PATIENT-LVL II: ICD-10-PCS | Mod: PBBFAC,,, | Performed by: OTOLARYNGOLOGY

## 2022-05-17 PROCEDURE — 3288F FALL RISK ASSESSMENT DOCD: CPT | Mod: CPTII,S$GLB,, | Performed by: OTOLARYNGOLOGY

## 2022-05-17 PROCEDURE — 1159F MED LIST DOCD IN RCRD: CPT | Mod: CPTII,S$GLB,, | Performed by: OTOLARYNGOLOGY

## 2022-05-17 PROCEDURE — 3008F PR BODY MASS INDEX (BMI) DOCUMENTED: ICD-10-PCS | Mod: CPTII,S$GLB,, | Performed by: OTOLARYNGOLOGY

## 2022-05-17 PROCEDURE — 1126F PR PAIN SEVERITY QUANTIFIED, NO PAIN PRESENT: ICD-10-PCS | Mod: CPTII,S$GLB,, | Performed by: OTOLARYNGOLOGY

## 2022-05-17 NOTE — PROGRESS NOTES
Referring Provider:    No referring provider defined for this encounter.  Subjective:   Patient: Dory Xie [id number], :1950   Visit date:2022 11:29 AM    Chief Complaint:  f/u on lymp nodes (Pt states there is no pain)    HPI:    Prior notes reviewed by myself.  Clinical documentation obtained by nursing staff reviewed.     72-year-old female referred by Dr. Davey for evaluation of a left enlarged cervical lymph node.  The patient reports that she has had 2 lymph nodes in her left preauricular area for several months.  She feels like they may be enlarged slightly.  She does have some pain associated with these lymph nodes.  She denies any other new lumps or bumps, unintentional weight loss, night sweats    3/29/22 update:  Feels that swelling has completely resolved     update:  Feels swelling has resolved, had a repeat u/s late April.      Objective:     Physical Exam:  Vitals:  Temp 97.5 °F (36.4 °C) (Temporal)   Wt 105.4 kg (232 lb 5.8 oz)   BMI 41.16 kg/m²   General appearance:  Well developed, well nourished    Ears:  Otoscopy of external auditory canals and tympanic membranes was normal, clinical speech reception thresholds grossly intact, no mass/lesion of auricle.    Nose:  No masses/lesions of external nose, nasal mucosa, septum, and turbinates were within normal limits.    Mouth:  No mass/lesion of lips, teeth, gums, hard/soft palate, tongue, tonsils, or oropharynx.    Neck & Lymphatics:  No cervical lymphadenopathy, no neck mass/crepitus/ asymmetry, trachea is midline, very slight asymmetry of parotid gland on the left, no discrete mass    Neuro:  CN II-XII intact bilaterally    [x]  Data Reviewed:    Lab Results   Component Value Date    WBC 4.58 2022    HGB 11.0 (L) 2022    HCT 36.3 (L) 2022    MCV 92 2022    EOSINOPHIL 2.6 2022         [x]  Independent interpretation of test:  US Soft Tissue Head Neck Thyroid  Order: [id number]   Status: Final  result     Visible to patient: Yes (not seen)     Next appt: 04/28/2022 at 11:00 AM in Internal Medicine (Makenna Davey MD)     Dx: Lymphadenopathy     2 Result Notes     1 Patient Communication    Details    Reading Physician Reading Date Result Priority   Dejan Alcantara MD  [phone] 2/25/2022 Routine     Narrative & Impression  EXAMINATION:  US SOFT TISSUE HEAD NECK THYROID     CLINICAL HISTORY:  Generalized enlarged lymph nodes     TECHNIQUE:  Ultrasound of the soft tissues of the neck performed at the area of clinical concern.     COMPARISON:  None.     FINDINGS:  There are multiple nonspecific prominent subcentimeter short axis lymph nodes present the left neck inferior to the ear and near the parotid gland.  The largest nodes measure 19 x 7 x 18 mm and 24 x 7 x 12 mm.  Normal fatty kenneth appear preserved.     Impression:     Nonspecific left-sided cervical lymph nodes as above, potentially reactive in nature.  Contrast enhanced CT of the soft tissues of the neck may be useful for further evaluation as clinically warranted.        Electronically signed by: Dejan Alcantara MD  Date:                                            02/25/2022  Time:                                           16:00             Exam Ended: 02/25/22 14:31 Last Resulted: 02/25/22 16:00           CT Soft Tissue Neck With Contrast  Order: [id number]   Status: Final result     Visible to patient: Yes (not seen)     Next appt: 04/28/2022 at 11:00 AM in Internal Medicine (Makenna Davey MD)     Dx: Parotid mass     1 Result Note     1 Patient Communication    Details    Reading Physician Reading Date Result Priority   Chicho Guerrier MD  [phone] 3/11/2022 Routine     Narrative & Impression  EXAMINATION:  CT SOFT TISSUE NECK WITH CONTRAST     CLINICAL HISTORY:  Other diseases of salivary glands, left parotid mass     TECHNIQUE:  CT of the neck soft tissues performed with 100 cc Omnipaque 350 all CT scans at  this facility are performed  using dose modulation techniques as appropriate to performed exam including the following:  automated exposure control; adjustment of mA and/or kV according to the patients size (this includes techniques or standardized protocols for targeted exams where dose is matched to indication/reason for exam: i.e. extremities or head);  iterative reconstruction technique.     COMPARISON:  Ultrasound 02/25/2022.     FINDINGS:  There appear to be 4 or 5 separate ovoid and or nodular soft tissue masses of the left parotid gland.  The largest is 11.5 x 16.7 mm.  The 2nd largest is approximately 11.3 x 16.2 mm.  These appear to be intraparotid or periparotid in location.     The right parotid gland appears grossly normal.     The submandibular glands appear symmetric.     There are several small scattered internal jugular chain and posterior cervical triangle lymph nodes     The thyroid gland region is grossly normal.     The carotid vasculature is medially deviated and reaching near the midline at the level of the hypopharynx.     The larynx appears normal.     Cervical degenerative disc disease with grade 1 C3-4 spondylolisthesis.     Impression:     Multiple round and or ovoid nodular enhancing lesions of the left parotid gland most consistent with intraparotid/periparotid lymph nodes.        Electronically signed by: Chicho Guerrier MD  Date:                                            03/11/2022  Time:                                           10:51     US Soft Tissue Head Neck Thyroid  Order: [id number]   Status: Final result     Visible to patient: Yes (not seen)     Next appt: 07/15/2022 at 10:15 AM in Ophthalmology (Sorin Mcmansu MD)     Dx: Lymphadenopathy     2 Result Notes     1 Patient Communication    Details    Reading Physician Reading Date Result Priority   Herson Dunlap MD  [phone] 5/13/2022 Routine     Narrative & Impression  EXAMINATION:  US SOFT TISSUE HEAD NECK  THYROID     CLINICAL HISTORY:  .  Generalized enlarged lymph nodes     TECHNIQUE:  Ultrasound of the soft tissues of the left neck was performed.     COMPARISON:  02/25/2022.     FINDINGS:  Previously noted multiple lymph nodes in the left neck and periparotid region have decreased in size or resolved.  Currently 3 morphologically normal and nonenlarged nodes are identified which are subcentimeter in short axis diameter (3-6 mm).  No mass or other abnormality noted.     Impression:     Interval improvement in prior left cervical lymphadenopathy.        Electronically signed by: DENNIS Dunlap MD  Date:                                            05/13/2022  Time:                                           12:23           Reviewed recent notes from PCP, Dr. Davey    Assessment & Plan:   Lymphadenopathy      She had a recent u/s which demonstrated a 19mm and a 24 mm lymph node in the area of her tail of parotid.  Today these are tender to palpation.  It is suspicious that these may be within the parotid gland itself given their location.  We agreed to treat her with another round of antibiotics given the tenderness, but I also recommended a CT neck for further detail.    RTC 2 weeks.    3/29/22 update:  She has very mild asymmetry in her left parotid gland compared to her right.  The discrete palpable swellings noticed at the last visit seem to have resolved.  She does not have any significant tenderness.  We reviewed her CT results which shows some inflammation in that gland along with 2 enlarged lymph nodes/masses.  Clinically this seems to have resolved with antibiotics.  We agreed to follow-up again in 6 weeks and possibly repeat an ultrasound at that time.  She agrees with and understands the plan.    5/17/22 update:  Reviewed her exam and recent ultrasound findings which only showed 3 very small subcentimeter lymph nodes.  She can follow up p.r.n..

## 2022-07-29 ENCOUNTER — OFFICE VISIT (OUTPATIENT)
Dept: OPHTHALMOLOGY | Facility: CLINIC | Age: 72
End: 2022-07-29
Payer: MEDICARE

## 2022-07-29 DIAGNOSIS — H26.491 PCO (POSTERIOR CAPSULAR OPACIFICATION), RIGHT: ICD-10-CM

## 2022-07-29 DIAGNOSIS — H04.123 DRY EYE SYNDROME, BILATERAL: Primary | ICD-10-CM

## 2022-07-29 DIAGNOSIS — Z96.1 PSEUDOPHAKIA OF BOTH EYES: ICD-10-CM

## 2022-07-29 PROCEDURE — 99214 OFFICE O/P EST MOD 30 MIN: CPT | Mod: S$GLB,,, | Performed by: OPHTHALMOLOGY

## 2022-07-29 PROCEDURE — 1160F RVW MEDS BY RX/DR IN RCRD: CPT | Mod: CPTII,S$GLB,, | Performed by: OPHTHALMOLOGY

## 2022-07-29 PROCEDURE — 1159F MED LIST DOCD IN RCRD: CPT | Mod: CPTII,S$GLB,, | Performed by: OPHTHALMOLOGY

## 2022-07-29 PROCEDURE — 99214 PR OFFICE/OUTPT VISIT, EST, LEVL IV, 30-39 MIN: ICD-10-PCS | Mod: S$GLB,,, | Performed by: OPHTHALMOLOGY

## 2022-07-29 PROCEDURE — 1160F PR REVIEW ALL MEDS BY PRESCRIBER/CLIN PHARMACIST DOCUMENTED: ICD-10-PCS | Mod: CPTII,S$GLB,, | Performed by: OPHTHALMOLOGY

## 2022-07-29 PROCEDURE — 99999 PR PBB SHADOW E&M-EST. PATIENT-LVL II: CPT | Mod: PBBFAC,,, | Performed by: OPHTHALMOLOGY

## 2022-07-29 PROCEDURE — 1159F PR MEDICATION LIST DOCUMENTED IN MEDICAL RECORD: ICD-10-PCS | Mod: CPTII,S$GLB,, | Performed by: OPHTHALMOLOGY

## 2022-07-29 PROCEDURE — 99999 PR PBB SHADOW E&M-EST. PATIENT-LVL II: ICD-10-PCS | Mod: PBBFAC,,, | Performed by: OPHTHALMOLOGY

## 2022-07-29 NOTE — PROGRESS NOTES
HPI     Dry Eye     Comments: 1 year Dry Eye              Comments     Patient states that OU are dry occas - using Systane/ PM Gel - denies va   changes OU -       Saw MGM years ago    1. Phaco OU w/ MGM 2000            Last edited by Mely Quinonez MA on 7/29/2022  9:49 AM. (History)            Assessment /Plan     For exam results, see Encounter Report.      ICD-10-CM ICD-9-CM    1. Dry eye syndrome, bilateral  H04.123 375.15 Findings and symptoms consistent with mild dry eyes.   Recommend regular use of Artificial Tears. These should be thought of as Ocular Surface Moisturizers similar to skin moisturizers in that regular use is required to achieve maximum benefit.  Specifically, I recommend the following:    Systane Ultra or Refresh Optive Quincy 3 : 3-4 times a day.   The first dose upon awakening is most important because we do not make tears at night.  Avoid generic products as they contain Benzalkonium Chloride as a preservative. This is a very irritating chemical and can make your eyes worse.    Omega 3 Fish Oils  1000 to 2000 mgs per day of Nordic Naturals or PRN Dry Eye formula [employer]   2. Pseudophakia of both eyes  Z96.1 V43.1 Stable-- monitor   3. 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 Yag laser treatment as needed if visual loss occurs.        Return to clinic 1 year with SYDNIT

## 2022-08-03 ENCOUNTER — HOSPITAL ENCOUNTER (OUTPATIENT)
Dept: CARDIOLOGY | Facility: HOSPITAL | Age: 72
Discharge: HOME | End: 2022-08-03
Payer: MEDICARE

## 2022-08-03 ENCOUNTER — OFFICE VISIT (OUTPATIENT)
Dept: INTERNAL MEDICINE | Facility: CLINIC | Age: 72
End: 2022-08-03
Payer: MEDICARE

## 2022-08-03 VITALS
OXYGEN SATURATION: 95 % | HEIGHT: 63 IN | TEMPERATURE: 98 F | BODY MASS INDEX: 40.18 KG/M2 | SYSTOLIC BLOOD PRESSURE: 118 MMHG | HEART RATE: 80 BPM | WEIGHT: 226.75 LBS | DIASTOLIC BLOOD PRESSURE: 70 MMHG

## 2022-08-03 DIAGNOSIS — R06.09 DYSPNEA ON EXERTION: ICD-10-CM

## 2022-08-03 DIAGNOSIS — R51.9 NONINTRACTABLE HEADACHE, UNSPECIFIED CHRONICITY PATTERN, UNSPECIFIED HEADACHE TYPE: ICD-10-CM

## 2022-08-03 DIAGNOSIS — R06.09 DYSPNEA ON EXERTION: Primary | ICD-10-CM

## 2022-08-03 LAB
CTP QC/QA: YES
SARS-COV-2 RDRP RESP QL NAA+PROBE: NEGATIVE

## 2022-08-03 PROCEDURE — 1125F PR PAIN SEVERITY QUANTIFIED, PAIN PRESENT: ICD-10-PCS | Mod: CPTII,S$GLB,, | Performed by: FAMILY MEDICINE

## 2022-08-03 PROCEDURE — 99214 PR OFFICE/OUTPT VISIT, EST, LEVL IV, 30-39 MIN: ICD-10-PCS | Mod: S$GLB,,, | Performed by: FAMILY MEDICINE

## 2022-08-03 PROCEDURE — 3078F DIAST BP <80 MM HG: CPT | Mod: CPTII,S$GLB,, | Performed by: FAMILY MEDICINE

## 2022-08-03 PROCEDURE — 1125F AMNT PAIN NOTED PAIN PRSNT: CPT | Mod: CPTII,S$GLB,, | Performed by: FAMILY MEDICINE

## 2022-08-03 PROCEDURE — 1101F PR PT FALLS ASSESS DOC 0-1 FALLS W/OUT INJ PAST YR: ICD-10-PCS | Mod: CPTII,S$GLB,, | Performed by: FAMILY MEDICINE

## 2022-08-03 PROCEDURE — 1159F MED LIST DOCD IN RCRD: CPT | Mod: CPTII,S$GLB,, | Performed by: FAMILY MEDICINE

## 2022-08-03 PROCEDURE — 1159F PR MEDICATION LIST DOCUMENTED IN MEDICAL RECORD: ICD-10-PCS | Mod: CPTII,S$GLB,, | Performed by: FAMILY MEDICINE

## 2022-08-03 PROCEDURE — U0002 COVID-19 LAB TEST NON-CDC: HCPCS | Mod: QW,S$GLB,, | Performed by: FAMILY MEDICINE

## 2022-08-03 PROCEDURE — 99999 PR PBB SHADOW E&M-EST. PATIENT-LVL V: ICD-10-PCS | Mod: PBBFAC,,, | Performed by: FAMILY MEDICINE

## 2022-08-03 PROCEDURE — 99999 PR PBB SHADOW E&M-EST. PATIENT-LVL V: CPT | Mod: PBBFAC,,, | Performed by: FAMILY MEDICINE

## 2022-08-03 PROCEDURE — 3074F PR MOST RECENT SYSTOLIC BLOOD PRESSURE < 130 MM HG: ICD-10-PCS | Mod: CPTII,S$GLB,, | Performed by: FAMILY MEDICINE

## 2022-08-03 PROCEDURE — U0002: ICD-10-PCS | Mod: QW,S$GLB,, | Performed by: FAMILY MEDICINE

## 2022-08-03 PROCEDURE — 1160F RVW MEDS BY RX/DR IN RCRD: CPT | Mod: CPTII,S$GLB,, | Performed by: FAMILY MEDICINE

## 2022-08-03 PROCEDURE — 1101F PT FALLS ASSESS-DOCD LE1/YR: CPT | Mod: CPTII,S$GLB,, | Performed by: FAMILY MEDICINE

## 2022-08-03 PROCEDURE — 3288F PR FALLS RISK ASSESSMENT DOCUMENTED: ICD-10-PCS | Mod: CPTII,S$GLB,, | Performed by: FAMILY MEDICINE

## 2022-08-03 PROCEDURE — 3078F PR MOST RECENT DIASTOLIC BLOOD PRESSURE < 80 MM HG: ICD-10-PCS | Mod: CPTII,S$GLB,, | Performed by: FAMILY MEDICINE

## 2022-08-03 PROCEDURE — 1160F PR REVIEW ALL MEDS BY PRESCRIBER/CLIN PHARMACIST DOCUMENTED: ICD-10-PCS | Mod: CPTII,S$GLB,, | Performed by: FAMILY MEDICINE

## 2022-08-03 PROCEDURE — 3074F SYST BP LT 130 MM HG: CPT | Mod: CPTII,S$GLB,, | Performed by: FAMILY MEDICINE

## 2022-08-03 PROCEDURE — 3288F FALL RISK ASSESSMENT DOCD: CPT | Mod: CPTII,S$GLB,, | Performed by: FAMILY MEDICINE

## 2022-08-03 PROCEDURE — 3008F BODY MASS INDEX DOCD: CPT | Mod: CPTII,S$GLB,, | Performed by: FAMILY MEDICINE

## 2022-08-03 PROCEDURE — 3008F PR BODY MASS INDEX (BMI) DOCUMENTED: ICD-10-PCS | Mod: CPTII,S$GLB,, | Performed by: FAMILY MEDICINE

## 2022-08-03 PROCEDURE — 99214 OFFICE O/P EST MOD 30 MIN: CPT | Mod: S$GLB,,, | Performed by: FAMILY MEDICINE

## 2022-08-03 NOTE — PROGRESS NOTES
Subjective:       Patient ID: Dory Xie is a 72 y.o. female.    Chief Complaint: Shortness of Breath    PCP: Dr. Davey    Patient is new to me. Patient presents to clinic today for appointment regarding back pain. However on rooming patient she reports to staff that she has shortness of breath with exertion and black spots in her vision. On chart review, I noted that patient purchased home covid test yesterday. On questioning, she reports to staff that she had sinus symptoms and headache yesterday, but did not do home covid test.    Patient reports she came in for orders for physical therapy for her sciatic pain.  Seeing black dots that started today.  Reports shortness of breath with exertion over the last month.  She reports mild headache today.  Reports sinus symptoms that are chronic.  Reports having covid in June.  Denies chest pain or presyncope.    Review of Systems   Constitutional: Negative for chills, fatigue, fever and unexpected weight change.   Eyes: Positive for visual disturbance (not currently).   Respiratory: Positive for shortness of breath. Negative for cough.    Cardiovascular: Negative for chest pain.   Musculoskeletal: Negative for myalgias.   Neurological: Negative for headaches.         Objective:      Physical Exam  Vitals reviewed.   Constitutional:       General: She is not in acute distress.     Appearance: She is well-developed.   HENT:      Head: Normocephalic and atraumatic.   Eyes:      General: Lids are normal. No scleral icterus.     Extraocular Movements: Extraocular movements intact.      Conjunctiva/sclera: Conjunctivae normal.      Pupils: Pupils are equal, round, and reactive to light.   Cardiovascular:      Rate and Rhythm: Normal rate and regular rhythm.      Heart sounds: No murmur heard.    No friction rub. No gallop.   Pulmonary:      Effort: Pulmonary effort is normal.      Breath sounds: Normal breath sounds. No decreased breath sounds, wheezing, rhonchi or  rales.   Neurological:      Mental Status: She is alert and oriented to person, place, and time.      Cranial Nerves: No cranial nerve deficit.      Gait: Gait normal.   Psychiatric:         Mood and Affect: Mood and affect normal.         Assessment:       1. Dyspnea on exertion    2. Nonintractable headache, unspecified chronicity pattern, unspecified headache type        Plan:   1. Dyspnea on exertion  -     EKG 12-lead  -     POCT COVID-19 Rapid Screening  -     Ambulatory referral/consult to Cardiology    2. Nonintractable headache, unspecified chronicity pattern, unspecified headache type  -     POCT COVID-19 Rapid Screening    covid screen is negative.  EKG shows NSR, LAE  Advised cardiology evaluation.  ER if severe/persistent symptoms.  Patient expressed understanding and agreement with plan.

## 2022-08-05 ENCOUNTER — TELEPHONE (OUTPATIENT)
Dept: INTERNAL MEDICINE | Facility: CLINIC | Age: 72
End: 2022-08-05
Payer: MEDICARE

## 2022-08-05 DIAGNOSIS — R06.09 DYSPNEA ON EXERTION: Primary | ICD-10-CM

## 2022-08-05 NOTE — TELEPHONE ENCOUNTER
----- Message from Soheila Avendano sent at 8/5/2022  9:19 AM CDT -----  Contact: [phone]  Patient would like to consult with a nurse in regards to a referral for a cardiologist. Please call back at [phone]. Thanks r/s

## 2022-08-09 ENCOUNTER — TELEPHONE (OUTPATIENT)
Dept: INTERNAL MEDICINE | Facility: CLINIC | Age: 72
End: 2022-08-09
Payer: MEDICARE

## 2022-08-09 NOTE — TELEPHONE ENCOUNTER
----- Message from Shelby Morales sent at 8/9/2022 11:46 AM CDT -----  Contact: Pt [phone]  PT calling to speak with the nurse regarding a referral that was supposed to be sent to Dr Antoine Duggan office but they still have not received it. Please call to advise when fax to [phone] At: Js

## 2022-11-01 ENCOUNTER — TELEPHONE (OUTPATIENT)
Dept: INTERNAL MEDICINE | Facility: CLINIC | Age: 72
End: 2022-11-01
Payer: MEDICARE

## 2023-01-04 ENCOUNTER — TELEPHONE (OUTPATIENT)
Dept: INTERNAL MEDICINE | Facility: CLINIC | Age: 73
End: 2023-01-04
Payer: MEDICARE

## 2023-01-04 NOTE — TELEPHONE ENCOUNTER
Called and spoke with Nurse from [facility]. Patient was recently discharged from the hospital now with anemia. Patient scheduled to see Dr. Davey Monday at 10:40. The nurse said she would fax H&P and discharge paperwork to us.    Appointment was scheduled with patient over the phone.

## 2023-01-04 NOTE — TELEPHONE ENCOUNTER
----- Message from Anna Omer sent at 1/4/2023  3:25 PM CST -----  Contact: Katherine Murphy from [facility] Is requesting a callback from the nurse in regards to providing an update on a patient diacharged 01/04/2023.       Please call Katherine at [phone]     Thanks

## 2023-01-06 ENCOUNTER — PATIENT OUTREACH (OUTPATIENT)
Dept: ADMINISTRATIVE | Facility: HOSPITAL | Age: 73
End: 2023-01-06
Payer: MEDICARE

## 2023-01-06 NOTE — PROGRESS NOTES
Called patient to confirm hospital follow up scheduled on 1/09/2023.   Patient is confirmed. Encouraged patient to bring all medications and BP cuff to follow up visit.

## 2023-01-11 ENCOUNTER — PATIENT OUTREACH (OUTPATIENT)
Dept: ADMINISTRATIVE | Facility: HOSPITAL | Age: 73
End: 2023-01-11
Payer: MEDICARE

## 2023-01-12 ENCOUNTER — OFFICE VISIT (OUTPATIENT)
Dept: INTERNAL MEDICINE | Facility: CLINIC | Age: 73
End: 2023-01-12
Payer: MEDICARE

## 2023-01-12 ENCOUNTER — HOSPITAL ENCOUNTER (OUTPATIENT)
Dept: RADIOLOGY | Facility: HOSPITAL | Age: 73
Discharge: HOME | End: 2023-01-12
Attending: FAMILY MEDICINE
Payer: MEDICARE

## 2023-01-12 VITALS
DIASTOLIC BLOOD PRESSURE: 62 MMHG | HEART RATE: 104 BPM | SYSTOLIC BLOOD PRESSURE: 98 MMHG | BODY MASS INDEX: 37.45 KG/M2 | WEIGHT: 211.44 LBS | TEMPERATURE: 97 F | OXYGEN SATURATION: 98 % | RESPIRATION RATE: 20 BRPM

## 2023-01-12 DIAGNOSIS — Z87.01 HISTORY OF PNEUMONIA: ICD-10-CM

## 2023-01-12 DIAGNOSIS — F32.1 MAJOR DEPRESSIVE DISORDER, SINGLE EPISODE, MODERATE: ICD-10-CM

## 2023-01-12 DIAGNOSIS — R11.0 NAUSEA: ICD-10-CM

## 2023-01-12 DIAGNOSIS — D64.9 ANEMIA, UNSPECIFIED TYPE: Primary | ICD-10-CM

## 2023-01-12 DIAGNOSIS — Z09 HOSPITAL DISCHARGE FOLLOW-UP: ICD-10-CM

## 2023-01-12 PROCEDURE — 71046 X-RAY EXAM CHEST 2 VIEWS: CPT | Mod: TC

## 2023-01-12 PROCEDURE — 71046 XR CHEST PA AND LATERAL: ICD-10-PCS | Mod: 26,,, | Performed by: STUDENT IN AN ORGANIZED HEALTH CARE EDUCATION/TRAINING PROGRAM

## 2023-01-12 PROCEDURE — 1126F PR PAIN SEVERITY QUANTIFIED, NO PAIN PRESENT: ICD-10-PCS | Mod: CPTII,S$GLB,, | Performed by: FAMILY MEDICINE

## 2023-01-12 PROCEDURE — 1126F AMNT PAIN NOTED NONE PRSNT: CPT | Mod: CPTII,S$GLB,, | Performed by: FAMILY MEDICINE

## 2023-01-12 PROCEDURE — 3288F FALL RISK ASSESSMENT DOCD: CPT | Mod: CPTII,S$GLB,, | Performed by: FAMILY MEDICINE

## 2023-01-12 PROCEDURE — 1101F PT FALLS ASSESS-DOCD LE1/YR: CPT | Mod: CPTII,S$GLB,, | Performed by: FAMILY MEDICINE

## 2023-01-12 PROCEDURE — 3074F PR MOST RECENT SYSTOLIC BLOOD PRESSURE < 130 MM HG: ICD-10-PCS | Mod: CPTII,S$GLB,, | Performed by: FAMILY MEDICINE

## 2023-01-12 PROCEDURE — 3008F BODY MASS INDEX DOCD: CPT | Mod: CPTII,S$GLB,, | Performed by: FAMILY MEDICINE

## 2023-01-12 PROCEDURE — 99999 PR PBB SHADOW E&M-EST. PATIENT-LVL IV: ICD-10-PCS | Mod: PBBFAC,,, | Performed by: FAMILY MEDICINE

## 2023-01-12 PROCEDURE — 3008F PR BODY MASS INDEX (BMI) DOCUMENTED: ICD-10-PCS | Mod: CPTII,S$GLB,, | Performed by: FAMILY MEDICINE

## 2023-01-12 PROCEDURE — 99214 PR OFFICE/OUTPT VISIT, EST, LEVL IV, 30-39 MIN: ICD-10-PCS | Mod: S$GLB,,, | Performed by: FAMILY MEDICINE

## 2023-01-12 PROCEDURE — 1101F PR PT FALLS ASSESS DOC 0-1 FALLS W/OUT INJ PAST YR: ICD-10-PCS | Mod: CPTII,S$GLB,, | Performed by: FAMILY MEDICINE

## 2023-01-12 PROCEDURE — 71046 X-RAY EXAM CHEST 2 VIEWS: CPT | Mod: 26,,, | Performed by: STUDENT IN AN ORGANIZED HEALTH CARE EDUCATION/TRAINING PROGRAM

## 2023-01-12 PROCEDURE — 3078F DIAST BP <80 MM HG: CPT | Mod: CPTII,S$GLB,, | Performed by: FAMILY MEDICINE

## 2023-01-12 PROCEDURE — 3074F SYST BP LT 130 MM HG: CPT | Mod: CPTII,S$GLB,, | Performed by: FAMILY MEDICINE

## 2023-01-12 PROCEDURE — 1159F PR MEDICATION LIST DOCUMENTED IN MEDICAL RECORD: ICD-10-PCS | Mod: CPTII,S$GLB,, | Performed by: FAMILY MEDICINE

## 2023-01-12 PROCEDURE — 3288F PR FALLS RISK ASSESSMENT DOCUMENTED: ICD-10-PCS | Mod: CPTII,S$GLB,, | Performed by: FAMILY MEDICINE

## 2023-01-12 PROCEDURE — 3078F PR MOST RECENT DIASTOLIC BLOOD PRESSURE < 80 MM HG: ICD-10-PCS | Mod: CPTII,S$GLB,, | Performed by: FAMILY MEDICINE

## 2023-01-12 PROCEDURE — 1159F MED LIST DOCD IN RCRD: CPT | Mod: CPTII,S$GLB,, | Performed by: FAMILY MEDICINE

## 2023-01-12 PROCEDURE — 99999 PR PBB SHADOW E&M-EST. PATIENT-LVL IV: CPT | Mod: PBBFAC,,, | Performed by: FAMILY MEDICINE

## 2023-01-12 PROCEDURE — 99214 OFFICE O/P EST MOD 30 MIN: CPT | Mod: S$GLB,,, | Performed by: FAMILY MEDICINE

## 2023-01-12 RX ORDER — ATORVASTATIN CALCIUM 20 MG/1
20 TABLET, FILM COATED ORAL
COMMUNITY
Start: 2022-12-14

## 2023-01-12 RX ORDER — PREDNISONE 20 MG/1
40 TABLET ORAL DAILY
Qty: 10 TABLET | Refills: 0 | Status: SHIPPED | OUTPATIENT
Start: 2023-01-12 | End: 2023-01-17

## 2023-01-12 RX ORDER — ALBUTEROL SULFATE 90 UG/1
2 AEROSOL, METERED RESPIRATORY (INHALATION) EVERY 4 HOURS PRN
Qty: 18 G | Refills: 0 | Status: SHIPPED | OUTPATIENT
Start: 2023-01-12 | End: 2023-02-03 | Stop reason: SDUPTHER

## 2023-01-12 RX ORDER — ONDANSETRON 8 MG/1
8 TABLET, ORALLY DISINTEGRATING ORAL ONCE
Qty: 20 TABLET | Refills: 0 | Status: SHIPPED | OUTPATIENT
Start: 2023-01-12 | End: 2023-01-12

## 2023-01-12 RX ORDER — AMOXICILLIN AND CLAVULANATE POTASSIUM 875; 125 MG/1; MG/1
1 TABLET, FILM COATED ORAL EVERY 12 HOURS
Qty: 20 TABLET | Refills: 0 | Status: ON HOLD | OUTPATIENT
Start: 2023-01-12 | End: 2023-03-16 | Stop reason: HOSPADM

## 2023-01-12 NOTE — PROGRESS NOTES
Dory Xie  01/12/2023  [id number]    Makenna Davey MD  Patient Care Team:  Makenna Davey MD as PCP - General (Family Medicine)  Kely Salazar MD as Consulting Physician (Physical Medicine and Rehabilitation)  Iliana Benoit LPN as Care Coordinator (Internal Medicine)  Sorin Mcmanus MD as Consulting Physician (Ophthalmology)    Transitional Care Note    Family and/or Caretaker present at visit?  No.  Diagnostic tests reviewed/disposition: No diagnosic tests pending after this hospitalization.  Disease/illness education: Influenza  Home health/community services discussion/referrals: Patient does not have home health established from hospital visit.  They do not need home health.  If needed, we will set up home health for the patient.   Establishment or re-establishment of referral orders for community resources: No other necessary community resources.   Discussion with other health care providers: No discussion with other health care providers necessary.        Admitted 12/30 at [facility]    Visit Type:a scheduled visit following a recent hospitalization    Chief Complaint:  Chief Complaint   Patient presents with    Hospital Follow Up     [facility] stay from 12/30/22-1/2/23. Patient is extremely dizzy and SOB today. When attempting to take deep breaths she goes into coughing spells. When she starts coughing, she has incontinence requiring her to wear a brief. Her SATs were 98% at rest with room air and dropped down to 92-93% after ambulating for about 30 feet.        History of Present Illness:  Admitted to [facility] for Influenza A viral pneumonia and sepsis with JESSICA.  Her baseline cr 1.2, she was 1.4 at ER    Treated with antiviral, given fluids.  Did okay and was discharged home for followup  Tx with Azithromycin.  Lower BP at admit, responded to fluids  Never dropped before 90% on Oxygen so, was not discharge home on oxygen.    Did not require oxygen upon discharge    Does have coughing  spells, makes her dizziness. Sats do drop, but not below 92%  Documented bronchospams, tx with ALbuterol with a lot of coughing, which she continues to do this post discharge.    She was not given steriods.    She had dropped in the CBC/Anemia  She needs recheck.            History:  Past Medical History:   Diagnosis Date    Arthritis     Cancer     cervical    Chronic pain     From osteoarthritis    Colitis     GERD (gastroesophageal reflux disease)     Seasonal allergies      Past Surgical History:   Procedure Laterality Date    COLONOSCOPY N/A 09/21/2016    Procedure: COLONOSCOPY;  Surgeon: Hector Summers III, MD;  Location: [facility];  Service: Endoscopy;  Laterality: N/A;    EYE SURGERY      HYSTERECTOMY      29yrs old    JOINT REPLACEMENT  Both knees    KNEE SURGERY Left     KNEE SURGERY Right     TONSILLECTOMY      TOTAL KNEE ARTHROPLASTY Bilateral 2019    TUBAL LIGATION       Family History   Problem Relation Age of Onset    Diabetes Mother     Heart disease Mother     Cancer Mother         lung    Cancer Father         Lung    Hyperlipidemia Father     Hypertension Other     Breast cancer Sister      Social History     Socioeconomic History    Marital status: Single   Tobacco Use    Smoking status: Never     Passive exposure: Never    Smokeless tobacco: Never   Substance and Sexual Activity    Alcohol use: No    Drug use: No     Patient Active Problem List   Diagnosis    Primary osteoarthritis of both knees    Chronic pain    Diet-controlled hyperlipidemia    Seasonal allergies    Osteoporosis    Family history of arteriosclerotic cardiovascular disease    Colitis    Vertigo    Major depressive disorder, single episode, moderate     Review of patient's allergies indicates:  No Known Allergies    The following were reviewed at this visit: active problem list, medication list, allergies, family history, social history, and health maintenance.    Medications:  Current Outpatient Medications on File Prior to  Visit   Medication Sig Dispense Refill    azelastine (ASTELIN) 137 mcg (0.1 %) nasal spray 1 spray by Nasal route 2 (two) times daily.      FLUoxetine 20 MG capsule Take 1 capsule (20 mg total) by mouth once daily. 30 capsule 11    fluticasone propionate (FLONASE) 50 mcg/actuation nasal spray 2 sprays (100 mcg total) by Each Nostril route once daily. 16 g 4    loratadine (CLARITIN) 10 mg tablet Take 1 tablet (10 mg total) by mouth once daily. 30 tablet 11    pantoprazole (PROTONIX) 20 MG tablet Take 1 tablet (20 mg total) by mouth once daily. 90 tablet 3    potassium gluconate 595 (99) mg Tab Take 1 tablet by mouth Daily.      atorvastatin (LIPITOR) 20 MG tablet Take 20 mg by mouth.       No current facility-administered medications on file prior to visit.       Medications have been reviewed and reconciled with patient at this visit.  Barriers to medications reviewed with patient.    Adverse reactions to current medications reviewed with patient..    Over the counter medications reviewed and reconciled with patient.    Exam:  Wt Readings from Last 3 Encounters:   01/12/23 95.9 kg (211 lb 6.7 oz)   08/03/22 102.9 kg (226 lb 11.9 oz)   05/17/22 105.4 kg (232 lb 5.8 oz)     Temp Readings from Last 3 Encounters:   01/12/23 97.3 °F (36.3 °C)   08/03/22 97.9 °F (36.6 °C) (Tympanic)   05/17/22 97.5 °F (36.4 °C) (Temporal)     BP Readings from Last 3 Encounters:   01/12/23 98/62   08/03/22 118/70   04/28/22 110/74     Pulse Readings from Last 3 Encounters:   01/12/23 104   08/03/22 80   04/28/22 110     Body mass index is 37.45 kg/m².      Review of Systems   Constitutional:  Positive for malaise/fatigue. Negative for chills and fever.   HENT: Negative.  Negative for congestion, sinus pain and sore throat.    Eyes:  Negative for blurred vision and double vision.   Respiratory:  Positive for cough. Negative for sputum production, shortness of breath and wheezing.    Cardiovascular:  Negative for chest pain, palpitations and  leg swelling.   Gastrointestinal:  Negative for abdominal pain, constipation, diarrhea, heartburn, nausea and vomiting.   Genitourinary: Negative.    Musculoskeletal: Negative.    Skin: Negative.  Negative for rash.   Neurological:  Positive for dizziness.   Endo/Heme/Allergies: Negative.  Negative for polydipsia. Does not bruise/bleed easily.   Psychiatric/Behavioral:  Negative for depression and substance abuse.    Physical Exam  Nursing note reviewed.   Cardiovascular:      Rate and Rhythm: Normal rate and regular rhythm.   Pulmonary:      Effort: Pulmonary effort is normal. No respiratory distress.   Neurological:      Mental Status: She is alert and oriented to person, place, and time.   Psychiatric:         Mood and Affect: Mood normal.         Behavior: Behavior normal.         Thought Content: Thought content normal.         Judgment: Judgment normal.       Laboratory Reviewed ({Yes)  Lab Results   Component Value Date    WBC 4.58 02/17/2022    HGB 11.0 (L) 02/17/2022    HCT 36.3 (L) 02/17/2022    PLT 59 (L) 02/17/2022    CHOL 184 02/17/2022    TRIG 137 02/17/2022    HDL 32 (L) 02/17/2022    ALT 10 02/17/2022    AST 13 02/17/2022     02/17/2022    K 4.5 02/17/2022     02/17/2022    CREATININE 1.1 02/17/2022    BUN 23 02/17/2022    CO2 23 02/17/2022    TSH 1.758 09/15/2017    INR 1.1 09/10/2016    HGBA1C 5.4 02/19/2014       Dory was seen today for hospital follow up.    Diagnoses and all orders for this visit:    Anemia, unspecified type    History of pneumonia  -     Basic Metabolic Panel; Future  -     X-Ray Chest PA And Lateral; Future  -     CBC Auto Differential; Future  -     Ambulatory referral/consult to Home Health; Future  -     Urinalysis, Reflex to Urine Culture Urine, Clean Catch; Future    Hospital discharge follow-up  -     Basic Metabolic Panel; Future  -     X-Ray Chest PA And Lateral; Future  -     CBC Auto Differential; Future  -     Ambulatory referral/consult to Home  Health; Future  -     Urinalysis, Reflex to Urine Culture Urine, Clean Catch; Future  -     Urinalysis, Reflex to Urine Culture Urine, Clean Catch; Future    Major depressive disorder, single episode, moderate    Nausea  -     ondansetron (ZOFRAN-ODT) 8 MG TbDL; Take 1 tablet (8 mg total) by mouth once. for 1 dose    Other orders  -     predniSONE (DELTASONE) 20 MG tablet; Take 2 tablets (40 mg total) by mouth once daily. for 5 days  -     amoxicillin-clavulanate 875-125mg (AUGMENTIN) 875-125 mg per tablet; Take 1 tablet by mouth every 12 (twelve) hours.  -     albuterol (PROVENTIL HFA) 90 mcg/actuation inhaler; Inhale 2 puffs into the lungs every 4 (four) hours as needed for Wheezing. Rescue        Tx for Pneumonia  She is still with sign of Pneumonia and wheezing  Augmentin and Medrol    Use Albuterol    Check Labs  Still could be drinking water.    Will recheck 1 week.          Care Plan/Goals: Reviewed    Goals        Blood Pressure < 140/90      LDL CHOLESTEROL < 130      Weight < 190 lb (86.183 kg)             Follow up: No follow-ups on file.    After visit summary was printed and given to patient upon discharge today.  Patient goals and care plan are included in After Visit Summary.

## 2023-01-13 ENCOUNTER — TELEPHONE (OUTPATIENT)
Dept: HEMATOLOGY/ONCOLOGY | Facility: CLINIC | Age: 73
End: 2023-01-13
Payer: MEDICARE

## 2023-01-13 DIAGNOSIS — D64.9 ANEMIA, UNSPECIFIED TYPE: Primary | ICD-10-CM

## 2023-01-13 NOTE — PROGRESS NOTES
Patient, Dory Xie (MRN #[id number]), presented with a recent Platelet count less than 150 K/uL consistent with the definition of thrombocytopenia (ICD10 - D69.6). We repeated labs today. Anemia noted on last labs.    Platelets   Date Value Ref Range Status   01/12/2023 74 (L) 150 - 450 K/uL Final     The patient's thrombocytopenia was monitored, evaluated, addressed and/or treated. This addendum to the medical record is made on 01/13/2023.

## 2023-01-13 NOTE — TELEPHONE ENCOUNTER
"Spoke to patient in reference to Hematology referral from Dr. Davey.   Patient states "I was not aware of referral."  Message sent to referring provider    "

## 2023-01-17 ENCOUNTER — TELEPHONE (OUTPATIENT)
Dept: INTERNAL MEDICINE | Facility: CLINIC | Age: 73
End: 2023-01-17
Payer: MEDICARE

## 2023-01-17 NOTE — TELEPHONE ENCOUNTER
----- Message from Debra Washington sent at 1/17/2023  8:58 AM CST -----  Contact: Dory  Patient is requesting a call to discuss results from test and xray's. Pt would also like to know why she was referred too hematology. Please call her back at [phone].          Thanks  DD

## 2023-01-17 NOTE — TELEPHONE ENCOUNTER
"----- Message from Makenna Davey MD sent at 1/17/2023  8:48 AM CST -----  I put it in the result notes, due to her Anemia, we need to see Heme.  Not sure if she needs iron, but we need her blood count to come it,     ----- Message -----  From: Vijay Higuera MA  Sent: 1/17/2023   8:43 AM CST  To: Makenna Davey MD      ----- Message -----  From: Ramya Whyte LPN  Sent: 1/13/2023   4:20 PM CST  To: Petar Stein    Good afternoon, I spoke to patient in reference to Hematology referral from Dr. Davey.   Patient states "I was not aware of referral."  Please contact patient in reference to referral and place a new referral if needed.  Thank you, Devendra Martel Hematology Navigator - [facility].     Serving the [place], [place], [place] and [facility].  [phone] (P)  [phone](F)  [email or web address]          "

## 2023-01-18 ENCOUNTER — LAB VISIT (OUTPATIENT)
Dept: LAB | Facility: HOSPITAL | Age: 73
End: 2023-01-18
Attending: INTERNAL MEDICINE
Payer: MEDICARE

## 2023-01-18 ENCOUNTER — OFFICE VISIT (OUTPATIENT)
Dept: HEMATOLOGY/ONCOLOGY | Facility: CLINIC | Age: 73
End: 2023-01-18
Payer: MEDICARE

## 2023-01-18 VITALS
HEIGHT: 63 IN | DIASTOLIC BLOOD PRESSURE: 45 MMHG | SYSTOLIC BLOOD PRESSURE: 89 MMHG | OXYGEN SATURATION: 96 % | HEART RATE: 65 BPM | BODY MASS INDEX: 36.88 KG/M2 | WEIGHT: 208.13 LBS | TEMPERATURE: 97 F

## 2023-01-18 DIAGNOSIS — D64.9 ANEMIA, UNSPECIFIED TYPE: ICD-10-CM

## 2023-01-18 DIAGNOSIS — D69.6 THROMBOCYTOPENIA: Primary | ICD-10-CM

## 2023-01-18 PROCEDURE — 3288F PR FALLS RISK ASSESSMENT DOCUMENTED: ICD-10-PCS | Mod: CPTII,S$GLB,, | Performed by: INTERNAL MEDICINE

## 2023-01-18 PROCEDURE — 1126F PR PAIN SEVERITY QUANTIFIED, NO PAIN PRESENT: ICD-10-PCS | Mod: CPTII,S$GLB,, | Performed by: INTERNAL MEDICINE

## 2023-01-18 PROCEDURE — 36415 COLL VENOUS BLD VENIPUNCTURE: CPT | Performed by: FAMILY MEDICINE

## 2023-01-18 PROCEDURE — 1159F PR MEDICATION LIST DOCUMENTED IN MEDICAL RECORD: ICD-10-PCS | Mod: CPTII,S$GLB,, | Performed by: INTERNAL MEDICINE

## 2023-01-18 PROCEDURE — 1159F MED LIST DOCD IN RCRD: CPT | Mod: CPTII,S$GLB,, | Performed by: INTERNAL MEDICINE

## 2023-01-18 PROCEDURE — 99999 PR PBB SHADOW E&M-EST. PATIENT-LVL IV: CPT | Mod: PBBFAC,,, | Performed by: INTERNAL MEDICINE

## 2023-01-18 PROCEDURE — 3074F PR MOST RECENT SYSTOLIC BLOOD PRESSURE < 130 MM HG: ICD-10-PCS | Mod: CPTII,S$GLB,, | Performed by: INTERNAL MEDICINE

## 2023-01-18 PROCEDURE — 99999 PR PBB SHADOW E&M-EST. PATIENT-LVL IV: ICD-10-PCS | Mod: PBBFAC,,, | Performed by: INTERNAL MEDICINE

## 2023-01-18 PROCEDURE — 3008F PR BODY MASS INDEX (BMI) DOCUMENTED: ICD-10-PCS | Mod: CPTII,S$GLB,, | Performed by: INTERNAL MEDICINE

## 2023-01-18 PROCEDURE — 99204 OFFICE O/P NEW MOD 45 MIN: CPT | Mod: S$GLB,,, | Performed by: INTERNAL MEDICINE

## 2023-01-18 PROCEDURE — 1101F PT FALLS ASSESS-DOCD LE1/YR: CPT | Mod: CPTII,S$GLB,, | Performed by: INTERNAL MEDICINE

## 2023-01-18 PROCEDURE — 82607 VITAMIN B-12: CPT | Performed by: FAMILY MEDICINE

## 2023-01-18 PROCEDURE — 99204 PR OFFICE/OUTPT VISIT, NEW, LEVL IV, 45-59 MIN: ICD-10-PCS | Mod: S$GLB,,, | Performed by: INTERNAL MEDICINE

## 2023-01-18 PROCEDURE — 1126F AMNT PAIN NOTED NONE PRSNT: CPT | Mod: CPTII,S$GLB,, | Performed by: INTERNAL MEDICINE

## 2023-01-18 PROCEDURE — 3078F PR MOST RECENT DIASTOLIC BLOOD PRESSURE < 80 MM HG: ICD-10-PCS | Mod: CPTII,S$GLB,, | Performed by: INTERNAL MEDICINE

## 2023-01-18 PROCEDURE — 3078F DIAST BP <80 MM HG: CPT | Mod: CPTII,S$GLB,, | Performed by: INTERNAL MEDICINE

## 2023-01-18 PROCEDURE — 3008F BODY MASS INDEX DOCD: CPT | Mod: CPTII,S$GLB,, | Performed by: INTERNAL MEDICINE

## 2023-01-18 PROCEDURE — 1101F PR PT FALLS ASSESS DOC 0-1 FALLS W/OUT INJ PAST YR: ICD-10-PCS | Mod: CPTII,S$GLB,, | Performed by: INTERNAL MEDICINE

## 2023-01-18 PROCEDURE — 3074F SYST BP LT 130 MM HG: CPT | Mod: CPTII,S$GLB,, | Performed by: INTERNAL MEDICINE

## 2023-01-18 PROCEDURE — 3288F FALL RISK ASSESSMENT DOCD: CPT | Mod: CPTII,S$GLB,, | Performed by: INTERNAL MEDICINE

## 2023-01-18 PROCEDURE — 84466 ASSAY OF TRANSFERRIN: CPT | Performed by: FAMILY MEDICINE

## 2023-01-18 RX ORDER — ONDANSETRON 8 MG/1
8 TABLET, ORALLY DISINTEGRATING ORAL
COMMUNITY
Start: 2023-01-12

## 2023-01-18 NOTE — PROGRESS NOTES
Subjective:      DATE OF VISIT: 1/18/23     ?  Patient ID:?Dory Xie is a 73 y.o. female.?? MR#: [id number]   ?   REFERRING PROVIDER: Makenna Davey MD  [address] [place] [id number]     ? Primary Care Providers:  Makenna Davey MD, MD (General)     CHIEF COMPLAINT: ?Anemia??   ?   HPI    Ms. Davis is a 73-year-old woman with osteoarthritis chronic pain GERD and distant history cervical cancer in her 30s status post hysterectomy.  She is referred for further evaluation of anemia.    She was recently inpatient at [facility] for URI with hypotension/dehydration treated with iv antibiotics and ivf; she was discharged 1/2/23 with po antibiotics she is currently on. She denies current symptoms including resolution of prior nausea vomiting and diarrhea fever and chills.  Lower blood pressure today improved on repeat she notes close to her prior baseline.  She denies lightheadedness dizziness vertigo but does feel off. .  She is continued on oral antibiotics currently. She denies bleeding  but does mention that during hospitalization with diarrhea had hemorrhoidal bleeding resolved since.  Weight loss with hospitalization but not prior. Last EGD/colo in 2016.     Review of Systems    ?   A comprehensive 14-point review of systems was reviewed with patient and was negative other than as specified above.   ?   PAST MEDICAL HISTORY:   Past Medical History:   Diagnosis Date    Arthritis     Cancer     cervical    Chronic pain     From osteoarthritis    Colitis     GERD (gastroesophageal reflux disease)     Seasonal allergies     ?     PAST SURGICAL HISTORY:   Past Surgical History:   Procedure Laterality Date    COLONOSCOPY N/A 09/21/2016    Procedure: COLONOSCOPY;  Surgeon: Hector Summers III, MD;  Location: [facility];  Service: Endoscopy;  Laterality: N/A;    EYE SURGERY      HYSTERECTOMY      29yrs old    JOINT REPLACEMENT  Both knees    KNEE SURGERY Left     KNEE  SURGERY Right     TONSILLECTOMY      TOTAL KNEE ARTHROPLASTY Bilateral 2019    TUBAL LIGATION        ?   ALLERGIES:   Allergies as of 01/18/2023    (No Known Allergies)      ?   MEDICATIONS:?   Outpatient Medications Marked as Taking for the 1/18/23 encounter (Office Visit) with Erendira Patel MD   Medication Sig Dispense Refill    albuterol (PROVENTIL HFA) 90 mcg/actuation inhaler Inhale 2 puffs into the lungs every 4 (four) hours as needed for Wheezing. Rescue 18 g 0    amoxicillin-clavulanate 875-125mg (AUGMENTIN) 875-125 mg per tablet Take 1 tablet by mouth every 12 (twelve) hours. 20 tablet 0    atorvastatin (LIPITOR) 20 MG tablet Take 20 mg by mouth.      azelastine (ASTELIN) 137 mcg (0.1 %) nasal spray 1 spray by Nasal route 2 (two) times daily.      FLUoxetine 20 MG capsule Take 1 capsule (20 mg total) by mouth once daily. 30 capsule 11    fluticasone propionate (FLONASE) 50 mcg/actuation nasal spray 2 sprays (100 mcg total) by Each Nostril route once daily. 16 g 4    loratadine (CLARITIN) 10 mg tablet Take 1 tablet (10 mg total) by mouth once daily. 30 tablet 11    ondansetron (ZOFRAN-ODT) 8 MG TbDL Take 8 mg by mouth.      pantoprazole (PROTONIX) 20 MG tablet Take 1 tablet (20 mg total) by mouth once daily. 90 tablet 3    potassium gluconate 595 (99) mg Tab Take 1 tablet by mouth Daily.        ?   SOCIAL HISTORY:?   Social History     Tobacco Use    Smoking status: Never     Passive exposure: Never    Smokeless tobacco: Never   Substance Use Topics    Alcohol use: No      ?      ?   FAMILY HISTORY:   family history includes Breast cancer in her sister; Cancer in her father and mother; Diabetes in her mother; Heart disease in her mother; Hyperlipidemia in her father; Hypertension in an other family member.   ?        Objective:      Physical Exam      ?   Vitals:    01/18/23 1416   BP: (!) 89/45   Pulse: 65   Temp: 97.1 °F (36.2 °C)      ?   ECOG:?1  General appearance: Generally well  appearing, in no acute distress.   Head, eyes, ears, nose, and throat: moist mucous membranes.   Respiratory:  Normal work of breathing  Abdomen: nontender, nondistended.   Extremities: Warm, without edema.   Neurologic: Alert and oriented.   Skin: No rashes, ecchymoses or petechial lesion.   Psychiatric:  Normal mood and affect.    ?   Laboratory:    Lab Results   Component Value Date    WBC 6.44 01/12/2023    RBC 2.74 (L) 01/12/2023    HGB 8.4 (L) 01/12/2023    HCT 26.9 (L) 01/12/2023    MCV 98 01/12/2023    MCH 30.7 01/12/2023    MCHC 31.2 (L) 01/12/2023    RDW 18.9 (H) 01/12/2023    PLT 74 (L) 01/12/2023    MPV 12.3 01/12/2023    GRAN 5.6 01/12/2023    GRAN 87.0 (H) 01/12/2023    LYMPH 0.4 (L) 01/12/2023    LYMPH 6.2 (L) 01/12/2023    MONO 0.2 (L) 01/12/2023    MONO 3.7 (L) 01/12/2023    EOS 0.2 01/12/2023    BASO 0.03 01/12/2023    EOSINOPHIL 2.3 01/12/2023    BASOPHIL 0.5 01/12/2023       Sodium   Date Value Ref Range Status   01/12/2023 136 136 - 145 mmol/L Final     Potassium   Date Value Ref Range Status   01/12/2023 4.3 3.5 - 5.1 mmol/L Final     Chloride   Date Value Ref Range Status   01/12/2023 102 95 - 110 mmol/L Final     CO2   Date Value Ref Range Status   01/12/2023 22 (L) 23 - 29 mmol/L Final     Glucose   Date Value Ref Range Status   01/12/2023 102 70 - 110 mg/dL Final     BUN   Date Value Ref Range Status   01/12/2023 10 8 - 23 mg/dL Final     Creatinine   Date Value Ref Range Status   01/12/2023 1.0 0.5 - 1.4 mg/dL Final     Calcium   Date Value Ref Range Status   01/12/2023 9.2 8.7 - 10.5 mg/dL Final     Total Protein   Date Value Ref Range Status   02/17/2022 7.2 6.0 - 8.4 g/dL Final     Albumin   Date Value Ref Range Status   02/17/2022 3.8 3.5 - 5.2 g/dL Final     Total Bilirubin   Date Value Ref Range Status   02/17/2022 0.6 0.1 - 1.0 mg/dL Final     Comment:     For infants and newborns, interpretation of results should be based  on gestational age, weight and in agreement with  clinical  observations.    Premature Infant recommended reference ranges:  Up to 24 hours.............<8.0 mg/dL  Up to 48 hours............<12.0 mg/dL  3-5 days..................<15.0 mg/dL  6-29 days.................<15.0 mg/dL       Alkaline Phosphatase   Date Value Ref Range Status   02/17/2022 59 55 - 135 U/L Final     AST   Date Value Ref Range Status   02/17/2022 13 10 - 40 U/L Final     ALT   Date Value Ref Range Status   02/17/2022 10 10 - 44 U/L Final     Anion Gap   Date Value Ref Range Status   01/12/2023 12 8 - 16 mmol/L Final     eGFR if    Date Value Ref Range Status   02/17/2022 57.6 (A) >60 mL/min/1.73 m^2 Final     eGFR if non    Date Value Ref Range Status   02/17/2022 49.9 (A) >60 mL/min/1.73 m^2 Final     Comment:     Calculation used to obtain the estimated glomerular filtration  rate (eGFR) is the CKD-EPI equation.          Folate   Date Value Ref Range Status   08/10/2007 8.1 5.4 - 24.0 ng/ml Final     TSH   Date Value Ref Range Status   09/15/2017 1.758 0.400 - 4.000 uIU/mL Final         Lab Results   Component Value Date    IRON 58 01/18/2023    TRANSFERRIN 209 01/18/2023    TIBC 309 01/18/2023    FESATURATED 19 (L) 01/18/2023        ?   Assessment/Plan:   Anemia, unspecified type  -     Ambulatory referral/consult to Hematology / Oncology       1. Anemia, unspecified type            Plan:     Problem List Items Addressed This Visit    None  Visit Diagnoses       Anemia, unspecified type              Labs on 01/12/2023 for which she is referred demonstrate anemia and thrombocytopenia from previously relatively normal hemoglobin 11 in February 2022 which was most recent prior.  She did have thrombocytopenia on that set of labs as well as on repeat in January 2023.  Hemorrhoidal bleeding while in hospital which has not recurred.  No other clinical evidence of bleeding concern.  Iron vitamin B12 folate TSH within normal limits.  Given time frame recent  hospitalization with infection/dehydration CBC abnormality may be related to anemia of inflammation and acute suppression in critical illness.  Repeat does show improvement in anemia and resolution of prior thrombocytopenia (also possible clumping effect).  Will get smear review and if without concerning findings would repeat CBC in more time from acute event to ensure resolution.  If non resolution further evaluation may be indicated.  Recommend follow-up with primary care regarding lower blood pressure and urgent attention if symptomatic/lower blood pressure.    Follow-Up:   Route Chart for Scheduling    Med Onc Chart Routing      Follow up with physician    Follow up with MILI 3 months.   Infusion scheduling note    Injection scheduling note    Labs CBC   Lab interval:  in 3 mo prior to appt   Imaging    Pharmacy appointment    Other referrals

## 2023-01-19 ENCOUNTER — OFFICE VISIT (OUTPATIENT)
Dept: INTERNAL MEDICINE | Facility: CLINIC | Age: 73
End: 2023-01-19
Payer: MEDICARE

## 2023-01-19 VITALS
DIASTOLIC BLOOD PRESSURE: 58 MMHG | BODY MASS INDEX: 36.64 KG/M2 | HEART RATE: 68 BPM | WEIGHT: 206.81 LBS | OXYGEN SATURATION: 98 % | TEMPERATURE: 98 F | HEIGHT: 63 IN | SYSTOLIC BLOOD PRESSURE: 98 MMHG

## 2023-01-19 DIAGNOSIS — Z12.31 SCREENING MAMMOGRAM, ENCOUNTER FOR: ICD-10-CM

## 2023-01-19 DIAGNOSIS — D64.9 ANEMIA, UNSPECIFIED TYPE: ICD-10-CM

## 2023-01-19 DIAGNOSIS — E66.01 SEVERE OBESITY (BMI 35.0-39.9) WITH COMORBIDITY: ICD-10-CM

## 2023-01-19 DIAGNOSIS — Z87.01 HISTORY OF PNEUMONIA: Primary | ICD-10-CM

## 2023-01-19 DIAGNOSIS — Z12.31 SCREENING MAMMOGRAM FOR BREAST CANCER: ICD-10-CM

## 2023-01-19 LAB
IRON SERPL-MCNC: 58 UG/DL (ref 30–160)
SATURATED IRON: 19 % (ref 20–50)
TOTAL IRON BINDING CAPACITY: 309 UG/DL (ref 250–450)
TRANSFERRIN SERPL-MCNC: 209 MG/DL (ref 200–375)
VIT B12 SERPL-MCNC: 456 PG/ML (ref 210–950)

## 2023-01-19 PROCEDURE — 1126F AMNT PAIN NOTED NONE PRSNT: CPT | Mod: CPTII,S$GLB,,

## 2023-01-19 PROCEDURE — 1159F PR MEDICATION LIST DOCUMENTED IN MEDICAL RECORD: ICD-10-PCS | Mod: CPTII,S$GLB,,

## 2023-01-19 PROCEDURE — 99214 OFFICE O/P EST MOD 30 MIN: CPT | Mod: S$GLB,,,

## 2023-01-19 PROCEDURE — 1101F PT FALLS ASSESS-DOCD LE1/YR: CPT | Mod: CPTII,S$GLB,,

## 2023-01-19 PROCEDURE — 3074F SYST BP LT 130 MM HG: CPT | Mod: CPTII,S$GLB,,

## 2023-01-19 PROCEDURE — 3074F PR MOST RECENT SYSTOLIC BLOOD PRESSURE < 130 MM HG: ICD-10-PCS | Mod: CPTII,S$GLB,,

## 2023-01-19 PROCEDURE — 1160F PR REVIEW ALL MEDS BY PRESCRIBER/CLIN PHARMACIST DOCUMENTED: ICD-10-PCS | Mod: CPTII,S$GLB,,

## 2023-01-19 PROCEDURE — 1160F RVW MEDS BY RX/DR IN RCRD: CPT | Mod: CPTII,S$GLB,,

## 2023-01-19 PROCEDURE — 99999 PR PBB SHADOW E&M-EST. PATIENT-LVL V: ICD-10-PCS | Mod: PBBFAC,,,

## 2023-01-19 PROCEDURE — 3078F DIAST BP <80 MM HG: CPT | Mod: CPTII,S$GLB,,

## 2023-01-19 PROCEDURE — 1101F PR PT FALLS ASSESS DOC 0-1 FALLS W/OUT INJ PAST YR: ICD-10-PCS | Mod: CPTII,S$GLB,,

## 2023-01-19 PROCEDURE — 3008F BODY MASS INDEX DOCD: CPT | Mod: CPTII,S$GLB,,

## 2023-01-19 PROCEDURE — 99214 PR OFFICE/OUTPT VISIT, EST, LEVL IV, 30-39 MIN: ICD-10-PCS | Mod: S$GLB,,,

## 2023-01-19 PROCEDURE — 3288F PR FALLS RISK ASSESSMENT DOCUMENTED: ICD-10-PCS | Mod: CPTII,S$GLB,,

## 2023-01-19 PROCEDURE — 3078F PR MOST RECENT DIASTOLIC BLOOD PRESSURE < 80 MM HG: ICD-10-PCS | Mod: CPTII,S$GLB,,

## 2023-01-19 PROCEDURE — 3288F FALL RISK ASSESSMENT DOCD: CPT | Mod: CPTII,S$GLB,,

## 2023-01-19 PROCEDURE — 99999 PR PBB SHADOW E&M-EST. PATIENT-LVL V: CPT | Mod: PBBFAC,,,

## 2023-01-19 PROCEDURE — 1159F MED LIST DOCD IN RCRD: CPT | Mod: CPTII,S$GLB,,

## 2023-01-19 PROCEDURE — 3008F PR BODY MASS INDEX (BMI) DOCUMENTED: ICD-10-PCS | Mod: CPTII,S$GLB,,

## 2023-01-19 PROCEDURE — 1126F PR PAIN SEVERITY QUANTIFIED, NO PAIN PRESENT: ICD-10-PCS | Mod: CPTII,S$GLB,,

## 2023-01-19 NOTE — PROGRESS NOTES
Dory Xie  01/19/2023  [id number]    Makenna Davey MD  Patient Care Team:  Makenna Davey MD as PCP - General (Family Medicine)  Kely Salazar MD as Consulting Physician (Physical Medicine and Rehabilitation)  Iliana Benoit LPN as Care Coordinator (Internal Medicine)  Sorin Mcmanus MD as Consulting Physician (Ophthalmology)          Visit Type:an urgent visit for a new problem    Chief Complaint:  Chief Complaint   Patient presents with    Follow-up       History of Present Illness:    Admitted to [facility] for Influenza A viral pneumonia and sepsis with JESSICA.  Her baseline cr 1.2, she was 1.4 at ER     Treated with antiviral, given fluids.  Did okay and was discharged home for followup  Tx with Azithromycin.  Lower BP at admit, responded to fluids  Was not discharged home on oxygen    Pt was seen in office on 1/12 for hospital follow up  Noted oxygen levels dropped while ambulating  Has coughing spells  Was given Augmentin and Medrol dose pack  Instructed to use albuterol as needed     WBC was WNL  Referred to hematology for anemia/thrombocytopenia   BMP showed kidney function improved to baseline     At visit today SOB/CONKLIN has improved   She is still taking Augmentin  Completed dose pack  Using inhaler every 4-6 hours PRN  She has not been wheezing at home     Saw hematology on yesterday  Platelet count increased to 301  Hemoglobin increased to 9.6  Was told to hold FE until all blood work is back    This morning feeling dizzy    History:  Past Medical History:   Diagnosis Date    Arthritis     Cancer     cervical    Chronic pain     From osteoarthritis    Colitis     GERD (gastroesophageal reflux disease)     Seasonal allergies      Past Surgical History:   Procedure Laterality Date    COLONOSCOPY N/A 09/21/2016    Procedure: COLONOSCOPY;  Surgeon: Hector Summers III, MD;  Location: [facility];  Service: Endoscopy;  Laterality: N/A;    EYE SURGERY      HYSTERECTOMY      29yrs old    JOINT  REPLACEMENT  Both knees    KNEE SURGERY Left     KNEE SURGERY Right     TONSILLECTOMY      TOTAL KNEE ARTHROPLASTY Bilateral 2019    TUBAL LIGATION       Family History   Problem Relation Age of Onset    Diabetes Mother     Heart disease Mother     Cancer Mother         lung    Cancer Father         Lung    Hyperlipidemia Father     Hypertension Other     Breast cancer Sister      Social History     Socioeconomic History    Marital status: Single   Tobacco Use    Smoking status: Never     Passive exposure: Never    Smokeless tobacco: Never   Substance and Sexual Activity    Alcohol use: No    Drug use: No     Patient Active Problem List   Diagnosis    Primary osteoarthritis of both knees    Chronic pain    Diet-controlled hyperlipidemia    Seasonal allergies    Osteoporosis    Family history of arteriosclerotic cardiovascular disease    Colitis    Vertigo    Major depressive disorder, single episode, moderate     Review of patient's allergies indicates:  No Known Allergies    The following were reviewed at this visit: active problem list, medication list, allergies, family history, social history, and health maintenance.    Medications:  Current Outpatient Medications on File Prior to Visit   Medication Sig Dispense Refill    albuterol (PROVENTIL HFA) 90 mcg/actuation inhaler Inhale 2 puffs into the lungs every 4 (four) hours as needed for Wheezing. Rescue 18 g 0    amoxicillin-clavulanate 875-125mg (AUGMENTIN) 875-125 mg per tablet Take 1 tablet by mouth every 12 (twelve) hours. 20 tablet 0    atorvastatin (LIPITOR) 20 MG tablet Take 20 mg by mouth.      azelastine (ASTELIN) 137 mcg (0.1 %) nasal spray 1 spray by Nasal route 2 (two) times daily.      FLUoxetine 20 MG capsule Take 1 capsule (20 mg total) by mouth once daily. 30 capsule 11    fluticasone propionate (FLONASE) 50 mcg/actuation nasal spray 2 sprays (100 mcg total) by Each Nostril route once daily. 16 g 4    loratadine (CLARITIN) 10 mg tablet Take 1  tablet (10 mg total) by mouth once daily. 30 tablet 11    ondansetron (ZOFRAN-ODT) 8 MG TbDL Take 8 mg by mouth.      pantoprazole (PROTONIX) 20 MG tablet Take 1 tablet (20 mg total) by mouth once daily. 90 tablet 3    potassium gluconate 595 (99) mg Tab Take 1 tablet by mouth Daily.       No current facility-administered medications on file prior to visit.       Medications have been reviewed and reconciled with patient at this visit.  Barriers to medications reviewed with patient.    Adverse reactions to current medications reviewed with patient..    Over the counter medications reviewed and reconciled with patient.    Exam:  Wt Readings from Last 3 Encounters:   01/19/23 93.8 kg (206 lb 12.7 oz)   01/18/23 94.4 kg (208 lb 1.8 oz)   01/12/23 95.9 kg (211 lb 6.7 oz)     Temp Readings from Last 3 Encounters:   01/19/23 98.4 °F (36.9 °C) (Tympanic)   01/18/23 97.1 °F (36.2 °C) (Temporal)   01/12/23 97.3 °F (36.3 °C)     BP Readings from Last 3 Encounters:   01/19/23 122/62   01/18/23 (!) 89/45   01/12/23 98/62     Pulse Readings from Last 3 Encounters:   01/19/23 68   01/18/23 65   01/12/23 104     Body mass index is 36.63 kg/m².      Review of Systems   Constitutional:  Positive for malaise/fatigue.   Respiratory:  Positive for cough and shortness of breath. Negative for wheezing.    Cardiovascular:  Negative for chest pain and palpitations.   Neurological:  Positive for dizziness.   Physical Exam  Vitals and nursing note reviewed.   Constitutional:       General: She is not in acute distress.     Appearance: She is well-developed. She is obese. She is not diaphoretic.   HENT:      Head: Normocephalic and atraumatic.      Right Ear: External ear normal.      Left Ear: External ear normal.   Eyes:      General:         Right eye: No discharge.         Left eye: No discharge.      Conjunctiva/sclera: Conjunctivae normal.      Pupils: Pupils are equal, round, and reactive to light.   Cardiovascular:      Rate and  Rhythm: Normal rate and regular rhythm.      Heart sounds: Normal heart sounds. No murmur heard.  Pulmonary:      Effort: Pulmonary effort is normal. No respiratory distress.      Breath sounds: Normal breath sounds. No wheezing.   Abdominal:      General: Bowel sounds are normal.   Neurological:      Mental Status: She is alert and oriented to person, place, and time.   Psychiatric:         Behavior: Behavior normal.         Thought Content: Thought content normal.         Judgment: Judgment normal.       Laboratory Reviewed ({Yes)  Lab Results   Component Value Date    WBC 6.96 01/18/2023    HGB 9.6 (L) 01/18/2023    HCT 33.2 (L) 01/18/2023     01/18/2023    CHOL 184 02/17/2022    TRIG 137 02/17/2022    HDL 32 (L) 02/17/2022    ALT 10 02/17/2022    AST 13 02/17/2022     01/12/2023    K 4.3 01/12/2023     01/12/2023    CREATININE 1.0 01/12/2023    BUN 10 01/12/2023    CO2 22 (L) 01/12/2023    TSH 1.758 09/15/2017    INR 1.1 09/10/2016    HGBA1C 5.4 02/19/2014     Dory was seen today for follow-up.    Diagnoses and all orders for this visit:    History of pneumonia  -     X-Ray Chest PA And Lateral; Future    Severe obesity (BMI 35.0-39.9) with comorbidity  Encouraged healthy diet and exercise as tolerated to help bring BMI into normal range.      Anemia, unspecified type  Pt had repeat CBC   Plt and H/H has improved     Screening mammogram for breast cancer  -     Mammo Digital Screening Bilat w/ Isaiah; Future      Pt states that she was feeling woozy at visit  Negative orthostatic BP  Recent CBC showed WBC WNL   Pt has not taken iron medication recently  Explained low H/H can cause dizziness   Discussed changing positions slowly     Pt has clinically improved  Will repeat  CXR on next week after completing antibiotic     Care Plan/Goals: Reviewed    Goals        Blood Pressure < 140/90      LDL CHOLESTEROL < 130      Weight < 190 lb (86.183 kg)             Follow up: No follow-ups on  file.    After visit summary was printed and given to patient upon discharge today.  Patient goals and care plan are included in After Visit Summary.

## 2023-01-20 ENCOUNTER — TELEPHONE (OUTPATIENT)
Dept: INTERNAL MEDICINE | Facility: CLINIC | Age: 73
End: 2023-01-20
Payer: MEDICARE

## 2023-01-20 NOTE — TELEPHONE ENCOUNTER
----- Message from Елена King sent at 1/20/2023 10:52 AM CST -----  Contact: Patient  Type:  Patient Returning Call    Who Called:Dory Xie   Who Left Message for Patient: no message   Does the patient know what this is regarding?: appointment   Would the patient rather a call back or a response via [facility]?  Call back   Best Call Back Number: [phone]   Additional Information:  pt states she's not sure who called, but if it was someone from this office please give her a call back.

## 2023-01-23 ENCOUNTER — TELEPHONE (OUTPATIENT)
Dept: HEMATOLOGY/ONCOLOGY | Facility: CLINIC | Age: 73
End: 2023-01-23
Payer: MEDICARE

## 2023-01-23 NOTE — TELEPHONE ENCOUNTER
----- Message from Anna Omer sent at 1/23/2023  3:20 PM CST -----  Contact: Dory  .Type:  Test Results    Who Called:   Name of Test (Lab/Mammo/Etc): non fasting labs   Date of Test: 01/18/2022  Ordering Provider: Jacky   [facility]   Would the patient rather a call back or a response via [facility]? call  Best Call Back Number:  [phone] ([place])    Additional Information:  The patient is requesting a callback for lab results please

## 2023-01-23 NOTE — TELEPHONE ENCOUNTER
Spoke with with Mrs Connors r/t her lab results per Mrs Connors she is unable to access results via [id number] She wanted to know if she could contact tech support. Tech support number given to pt

## 2023-01-23 NOTE — TELEPHONE ENCOUNTER
----- Message from Anna Omer sent at 1/23/2023  3:20 PM CST -----  Contact: Dory  .Type:  Test Results    Who Called:   Name of Test (Lab/Mammo/Etc): non fasting labs   Date of Test: 01/18/2022  Ordering Provider: Jacky   [facility]   Would the patient rather a call back or a response via [facility]? call  Best Call Back Number:  [phone] ([place])    Additional Information:  The patient is requesting a callback for lab results please

## 2023-01-24 ENCOUNTER — TELEPHONE (OUTPATIENT)
Dept: INTERNAL MEDICINE | Facility: CLINIC | Age: 73
End: 2023-01-24
Payer: MEDICARE

## 2023-01-24 ENCOUNTER — TELEPHONE (OUTPATIENT)
Dept: HEMATOLOGY/ONCOLOGY | Facility: CLINIC | Age: 73
End: 2023-01-24
Payer: MEDICARE

## 2023-01-24 NOTE — TELEPHONE ENCOUNTER
Called patient back and she is wanting to know what the next step is. She advises that hematology told her everything is better. She advises that she is still having syncopal episodes when getting up and down. She's been trying to build up her tolerance by getting up and down, working around the house. She is also still having frequent diarrhea even after the BRAT diet.     She also needs to know if she should take the iron again. The hematologist told her to stop it before the tests but never told her to resume it.

## 2023-01-24 NOTE — TELEPHONE ENCOUNTER
----- Message from Pricila De Oliveira sent at 1/24/2023  3:28 PM CST -----  Contact: Dory  Patient is calling to informed provider that patient labs from Hematology stated that patient labs are in line, but patient stated still having dizziness when getting up and Iron is still low. Please give a call back at [phone] as requested. Patient will like to know  the next plan of treatment.   Thanks  LR

## 2023-01-24 NOTE — TELEPHONE ENCOUNTER
Call returned to patient. Lab results and follow up appointment given to patient via phone call. All understanding verbalized.

## 2023-01-24 NOTE — TELEPHONE ENCOUNTER
----- Message from Cally Tejada MA sent at 1/24/2023  1:41 PM CST -----  Latisha Mckeon, this pt saw  on 01/18/2022, and she is the one that ordered the the labs the pt. is requesting the result, can you please reach out to the pt regarding her result?, and thanks in advance.  ----- Message -----  From: Amada Davey  Sent: 1/24/2023  12:36 PM CST  To: Kylee Beckford Staff    Pt is requesting a call back in regards to getting the results from her lab work last week. Pt states that she cant get on the portal to get her results. Pt states that she also want to know if she has something for her iron and stuff because she is still have the weak and dizziness. Pt can be reached at [phone] (home)

## 2023-01-25 NOTE — TELEPHONE ENCOUNTER
She is still anemic, but a little better than last week  This can happen when ill.  Then recovers with time.  Heme said to recheck it.   Her iron level is normal range, with lower saturation.  So taking iron is fine for now    Sh was to come back in for repeat Chest xray or CT to make sure the pneumonia resolved    She was to follow up, per my last notes on 1/12

## 2023-02-02 ENCOUNTER — HOSPITAL ENCOUNTER (OUTPATIENT)
Dept: RADIOLOGY | Facility: HOSPITAL | Age: 73
Discharge: HOME | End: 2023-02-02
Payer: MEDICARE

## 2023-02-02 DIAGNOSIS — Z87.01 HISTORY OF PNEUMONIA: ICD-10-CM

## 2023-02-02 DIAGNOSIS — Z87.01 HISTORY OF PNEUMONIA: Primary | ICD-10-CM

## 2023-02-02 DIAGNOSIS — R91.8 OPACITY OF LUNG ON IMAGING STUDY: ICD-10-CM

## 2023-02-02 PROCEDURE — 71046 X-RAY EXAM CHEST 2 VIEWS: CPT | Mod: TC,FY,PO

## 2023-02-02 PROCEDURE — 71046 XR CHEST PA AND LATERAL: ICD-10-PCS | Mod: 26,,, | Performed by: STUDENT IN AN ORGANIZED HEALTH CARE EDUCATION/TRAINING PROGRAM

## 2023-02-02 PROCEDURE — 71046 X-RAY EXAM CHEST 2 VIEWS: CPT | Mod: 26,,, | Performed by: STUDENT IN AN ORGANIZED HEALTH CARE EDUCATION/TRAINING PROGRAM

## 2023-02-03 DIAGNOSIS — F32.1 MAJOR DEPRESSIVE DISORDER, SINGLE EPISODE, MODERATE: ICD-10-CM

## 2023-02-03 DIAGNOSIS — K21.9 GASTROESOPHAGEAL REFLUX DISEASE: ICD-10-CM

## 2023-02-03 RX ORDER — PANTOPRAZOLE SODIUM 20 MG/1
20 TABLET, DELAYED RELEASE ORAL DAILY
Qty: 90 TABLET | Refills: 3 | Status: SHIPPED | OUTPATIENT
Start: 2023-02-03

## 2023-02-03 RX ORDER — ALBUTEROL SULFATE 90 UG/1
2 AEROSOL, METERED RESPIRATORY (INHALATION) EVERY 4 HOURS PRN
Qty: 18 G | Refills: 0 | Status: SHIPPED | OUTPATIENT
Start: 2023-02-03 | End: 2023-02-10 | Stop reason: SDUPTHER

## 2023-02-03 RX ORDER — FLUOXETINE HYDROCHLORIDE 20 MG/1
20 CAPSULE ORAL DAILY
Qty: 30 CAPSULE | Refills: 11 | Status: SHIPPED | OUTPATIENT
Start: 2023-02-03 | End: 2023-12-20 | Stop reason: SDUPTHER

## 2023-02-03 RX ORDER — PANTOPRAZOLE SODIUM 20 MG/1
TABLET, DELAYED RELEASE ORAL
Qty: 90 TABLET | Refills: 3 | OUTPATIENT
Start: 2023-02-03

## 2023-02-03 RX ORDER — FLUOXETINE HYDROCHLORIDE 20 MG/1
CAPSULE ORAL
Qty: 30 CAPSULE | Refills: 11 | OUTPATIENT
Start: 2023-02-03

## 2023-02-03 NOTE — TELEPHONE ENCOUNTER
Refill Decision Note   Dory Xie  is requesting a refill authorization.  Brief Assessment and Rationale for Refill:  Quick Discontinue     Medication Therapy Plan:  Receipt confirmed by pharmacy (2/3/2023  1:46 PM CST)    Medication Reconciliation Completed: No   Comments:     No Care Gaps recommended.     Note composed:3:23 PM 02/03/2023

## 2023-02-03 NOTE — TELEPHONE ENCOUNTER
No new care gaps identified.  [facility] Embedded Care Gaps. Reference number: [id number]. 2/03/2023   11:19:58 AM CST

## 2023-02-03 NOTE — TELEPHONE ENCOUNTER
No new care gaps identified.  [facility] Embedded Care Gaps. Reference number: [id number]. 2/03/2023   11:33:38 AM CST

## 2023-02-08 ENCOUNTER — HOSPITAL ENCOUNTER (OUTPATIENT)
Dept: RADIOLOGY | Facility: HOSPITAL | Age: 73
Discharge: HOME | End: 2023-02-08
Payer: MEDICARE

## 2023-02-08 DIAGNOSIS — Z87.01 HISTORY OF PNEUMONIA: ICD-10-CM

## 2023-02-08 DIAGNOSIS — R91.8 OPACITY OF LUNG ON IMAGING STUDY: Primary | ICD-10-CM

## 2023-02-08 DIAGNOSIS — R91.8 OPACITY OF LUNG ON IMAGING STUDY: ICD-10-CM

## 2023-02-08 PROCEDURE — 71250 CT THORAX DX C-: CPT | Mod: 26,,, | Performed by: RADIOLOGY

## 2023-02-08 PROCEDURE — 71250 CT CHEST WITHOUT CONTRAST: ICD-10-PCS | Mod: 26,,, | Performed by: RADIOLOGY

## 2023-02-08 PROCEDURE — 71250 CT THORAX DX C-: CPT | Mod: TC,PN

## 2023-02-10 ENCOUNTER — OFFICE VISIT (OUTPATIENT)
Dept: PULMONOLOGY | Facility: CLINIC | Age: 73
End: 2023-02-10
Payer: MEDICARE

## 2023-02-10 ENCOUNTER — HOSPITAL ENCOUNTER (OUTPATIENT)
Dept: CARDIOLOGY | Facility: HOSPITAL | Age: 73
Discharge: HOME | End: 2023-02-10
Attending: INTERNAL MEDICINE
Payer: MEDICARE

## 2023-02-10 VITALS
WEIGHT: 210.44 LBS | HEIGHT: 63 IN | SYSTOLIC BLOOD PRESSURE: 120 MMHG | HEART RATE: 102 BPM | DIASTOLIC BLOOD PRESSURE: 80 MMHG | BODY MASS INDEX: 37.29 KG/M2 | RESPIRATION RATE: 16 BRPM | OXYGEN SATURATION: 94 %

## 2023-02-10 DIAGNOSIS — I50.32 CHRONIC DIASTOLIC (CONGESTIVE) HEART FAILURE: ICD-10-CM

## 2023-02-10 DIAGNOSIS — R06.09 DOE (DYSPNEA ON EXERTION): ICD-10-CM

## 2023-02-10 DIAGNOSIS — R91.8 OPACITY OF LUNG ON IMAGING STUDY: ICD-10-CM

## 2023-02-10 DIAGNOSIS — J41.0 SIMPLE CHRONIC BRONCHITIS: ICD-10-CM

## 2023-02-10 DIAGNOSIS — R59.0 MEDIASTINAL LYMPHADENOPATHY: Primary | ICD-10-CM

## 2023-02-10 PROCEDURE — 1159F PR MEDICATION LIST DOCUMENTED IN MEDICAL RECORD: ICD-10-PCS | Mod: CPTII,S$GLB,, | Performed by: INTERNAL MEDICINE

## 2023-02-10 PROCEDURE — 99999 PR PBB SHADOW E&M-EST. PATIENT-LVL IV: CPT | Mod: PBBFAC,,, | Performed by: INTERNAL MEDICINE

## 2023-02-10 PROCEDURE — 1160F RVW MEDS BY RX/DR IN RCRD: CPT | Mod: CPTII,S$GLB,, | Performed by: INTERNAL MEDICINE

## 2023-02-10 PROCEDURE — 99205 OFFICE O/P NEW HI 60 MIN: CPT | Mod: S$GLB,,, | Performed by: INTERNAL MEDICINE

## 2023-02-10 PROCEDURE — 99999 PR PBB SHADOW E&M-EST. PATIENT-LVL IV: ICD-10-PCS | Mod: PBBFAC,,, | Performed by: INTERNAL MEDICINE

## 2023-02-10 PROCEDURE — 3079F DIAST BP 80-89 MM HG: CPT | Mod: CPTII,S$GLB,, | Performed by: INTERNAL MEDICINE

## 2023-02-10 PROCEDURE — 93005 ELECTROCARDIOGRAM TRACING: CPT

## 2023-02-10 PROCEDURE — 3008F PR BODY MASS INDEX (BMI) DOCUMENTED: ICD-10-PCS | Mod: CPTII,S$GLB,, | Performed by: INTERNAL MEDICINE

## 2023-02-10 PROCEDURE — 3079F PR MOST RECENT DIASTOLIC BLOOD PRESSURE 80-89 MM HG: ICD-10-PCS | Mod: CPTII,S$GLB,, | Performed by: INTERNAL MEDICINE

## 2023-02-10 PROCEDURE — 3074F SYST BP LT 130 MM HG: CPT | Mod: CPTII,S$GLB,, | Performed by: INTERNAL MEDICINE

## 2023-02-10 PROCEDURE — 93010 EKG 12-LEAD: ICD-10-PCS | Mod: ,,, | Performed by: INTERNAL MEDICINE

## 2023-02-10 PROCEDURE — 3288F PR FALLS RISK ASSESSMENT DOCUMENTED: ICD-10-PCS | Mod: CPTII,S$GLB,, | Performed by: INTERNAL MEDICINE

## 2023-02-10 PROCEDURE — 3074F PR MOST RECENT SYSTOLIC BLOOD PRESSURE < 130 MM HG: ICD-10-PCS | Mod: CPTII,S$GLB,, | Performed by: INTERNAL MEDICINE

## 2023-02-10 PROCEDURE — 3008F BODY MASS INDEX DOCD: CPT | Mod: CPTII,S$GLB,, | Performed by: INTERNAL MEDICINE

## 2023-02-10 PROCEDURE — 1101F PR PT FALLS ASSESS DOC 0-1 FALLS W/OUT INJ PAST YR: ICD-10-PCS | Mod: CPTII,S$GLB,, | Performed by: INTERNAL MEDICINE

## 2023-02-10 PROCEDURE — 1159F MED LIST DOCD IN RCRD: CPT | Mod: CPTII,S$GLB,, | Performed by: INTERNAL MEDICINE

## 2023-02-10 PROCEDURE — 3288F FALL RISK ASSESSMENT DOCD: CPT | Mod: CPTII,S$GLB,, | Performed by: INTERNAL MEDICINE

## 2023-02-10 PROCEDURE — 1160F PR REVIEW ALL MEDS BY PRESCRIBER/CLIN PHARMACIST DOCUMENTED: ICD-10-PCS | Mod: CPTII,S$GLB,, | Performed by: INTERNAL MEDICINE

## 2023-02-10 PROCEDURE — 93010 ELECTROCARDIOGRAM REPORT: CPT | Mod: ,,, | Performed by: INTERNAL MEDICINE

## 2023-02-10 PROCEDURE — 1101F PT FALLS ASSESS-DOCD LE1/YR: CPT | Mod: CPTII,S$GLB,, | Performed by: INTERNAL MEDICINE

## 2023-02-10 PROCEDURE — 99205 PR OFFICE/OUTPT VISIT, NEW, LEVL V, 60-74 MIN: ICD-10-PCS | Mod: S$GLB,,, | Performed by: INTERNAL MEDICINE

## 2023-02-10 RX ORDER — ALBUTEROL SULFATE 0.83 MG/ML
2.5 SOLUTION RESPIRATORY (INHALATION)
Qty: 360 ML | Refills: 11 | Status: SHIPPED | OUTPATIENT
Start: 2023-02-10 | End: 2023-12-20

## 2023-02-10 RX ORDER — ALBUTEROL SULFATE 90 UG/1
2 AEROSOL, METERED RESPIRATORY (INHALATION) EVERY 4 HOURS PRN
Qty: 18 G | Refills: 0 | Status: SHIPPED | OUTPATIENT
Start: 2023-02-10 | End: 2023-03-13

## 2023-02-10 NOTE — TELEPHONE ENCOUNTER
No new care gaps identified.  [facility] Embedded Care Gaps. Reference number: [id number]. 2/10/2023   11:30:00 AM CST

## 2023-02-10 NOTE — PATIENT INSTRUCTIONS
Result Notes  Details    Reading Physician Reading Date Result Priority   TALYADiane Yury Kruger Sr., MD  [phone] 2/8/2023 Routine     Narrative & Impression  EXAMINATION:  CT CHEST WITHOUT CONTRAST     CLINICAL HISTORY:  Pneumonia, unresolved; cervical cancer     TECHNIQUE:  Standard chest CT protocol was performed without IV contrast.     COMPARISON:  Comparison was made to a chest x-ray performed on 02/02/2023.     FINDINGS:  The size of heart is within normal limits.  There is a mild amount of atherosclerosis in the left main coronary and left anterior descending arteries.  There are multiple enlarged mediastinal lymph nodes.  One of the larger ones is adjacent to the left side of the distal portion of the trachea.  It has a short axis measurement of 16 mm.  There are areas of increased density scattered throughout both lungs.  One of the more worrisome ones is in the left upper lobe.  This area measures 50 mm in craniocaudal dimension by 63 mm in AP dimension by 32 mm in medial-lateral dimension.  There is a tiny left pleural effusion.  There is no pneumothorax.  The spleen is enlarged.  It measures 14.0 cm in length.  There is a 14 mm mass in the body of the right adrenal.  This mass has a Hounsfield measurement of 9.     Impression:     1. There are areas of increased density scattered throughout both lungs. One of the more worrisome ones is in the left upper lobe. This area measures 50 mm in craniocaudal dimension by 63 mm in AP dimension by 32 mm in medial-lateral dimension.  If clinically indicated, I recommend consideration of bronchoscopy.  2. There are multiple enlarged mediastinal lymph nodes.  One of the larger ones is adjacent to the left side of the distal portion of the trachea.  It has a short axis measurement of 16 mm.  3. There is a tiny left pleural effusion.  4. The spleen is enlarged. It measures 14.0 cm in length.  5. There is a 14 mm mass in the body of the right adrenal. This mass has a  Hounsfield measurement of 9.  This is characteristic of an adrenal adenoma.  6. There is a mild amount of atherosclerosis in the left main coronary and left anterior descending arteries.  All CT scans at this facility use dose modulation, iterative reconstruction, and/or weight base dosing when appropriate to reduce radiation dose when appropriate to reduce radiation dose to as low as reasonably achievable.        Electronically signed by: Roscoe Kruger MD  Date:                                            02/08/2023  Time:                                           10:36

## 2023-02-10 NOTE — PROGRESS NOTES
Subjective:     Patient ID: Dory Xie is a 73 y.o. female.    Chief Complaint:  Slow to resolve pneumonia     HPI 72 y/o nonsmoker with history of pneumonia 2022  Hospitalized at [facility] 2022 for pneumonia - records not available.  First episode of pneumonia  No prior history of asthma  Nonsmoker  Dyspnea  Patient complains of shortness of breath. Symptoms occur while getting dressed. Symptoms began 6 weeks ago, unchanged since. Associated symptoms include  difficulty breathing, dry cough, dyspnea on exertion, post nasal drip, and productive cough. She denies chest pain, located left chest. She does not have had recent travel. Weight has decreased 22 pounds over last few months . Symptoms are exacerbated by any exercise. Symptoms are alleviated by rest.     Both parents  of lung cancer - small cell  Sister with lymphoma    Past Medical History:   Diagnosis Date    Arthritis     Cancer     cervical    Chronic diastolic (congestive) heart failure 2/10/2023    Chronic pain     From osteoarthritis    Colitis     GERD (gastroesophageal reflux disease)     Seasonal allergies      Past Surgical History:   Procedure Laterality Date    COLONOSCOPY N/A 2016    Procedure: COLONOSCOPY;  Surgeon: Hector Summers III, MD;  Location: [facility];  Service: Endoscopy;  Laterality: N/A;    EYE SURGERY      HYSTERECTOMY      29yrs old    JOINT REPLACEMENT  Both knees    KNEE SURGERY Left     KNEE SURGERY Right     TONSILLECTOMY      TOTAL KNEE ARTHROPLASTY Bilateral 2019    TUBAL LIGATION       Review of patient's allergies indicates:  No Known Allergies  Current Outpatient Medications on File Prior to Visit   Medication Sig Dispense Refill    atorvastatin (LIPITOR) 20 MG tablet Take 20 mg by mouth.      azelastine (ASTELIN) 137 mcg (0.1 %) nasal spray 1 spray by Nasal route 2 (two) times daily.      FLUoxetine 20 MG capsule Take 1 capsule (20 mg total) by mouth once daily. 30 capsule 11     fluticasone propionate (FLONASE) 50 mcg/actuation nasal spray 2 sprays (100 mcg total) by Each Nostril route once daily. 16 g 4    ondansetron (ZOFRAN-ODT) 8 MG TbDL Take 8 mg by mouth.      pantoprazole (PROTONIX) 20 MG tablet Take 1 tablet (20 mg total) by mouth once daily. 90 tablet 3    potassium gluconate 595 (99) mg Tab Take 1 tablet by mouth Daily.      amoxicillin-clavulanate 875-125mg (AUGMENTIN) 875-125 mg per tablet Take 1 tablet by mouth every 12 (twelve) hours. (Patient not taking: Reported on 2/10/2023) 20 tablet 0    loratadine (CLARITIN) 10 mg tablet Take 1 tablet (10 mg total) by mouth once daily. 30 tablet 11    [DISCONTINUED] albuterol (PROVENTIL HFA) 90 mcg/actuation inhaler Inhale 2 puffs into the lungs every 4 (four) hours as needed for Wheezing. Rescue 18 g 0     No current facility-administered medications on file prior to visit.     Social History     Socioeconomic History    Marital status: Single   Tobacco Use    Smoking status: Never     Passive exposure: Never    Smokeless tobacco: Never   Substance and Sexual Activity    Alcohol use: No    Drug use: No     Family History   Problem Relation Age of Onset    Diabetes Mother     Heart disease Mother     Cancer Mother         lung    Cancer Father         Lung    Hyperlipidemia Father     Hypertension Other     Breast cancer Sister        Review of Systems   Constitutional:  Positive for fatigue. Negative for fever.   HENT:  Positive for postnasal drip, rhinorrhea and congestion.    Eyes:  Negative for redness and itching.   Respiratory:  Positive for cough, sputum production, shortness of breath, dyspnea on extertion, use of rescue inhaler and Paroxysmal Nocturnal Dyspnea.    Cardiovascular:  Negative for chest pain, palpitations and leg swelling.   Genitourinary:  Negative for difficulty urinating and hematuria.   Endocrine:  Negative for cold intolerance and heat intolerance.    Musculoskeletal:  Positive for arthralgias.   Skin:   "Negative for rash.   Gastrointestinal:  Negative for nausea and abdominal pain.   Neurological:  Negative for dizziness, syncope, weakness and light-headedness.   Hematological:  Negative for adenopathy. Does not bruise/bleed easily.   Psychiatric/Behavioral:  Negative for sleep disturbance. The patient is not nervous/anxious.      Objective:      /80   Pulse 102   Resp 16   Ht 5' 3" (1.6 m)   Wt 95.5 kg (210 lb 6.9 oz)   SpO2 (!) 94%   BMI 37.28 kg/m²   Physical Exam  Vitals and nursing note reviewed.   Constitutional:       Appearance: She is well-developed. She is obese.   HENT:      Head: Normocephalic and atraumatic.      Nose: Nose normal.      Mouth/Throat:      Pharynx: No oropharyngeal exudate.   Eyes:      Conjunctiva/sclera: Conjunctivae normal.      Pupils: Pupils are equal, round, and reactive to light.   Neck:      Thyroid: No thyromegaly.      Vascular: No JVD.      Trachea: No tracheal deviation.   Cardiovascular:      Rate and Rhythm: Normal rate and regular rhythm.      Heart sounds: Normal heart sounds.   Pulmonary:      Effort: Pulmonary effort is normal. No respiratory distress.      Breath sounds: Examination of the right-lower field reveals wheezing. Examination of the left-lower field reveals wheezing. Decreased breath sounds and wheezing present. No rhonchi or rales.   Chest:      Chest wall: No tenderness.   Abdominal:      General: Bowel sounds are normal.      Palpations: Abdomen is soft.   Musculoskeletal:         General: Normal range of motion.      Cervical back: Neck supple.   Lymphadenopathy:      Cervical: No cervical adenopathy.   Skin:     General: Skin is warm and dry.   Neurological:      Mental Status: She is alert and oriented to person, place, and time.     Personal Diagnostic Review  CT reviewed    Pulmonary Studies Review 2/10/2023   SpO2 94   Height 63   Weight 3366.87   BMI (Calculated) 37.3   Predicted Distance 213.83   Predicted Distance Meters (Calculated) " 358.3       CT Chest Without Contrast  Narrative: EXAMINATION:  CT CHEST WITHOUT CONTRAST    CLINICAL HISTORY:  Pneumonia, unresolved; cervical cancer    TECHNIQUE:  Standard chest CT protocol was performed without IV contrast.    COMPARISON:  Comparison was made to a chest x-ray performed on 02/02/2023.    FINDINGS:  The size of heart is within normal limits.  There is a mild amount of atherosclerosis in the left main coronary and left anterior descending arteries.  There are multiple enlarged mediastinal lymph nodes.  One of the larger ones is adjacent to the left side of the distal portion of the trachea.  It has a short axis measurement of 16 mm.  There are areas of increased density scattered throughout both lungs.  One of the more worrisome ones is in the left upper lobe.  This area measures 50 mm in craniocaudal dimension by 63 mm in AP dimension by 32 mm in medial-lateral dimension.  There is a tiny left pleural effusion.  There is no pneumothorax.  The spleen is enlarged.  It measures 14.0 cm in length.  There is a 14 mm mass in the body of the right adrenal.  This mass has a Hounsfield measurement of 9.  Impression: 1. There are areas of increased density scattered throughout both lungs. One of the more worrisome ones is in the left upper lobe. This area measures 50 mm in craniocaudal dimension by 63 mm in AP dimension by 32 mm in medial-lateral dimension.  If clinically indicated, I recommend consideration of bronchoscopy.  2. There are multiple enlarged mediastinal lymph nodes.  One of the larger ones is adjacent to the left side of the distal portion of the trachea.  It has a short axis measurement of 16 mm.  3. There is a tiny left pleural effusion.  4. The spleen is enlarged. It measures 14.0 cm in length.  5. There is a 14 mm mass in the body of the right adrenal. This mass has a Hounsfield measurement of 9.  This is characteristic of an adrenal adenoma.  6. There is a mild amount of atherosclerosis  in the left main coronary and left anterior descending arteries.  All CT scans at this facility use dose modulation, iterative reconstruction, and/or weight base dosing when appropriate to reduce radiation dose when appropriate to reduce radiation dose to as low as reasonably achievable.    Electronically signed by: Roscoe Kruger MD  Date:    02/08/2023  Time:    10:36      Office Spirometry Results:     No flowsheet data found.  Pulmonary Studies Review 2/10/2023   SpO2 94   Height 63   Weight 3366.87   BMI (Calculated) 37.3   Predicted Distance 213.83   Predicted Distance Meters (Calculated) 358.3         Assessment:       Mediastinal lymphadenopathy  PET followed by biopsy    Mediastinal lymphadenopathy  -     NM PET CT Routine Skull to Mid Thigh; Future; Expected date: 02/10/2023  -     Comprehensive Metabolic Panel; Future; Expected date: 02/10/2023  -     Angiotensin Converting Enzyme; Future; Expected date: 02/10/2023  -     QUANTIFERON GOLD TB; Future; Expected date: 02/10/2023    Opacity of lung on imaging study  -     Ambulatory referral/consult to Pulmonology  -     NM PET CT Routine Skull to Mid Thigh; Future; Expected date: 02/10/2023    Simple chronic bronchitis  -     albuterol (PROVENTIL) 2.5 mg /3 mL (0.083 %) nebulizer solution; Take 3 mLs (2.5 mg total) by nebulization every 4 to 6 hours as needed for Wheezing or Shortness of Breath.  Dispense: 360 mL; Refill: 11  -     NEBULIZER KIT (SUPPLIES) FOR HOME USE  -     NEBULIZER FOR HOME USE  -     Complete PFT with bronchodilator; Future; Expected date: 02/10/2023    CONKLIN (dyspnea on exertion)  -     Echo Saline Bubble? No; Future; Expected date: 02/10/2023  -     EKG 12-lead; Future    Chronic diastolic (congestive) heart failure          Outpatient Encounter Medications as of 2/10/2023   Medication Sig Dispense Refill    albuterol (PROVENTIL HFA) 90 mcg/actuation inhaler Inhale 2 puffs into the lungs every 4 (four) hours as needed for Wheezing.  Rescue 18 g 0    atorvastatin (LIPITOR) 20 MG tablet Take 20 mg by mouth.      azelastine (ASTELIN) 137 mcg (0.1 %) nasal spray 1 spray by Nasal route 2 (two) times daily.      FLUoxetine 20 MG capsule Take 1 capsule (20 mg total) by mouth once daily. 30 capsule 11    fluticasone propionate (FLONASE) 50 mcg/actuation nasal spray 2 sprays (100 mcg total) by Each Nostril route once daily. 16 g 4    ondansetron (ZOFRAN-ODT) 8 MG TbDL Take 8 mg by mouth.      pantoprazole (PROTONIX) 20 MG tablet Take 1 tablet (20 mg total) by mouth once daily. 90 tablet 3    potassium gluconate 595 (99) mg Tab Take 1 tablet by mouth Daily.      albuterol (PROVENTIL) 2.5 mg /3 mL (0.083 %) nebulizer solution Take 3 mLs (2.5 mg total) by nebulization every 4 to 6 hours as needed for Wheezing or Shortness of Breath. 360 mL 11    amoxicillin-clavulanate 875-125mg (AUGMENTIN) 875-125 mg per tablet Take 1 tablet by mouth every 12 (twelve) hours. (Patient not taking: Reported on 2/10/2023) 20 tablet 0    loratadine (CLARITIN) 10 mg tablet Take 1 tablet (10 mg total) by mouth once daily. 30 tablet 11    [DISCONTINUED] albuterol (PROVENTIL HFA) 90 mcg/actuation inhaler Inhale 2 puffs into the lungs every 4 (four) hours as needed for Wheezing. Rescue 18 g 0     No facility-administered encounter medications on file as of 2/10/2023.     Plan:       Requested Prescriptions     Signed Prescriptions Disp Refills    albuterol (PROVENTIL) 2.5 mg /3 mL (0.083 %) nebulizer solution 360 mL 11     Sig: Take 3 mLs (2.5 mg total) by nebulization every 4 to 6 hours as needed for Wheezing or Shortness of Breath.     Problem List Items Addressed This Visit       Chronic diastolic (congestive) heart failure    Mediastinal lymphadenopathy - Primary    Current Assessment & Plan     PET followed by biopsy         Relevant Orders    NM PET CT Routine Skull to Mid Thigh    Comprehensive Metabolic Panel    Angiotensin Converting Enzyme    QUANTIFERON GOLD TB     Other  Visit Diagnoses       Opacity of lung on imaging study        Relevant Orders    NM PET CT Routine Skull to Mid Thigh    Simple chronic bronchitis        Relevant Medications    albuterol (PROVENTIL) 2.5 mg /3 mL (0.083 %) nebulizer solution    Other Relevant Orders    NEBULIZER KIT (SUPPLIES) FOR HOME USE    NEBULIZER FOR HOME USE    Complete PFT with bronchodilator    CONKLIN (dyspnea on exertion)        Relevant Orders    Echo Saline Bubble? No    EKG 12-lead               Follow up in about 1 week (around 2/17/2023) for Review progress, Review CT/PET - on return visit.    MEDICAL DECISION MAKING: Moderate to high complexity.  Overall, the multiple problems listed are of moderate to high severity that may impact quality of life and activities of daily living. Side effects of medications, treatment plan as well as options and alternatives reviewed and discussed with patient. There was counseling of patient concerning these issues.    Total time spent in counseling and coordination of care - 60  minutes of total time spent on the encounter, which includes face to face time and non-face to face time preparing to see the patient (eg, review of tests), Obtaining and/or reviewing separately obtained history, Documenting clinical information in the electronic or other health record, Independently interpreting results (not separately reported) and communicating results to the patient/family/caregiver, or Care coordination (not separately reported).    Time was used in discussion of prognosis, risks, benefits of treatment, instructions and compliance with regimen . Discussion with other physicians and/or health care providers - home health or for use of durable medical equipment (oxygen, nebulizers, CPAP, BiPAP) occurred.

## 2023-02-10 NOTE — TELEPHONE ENCOUNTER
----- Message from Leon Lovell sent at 2/10/2023 11:22 AM CST -----  Contact: Dory  Type:  RX Refill Request    Who Called: doyr  Refill or New Rx:refill  RX Name and Strength:albuterol (PROVENTIL HFA) 90 mcg/actuation inhaler  How is the patient currently taking it? (ex. 1XDay):4xday  Is this a 30 day or 90 day RX:90day  Preferred Pharmacy with phone number:  [facility] - [address] [facility] [address] [phone] [address]  [place] [address]  Phone: [phone] Fax: [phone]  Local or Mail Order:local  Ordering Provider:   Would the patient rather a call back or a response via [facility]? Call back   Best Call Back Number:[phone]  Additional Information:   Thanks   Am

## 2023-02-15 ENCOUNTER — LAB VISIT (OUTPATIENT)
Dept: LAB | Facility: HOSPITAL | Age: 73
End: 2023-02-15
Attending: INTERNAL MEDICINE
Payer: MEDICARE

## 2023-02-15 DIAGNOSIS — R59.0 MEDIASTINAL LYMPHADENOPATHY: ICD-10-CM

## 2023-02-15 LAB
ALBUMIN SERPL BCP-MCNC: 3.4 G/DL (ref 3.5–5.2)
ALP SERPL-CCNC: 63 U/L (ref 55–135)
ALT SERPL W/O P-5'-P-CCNC: 7 U/L (ref 10–44)
ANION GAP SERPL CALC-SCNC: 9 MMOL/L (ref 8–16)
AST SERPL-CCNC: 13 U/L (ref 10–40)
BILIRUB SERPL-MCNC: 0.8 MG/DL (ref 0.1–1)
BUN SERPL-MCNC: 12 MG/DL (ref 8–23)
CALCIUM SERPL-MCNC: 9.3 MG/DL (ref 8.7–10.5)
CHLORIDE SERPL-SCNC: 109 MMOL/L (ref 95–110)
CO2 SERPL-SCNC: 25 MMOL/L (ref 23–29)
CREAT SERPL-MCNC: 1.1 MG/DL (ref 0.5–1.4)
EST. GFR  (NO RACE VARIABLE): 52.7 ML/MIN/1.73 M^2
GLUCOSE SERPL-MCNC: 115 MG/DL (ref 70–110)
POTASSIUM SERPL-SCNC: 4 MMOL/L (ref 3.5–5.1)
PROT SERPL-MCNC: 6.7 G/DL (ref 6–8.4)
SODIUM SERPL-SCNC: 143 MMOL/L (ref 136–145)

## 2023-02-15 PROCEDURE — 86480 TB TEST CELL IMMUN MEASURE: CPT | Performed by: INTERNAL MEDICINE

## 2023-02-15 PROCEDURE — 80053 COMPREHEN METABOLIC PANEL: CPT | Performed by: INTERNAL MEDICINE

## 2023-02-15 PROCEDURE — 36415 COLL VENOUS BLD VENIPUNCTURE: CPT | Mod: PN | Performed by: INTERNAL MEDICINE

## 2023-02-15 PROCEDURE — 82164 ANGIOTENSIN I ENZYME TEST: CPT | Performed by: INTERNAL MEDICINE

## 2023-02-16 LAB
ACE SERPL-CCNC: 46 U/L (ref 16–85)
GAMMA INTERFERON BACKGROUND BLD IA-ACNC: 0.04 IU/ML
M TB IFN-G CD4+ BCKGRND COR BLD-ACNC: 0 IU/ML
MITOGEN IGNF BCKGRD COR BLD-ACNC: 0.05 IU/ML
TB GOLD PLUS: ABNORMAL
TB2 - NIL: 0.01 IU/ML

## 2023-02-20 ENCOUNTER — PATIENT MESSAGE (OUTPATIENT)
Dept: INTERNAL MEDICINE | Facility: CLINIC | Age: 73
End: 2023-02-20
Payer: MEDICARE

## 2023-02-20 RX ORDER — LORATADINE 10 MG/1
10 TABLET ORAL DAILY
Qty: 30 TABLET | Refills: 11 | Status: SHIPPED | OUTPATIENT
Start: 2023-02-20 | End: 2024-02-20

## 2023-02-20 NOTE — TELEPHONE ENCOUNTER
No new care gaps identified.  [facility] Embedded Care Gaps. Reference number: [id number]. 2/20/2023   10:23:50 AM CST

## 2023-02-21 ENCOUNTER — HOSPITAL ENCOUNTER (OUTPATIENT)
Dept: RADIOLOGY | Facility: HOSPITAL | Age: 73
Discharge: HOME | End: 2023-02-21
Payer: MEDICARE

## 2023-02-21 DIAGNOSIS — Z12.31 SCREENING MAMMOGRAM FOR BREAST CANCER: ICD-10-CM

## 2023-02-21 PROCEDURE — 77067 MAMMO DIGITAL SCREENING BILAT WITH TOMO: ICD-10-PCS | Mod: 26,,, | Performed by: RADIOLOGY

## 2023-02-21 PROCEDURE — 77063 BREAST TOMOSYNTHESIS BI: CPT | Mod: 26,,, | Performed by: RADIOLOGY

## 2023-02-21 PROCEDURE — 77067 SCR MAMMO BI INCL CAD: CPT | Mod: 26,,, | Performed by: RADIOLOGY

## 2023-02-21 PROCEDURE — 77067 SCR MAMMO BI INCL CAD: CPT | Mod: TC,PO

## 2023-02-21 PROCEDURE — 77063 MAMMO DIGITAL SCREENING BILAT WITH TOMO: ICD-10-PCS | Mod: 26,,, | Performed by: RADIOLOGY

## 2023-02-22 ENCOUNTER — HOSPITAL ENCOUNTER (OUTPATIENT)
Dept: RADIOLOGY | Facility: HOSPITAL | Age: 73
Discharge: HOME | End: 2023-02-22
Attending: INTERNAL MEDICINE
Payer: MEDICARE

## 2023-02-22 DIAGNOSIS — R59.0 MEDIASTINAL LYMPHADENOPATHY: ICD-10-CM

## 2023-02-22 DIAGNOSIS — R91.8 OPACITY OF LUNG ON IMAGING STUDY: ICD-10-CM

## 2023-02-22 PROCEDURE — 78815 PET IMAGE W/CT SKULL-THIGH: CPT | Mod: 26,PI,, | Performed by: RADIOLOGY

## 2023-02-22 PROCEDURE — 78815 NM PET CT ROUTINE: ICD-10-PCS | Mod: 26,PI,, | Performed by: RADIOLOGY

## 2023-02-22 PROCEDURE — A9552 F18 FDG: HCPCS

## 2023-02-23 ENCOUNTER — HOSPITAL ENCOUNTER (OUTPATIENT)
Dept: CARDIOLOGY | Facility: HOSPITAL | Age: 73
Discharge: HOME | End: 2023-02-23
Attending: INTERNAL MEDICINE
Payer: MEDICARE

## 2023-02-23 ENCOUNTER — CLINICAL SUPPORT (OUTPATIENT)
Dept: PULMONOLOGY | Facility: CLINIC | Age: 73
End: 2023-02-23
Payer: MEDICARE

## 2023-02-23 VITALS
SYSTOLIC BLOOD PRESSURE: 120 MMHG | BODY MASS INDEX: 37.21 KG/M2 | HEIGHT: 63 IN | WEIGHT: 210 LBS | HEART RATE: 80 BPM | DIASTOLIC BLOOD PRESSURE: 80 MMHG

## 2023-02-23 DIAGNOSIS — R06.09 DOE (DYSPNEA ON EXERTION): ICD-10-CM

## 2023-02-23 DIAGNOSIS — J41.0 SIMPLE CHRONIC BRONCHITIS: ICD-10-CM

## 2023-02-23 LAB
AORTIC ROOT ANNULUS: 3.23 CM
AV INDEX (PROSTH): 0.63
AV MEAN GRADIENT: 10 MMHG
AV PEAK GRADIENT: 17 MMHG
AV VALVE AREA: 1.92 CM2
AV VELOCITY RATIO: 0.62
BRPFT: ABNORMAL
BSA FOR ECHO PROCEDURE: 2.06 M2
CV ECHO LV RWT: 0.45 CM
DLCO ADJ PRE: 7.25 ML/(MIN*MMHG) (ref 14.34–25.81)
DLCO SINGLE BREATH LLN: 14.34
DLCO SINGLE BREATH PRE REF: 36.1 %
DLCO SINGLE BREATH REF: 20.07
DLCOC SBVA LLN: 2.72
DLCOC SBVA PRE REF: 89 %
DLCOC SBVA REF: 4.21
DLCOC SINGLE BREATH LLN: 14.34
DLCOC SINGLE BREATH PRE REF: 36.1 %
DLCOC SINGLE BREATH REF: 20.07
DLCOVA LLN: 2.72
DLCOVA PRE REF: 89 %
DLCOVA PRE: 3.75 ML/(MIN*MMHG*L) (ref 2.72–5.69)
DLCOVA REF: 4.21
DLVAADJ PRE: 3.75 ML/(MIN*MMHG*L) (ref 2.72–5.69)
DOP CALC AO PEAK VEL: 2.08 M/S
DOP CALC AO VTI: 42.2 CM
DOP CALC LVOT AREA: 3 CM2
DOP CALC LVOT DIAMETER: 1.97 CM
DOP CALC LVOT PEAK VEL: 1.29 M/S
DOP CALC LVOT STROKE VOLUME: 81.04 CM3
DOP CALC RVOT PEAK VEL: 1.07 M/S
DOP CALC RVOT VTI: 22.9 CM
DOP CALCLVOT PEAK VEL VTI: 26.6 CM
E WAVE DECELERATION TIME: 272.33 MSEC
E/A RATIO: 0.82
E/E' RATIO: 7.3 M/S
ECHO LV POSTERIOR WALL: 0.85 CM (ref 0.6–1.1)
EJECTION FRACTION: 65 %
ERV LLN: -16449.42
ERV PRE REF: 31.7 %
ERV REF: 0.58
FEF 25 75 CHG: 24 %
FEF 25 75 LLN: 0.75
FEF 25 75 POST REF: 87.6 %
FEF 25 75 PRE REF: 70.7 %
FEF 25 75 REF: 1.69
FET100 CHG: -5.7 %
FEV1 CHG: 13.2 %
FEV1 FVC CHG: 0.8 %
FEV1 FVC LLN: 64
FEV1 FVC POST REF: 104.6 %
FEV1 FVC PRE REF: 103.8 %
FEV1 FVC REF: 78
FEV1 LLN: 1.45
FEV1 POST REF: 72.1 %
FEV1 PRE REF: 63.7 %
FEV1 REF: 2.02
FRACTIONAL SHORTENING: 37 % (ref 28–44)
FRCPLETH LLN: 1.84
FRCPLETH PREREF: 36.8 %
FRCPLETH REF: 2.66
FVC CHG: 12.4 %
FVC LLN: 1.88
FVC POST REF: 68.3 %
FVC PRE REF: 60.8 %
FVC REF: 2.62
INTERVENTRICULAR SEPTUM: 1.34 CM (ref 0.6–1.1)
IVC PRE: 1.17 L (ref 1.88–3.36)
IVC SINGLE BREATH LLN: 1.88
IVC SINGLE BREATH PRE REF: 44.6 %
IVC SINGLE BREATH REF: 2.62
IVRT: 105.61 MSEC
LA MAJOR: 4.3 CM
LA MINOR: 4.76 CM
LA WIDTH: 4 CM
LEFT ATRIUM SIZE: 3.88 CM
LEFT ATRIUM VOLUME INDEX MOD: 23.8 ML/M2
LEFT ATRIUM VOLUME INDEX: 30.3 ML/M2
LEFT ATRIUM VOLUME MOD: 46.87 CM3
LEFT ATRIUM VOLUME: 59.61 CM3
LEFT INTERNAL DIMENSION IN SYSTOLE: 2.36 CM (ref 2.1–4)
LEFT VENTRICLE DIASTOLIC VOLUME INDEX: 30.86 ML/M2
LEFT VENTRICLE DIASTOLIC VOLUME: 60.8 ML
LEFT VENTRICLE MASS INDEX: 67 G/M2
LEFT VENTRICLE SYSTOLIC VOLUME INDEX: 9.9 ML/M2
LEFT VENTRICLE SYSTOLIC VOLUME: 19.42 ML
LEFT VENTRICULAR INTERNAL DIMENSION IN DIASTOLE: 3.77 CM (ref 3.5–6)
LEFT VENTRICULAR MASS: 132.16 G
LV LATERAL E/E' RATIO: 7.3 M/S
LV SEPTAL E/E' RATIO: 7.3 M/S
LVOT MG: 3.69 MMHG
LVOT MV: 0.91 CM/S
MV PEAK A VEL: 0.89 M/S
MV PEAK E VEL: 0.73 M/S
MV STENOSIS PRESSURE HALF TIME: 78.97 MS
MV VALVE AREA P 1/2 METHOD: 2.79 CM2
MVV LLN: 64
MVV PRE REF: 69.5 %
MVV REF: 75
PEF CHG: 33.3 %
PEF LLN: 3.52
PEF POST REF: 63 %
PEF PRE REF: 47.3 %
PEF REF: 5.18
PISA TR MAX VEL: 2.18 M/S
POST FEF 25 75: 1.48 L/S (ref 0.75–2.63)
POST FET 100: 7.11 SEC
POST FEV1 FVC: 81.36 % (ref 63.91–91.63)
POST FEV1: 1.46 L (ref 1.45–2.59)
POST FVC: 1.79 L (ref 1.88–3.36)
POST PEF: 3.26 L/S (ref 3.52–6.83)
PRE DLCO: 7.25 ML/(MIN*MMHG) (ref 14.34–25.81)
PRE ERV: 0.18 L (ref -16449.42–16450.58)
PRE FEF 25 75: 1.2 L/S (ref 0.75–2.63)
PRE FET 100: 7.54 SEC
PRE FEV1 FVC: 80.75 % (ref 63.91–91.63)
PRE FEV1: 1.29 L (ref 1.45–2.59)
PRE FRC PL: 0.98 L
PRE FVC: 1.59 L (ref 1.88–3.36)
PRE MVV: 52 L/MIN (ref 63.58–86.02)
PRE PEF: 2.45 L/S (ref 3.52–6.83)
PRE RV: 0.61 L (ref 1.5–2.66)
PRE TLC: 2.48 L (ref 3.78–5.76)
PULM VEIN S/D RATIO: 1.9
PV MEAN GRADIENT: 2.35 MMHG
PV PEAK D VEL: 0.41 M/S
PV PEAK S VEL: 0.78 M/S
PV PEAK VELOCITY: 1.28 CM/S
RA MAJOR: 3.47 CM
RA PRESSURE: 3 MMHG
RA WIDTH: 3.11 CM
RAW LLN: 3.06
RAW PRE REF: 385.4 %
RAW PRE: 11.79 CMH2O*S/L (ref 3.06–3.06)
RAW REF: 3.06
RIGHT VENTRICULAR END-DIASTOLIC DIMENSION: 3.59 CM
RV LLN: 1.5
RV PRE REF: 29.3 %
RV REF: 2.08
RVTLC LLN: 35
RVTLC PRE REF: 55.8 %
RVTLC PRE: 24.63 % (ref 34.53–53.71)
RVTLC REF: 44
SINUS: 2.97 CM
STJ: 2.99 CM
TDI LATERAL: 0.1 M/S
TDI SEPTAL: 0.1 M/S
TDI: 0.1 M/S
TLC LLN: 3.78
TLC PRE REF: 51.9 %
TLC REF: 4.77
TR MAX PG: 19 MMHG
TRICUSPID ANNULAR PLANE SYSTOLIC EXCURSION: 1.91 CM
TV REST PULMONARY ARTERY PRESSURE: 22 MMHG
VA PRE: 1.94 L (ref 4.62–4.62)
VA SINGLE BREATH LLN: 4.62
VA SINGLE BREATH PRE REF: 41.9 %
VA SINGLE BREATH REF: 4.62
VC LLN: 1.88
VC PRE REF: 71.2 %
VC PRE: 1.87 L (ref 1.88–3.36)
VC REF: 2.62
VTGRAWPRE: 1.64 L

## 2023-02-23 PROCEDURE — 94060 EVALUATION OF WHEEZING: CPT | Mod: S$GLB,,, | Performed by: INTERNAL MEDICINE

## 2023-02-23 PROCEDURE — 94726 PLETHYSMOGRAPHY LUNG VOLUMES: CPT | Mod: S$GLB,,, | Performed by: INTERNAL MEDICINE

## 2023-02-23 PROCEDURE — 93306 ECHO (CUPID ONLY): ICD-10-PCS | Mod: 26,,, | Performed by: INTERNAL MEDICINE

## 2023-02-23 PROCEDURE — 93306 TTE W/DOPPLER COMPLETE: CPT

## 2023-02-23 PROCEDURE — 94726 PULM FUNCT TST PLETHYSMOGRAP: ICD-10-PCS | Mod: S$GLB,,, | Performed by: INTERNAL MEDICINE

## 2023-02-23 PROCEDURE — 94729 DIFFUSING CAPACITY: CPT | Mod: S$GLB,,, | Performed by: INTERNAL MEDICINE

## 2023-02-23 PROCEDURE — 93306 TTE W/DOPPLER COMPLETE: CPT | Mod: 26,,, | Performed by: INTERNAL MEDICINE

## 2023-02-23 PROCEDURE — 94060 PR EVAL OF BRONCHOSPASM: ICD-10-PCS | Mod: S$GLB,,, | Performed by: INTERNAL MEDICINE

## 2023-02-23 PROCEDURE — 94729 PR C02/MEMBANE DIFFUSE CAPACITY: ICD-10-PCS | Mod: S$GLB,,, | Performed by: INTERNAL MEDICINE

## 2023-02-24 ENCOUNTER — OFFICE VISIT (OUTPATIENT)
Dept: PULMONOLOGY | Facility: CLINIC | Age: 73
End: 2023-02-24
Payer: MEDICARE

## 2023-02-24 VITALS
DIASTOLIC BLOOD PRESSURE: 78 MMHG | OXYGEN SATURATION: 96 % | HEART RATE: 90 BPM | SYSTOLIC BLOOD PRESSURE: 126 MMHG | BODY MASS INDEX: 35.68 KG/M2 | RESPIRATION RATE: 18 BRPM | HEIGHT: 63 IN | WEIGHT: 201.38 LBS

## 2023-02-24 DIAGNOSIS — C79.89 MALIGNANT NEOPLASM METASTATIC TO OTHER SITE: Primary | ICD-10-CM

## 2023-02-24 DIAGNOSIS — J41.0 SIMPLE CHRONIC BRONCHITIS: ICD-10-CM

## 2023-02-24 DIAGNOSIS — R91.8 MASS OF UPPER LOBE OF LEFT LUNG: ICD-10-CM

## 2023-02-24 DIAGNOSIS — D50.8 OTHER IRON DEFICIENCY ANEMIA: ICD-10-CM

## 2023-02-24 DIAGNOSIS — R05.3 CHRONIC COUGH: ICD-10-CM

## 2023-02-24 DIAGNOSIS — R59.0 MEDIASTINAL LYMPHADENOPATHY: ICD-10-CM

## 2023-02-24 PROBLEM — C79.9 NEOPLASM, METASTATIC: Status: ACTIVE | Noted: 2023-02-24

## 2023-02-24 PROCEDURE — 3008F PR BODY MASS INDEX (BMI) DOCUMENTED: ICD-10-PCS | Mod: CPTII,S$GLB,, | Performed by: INTERNAL MEDICINE

## 2023-02-24 PROCEDURE — 3074F PR MOST RECENT SYSTOLIC BLOOD PRESSURE < 130 MM HG: ICD-10-PCS | Mod: CPTII,S$GLB,, | Performed by: INTERNAL MEDICINE

## 2023-02-24 PROCEDURE — 3078F PR MOST RECENT DIASTOLIC BLOOD PRESSURE < 80 MM HG: ICD-10-PCS | Mod: CPTII,S$GLB,, | Performed by: INTERNAL MEDICINE

## 2023-02-24 PROCEDURE — 99215 OFFICE O/P EST HI 40 MIN: CPT | Mod: S$GLB,,, | Performed by: INTERNAL MEDICINE

## 2023-02-24 PROCEDURE — 3288F FALL RISK ASSESSMENT DOCD: CPT | Mod: CPTII,S$GLB,, | Performed by: INTERNAL MEDICINE

## 2023-02-24 PROCEDURE — 1101F PR PT FALLS ASSESS DOC 0-1 FALLS W/OUT INJ PAST YR: ICD-10-PCS | Mod: CPTII,S$GLB,, | Performed by: INTERNAL MEDICINE

## 2023-02-24 PROCEDURE — 1101F PT FALLS ASSESS-DOCD LE1/YR: CPT | Mod: CPTII,S$GLB,, | Performed by: INTERNAL MEDICINE

## 2023-02-24 PROCEDURE — 3008F BODY MASS INDEX DOCD: CPT | Mod: CPTII,S$GLB,, | Performed by: INTERNAL MEDICINE

## 2023-02-24 PROCEDURE — G0008 ADMIN INFLUENZA VIRUS VAC: HCPCS | Mod: S$GLB,,, | Performed by: INTERNAL MEDICINE

## 2023-02-24 PROCEDURE — 1159F MED LIST DOCD IN RCRD: CPT | Mod: CPTII,S$GLB,, | Performed by: INTERNAL MEDICINE

## 2023-02-24 PROCEDURE — 99999 PR PBB SHADOW E&M-EST. PATIENT-LVL IV: ICD-10-PCS | Mod: PBBFAC,,, | Performed by: INTERNAL MEDICINE

## 2023-02-24 PROCEDURE — 1159F PR MEDICATION LIST DOCUMENTED IN MEDICAL RECORD: ICD-10-PCS | Mod: CPTII,S$GLB,, | Performed by: INTERNAL MEDICINE

## 2023-02-24 PROCEDURE — 1160F RVW MEDS BY RX/DR IN RCRD: CPT | Mod: CPTII,S$GLB,, | Performed by: INTERNAL MEDICINE

## 2023-02-24 PROCEDURE — 90694 FLU VACCINE - QUADRIVALENT - ADJUVANTED: ICD-10-PCS | Mod: S$GLB,,, | Performed by: INTERNAL MEDICINE

## 2023-02-24 PROCEDURE — 3288F PR FALLS RISK ASSESSMENT DOCUMENTED: ICD-10-PCS | Mod: CPTII,S$GLB,, | Performed by: INTERNAL MEDICINE

## 2023-02-24 PROCEDURE — 1160F PR REVIEW ALL MEDS BY PRESCRIBER/CLIN PHARMACIST DOCUMENTED: ICD-10-PCS | Mod: CPTII,S$GLB,, | Performed by: INTERNAL MEDICINE

## 2023-02-24 PROCEDURE — 99999 PR PBB SHADOW E&M-EST. PATIENT-LVL IV: CPT | Mod: PBBFAC,,, | Performed by: INTERNAL MEDICINE

## 2023-02-24 PROCEDURE — 90694 VACC AIIV4 NO PRSRV 0.5ML IM: CPT | Mod: S$GLB,,, | Performed by: INTERNAL MEDICINE

## 2023-02-24 PROCEDURE — 3078F DIAST BP <80 MM HG: CPT | Mod: CPTII,S$GLB,, | Performed by: INTERNAL MEDICINE

## 2023-02-24 PROCEDURE — G0008 FLU VACCINE - QUADRIVALENT - ADJUVANTED: ICD-10-PCS | Mod: S$GLB,,, | Performed by: INTERNAL MEDICINE

## 2023-02-24 PROCEDURE — 3074F SYST BP LT 130 MM HG: CPT | Mod: CPTII,S$GLB,, | Performed by: INTERNAL MEDICINE

## 2023-02-24 PROCEDURE — 99215 PR OFFICE/OUTPT VISIT, EST, LEVL V, 40-54 MIN: ICD-10-PCS | Mod: S$GLB,,, | Performed by: INTERNAL MEDICINE

## 2023-02-24 RX ORDER — PROMETHAZINE HYDROCHLORIDE AND CODEINE PHOSPHATE 6.25; 1 MG/5ML; MG/5ML
5 SOLUTION ORAL EVERY 8 HOURS PRN
Qty: 240 ML | Refills: 0 | Status: SHIPPED | OUTPATIENT
Start: 2023-02-24 | End: 2023-03-16 | Stop reason: SDUPTHER

## 2023-02-24 RX ORDER — IRON PS COMPLEX/B12/FOLIC ACID 150-25-1
1 CAPSULE ORAL DAILY
Qty: 30 CAPSULE | Refills: 11 | Status: SHIPPED | OUTPATIENT
Start: 2023-02-24 | End: 2023-12-20

## 2023-02-24 RX ORDER — IPRATROPIUM BROMIDE 0.5 MG/2.5ML
500 SOLUTION RESPIRATORY (INHALATION) 4 TIMES DAILY
Qty: 300 ML | Refills: 11 | Status: SHIPPED | OUTPATIENT
Start: 2023-02-24 | End: 2023-12-20

## 2023-02-24 NOTE — PROGRESS NOTES
Subjective:     Patient ID: Dory Xie is a 73 y.o. female.    Chief Complaint:  Slow to resolve pneumonia     HPI 74 y/o nonsmoker with history of pneumonia 2022  Hospitalized at [facility] 2022 for pneumonia - records not available.  First episode of pneumonia  No prior history of asthma  Nonsmoker  Dyspnea  Patient complains of shortness of breath. Symptoms occur while getting dressed. Symptoms began 6 weeks ago, unchanged since. Associated symptoms include  difficulty breathing, dry cough, dyspnea on exertion, post nasal drip, and productive cough. She denies chest pain, located left chest. She does not have had recent travel. Weight has decreased 22 pounds over last few months . Symptoms are exacerbated by any exercise. Symptoms are alleviated by rest.     Both parents  of lung cancer - small cell  Sister with lymphoma    Past Medical History:   Diagnosis Date    Arthritis     Cancer     cervical    Chronic diastolic (congestive) heart failure 02/10/2023    Chronic pain     From osteoarthritis    Colitis     GERD (gastroesophageal reflux disease)     Seasonal allergies      Past Surgical History:   Procedure Laterality Date    COLONOSCOPY N/A 2016    Procedure: COLONOSCOPY;  Surgeon: Hector Summers III, MD;  Location: [facility];  Service: Endoscopy;  Laterality: N/A;    EYE SURGERY      HYSTERECTOMY      29yrs old    JOINT REPLACEMENT  Both knees    KNEE SURGERY Left     KNEE SURGERY Right     TONSILLECTOMY      TOTAL KNEE ARTHROPLASTY Bilateral 2019    TUBAL LIGATION       Review of patient's allergies indicates:  No Known Allergies  Current Outpatient Medications on File Prior to Visit   Medication Sig Dispense Refill    albuterol (PROVENTIL HFA) 90 mcg/actuation inhaler Inhale 2 puffs into the lungs every 4 (four) hours as needed for Wheezing. Rescue 18 g 0    albuterol (PROVENTIL) 2.5 mg /3 mL (0.083 %) nebulizer solution Take 3 mLs (2.5 mg total) by  nebulization every 4 to 6 hours as needed for Wheezing or Shortness of Breath. 360 mL 11    amoxicillin-clavulanate 875-125mg (AUGMENTIN) 875-125 mg per tablet Take 1 tablet by mouth every 12 (twelve) hours. 20 tablet 0    atorvastatin (LIPITOR) 20 MG tablet Take 20 mg by mouth.      azelastine (ASTELIN) 137 mcg (0.1 %) nasal spray 1 spray by Nasal route 2 (two) times daily.      FLUoxetine 20 MG capsule Take 1 capsule (20 mg total) by mouth once daily. 30 capsule 11    fluticasone propionate (FLONASE) 50 mcg/actuation nasal spray 2 sprays (100 mcg total) by Each Nostril route once daily. 16 g 4    loratadine (CLARITIN) 10 mg tablet Take 1 tablet (10 mg total) by mouth once daily. 30 tablet 11    ondansetron (ZOFRAN-ODT) 8 MG TbDL Take 8 mg by mouth.      pantoprazole (PROTONIX) 20 MG tablet Take 1 tablet (20 mg total) by mouth once daily. 90 tablet 3    potassium gluconate 595 (99) mg Tab Take 1 tablet by mouth Daily.       No current facility-administered medications on file prior to visit.     Social History     Socioeconomic History    Marital status: Single   Tobacco Use    Smoking status: Never     Passive exposure: Never    Smokeless tobacco: Never   Substance and Sexual Activity    Alcohol use: No    Drug use: No     Family History   Problem Relation Age of Onset    Diabetes Mother     Heart disease Mother     Cancer Mother         lung    Cancer Father         Lung    Hyperlipidemia Father     Hypertension Other     Breast cancer Sister        Review of Systems   Constitutional:  Positive for fatigue. Negative for fever.   HENT:  Positive for postnasal drip, rhinorrhea and congestion.    Eyes:  Negative for redness and itching.   Respiratory:  Positive for cough, sputum production, shortness of breath, dyspnea on extertion, use of rescue inhaler and Paroxysmal Nocturnal Dyspnea.    Cardiovascular:  Negative for chest pain, palpitations and leg swelling.   Genitourinary:  Negative for difficulty urinating  "and hematuria.   Endocrine:  Negative for cold intolerance and heat intolerance.    Musculoskeletal:  Positive for arthralgias.   Skin:  Negative for rash.   Gastrointestinal:  Negative for nausea and abdominal pain.   Neurological:  Negative for dizziness, syncope, weakness and light-headedness.   Hematological:  Negative for adenopathy. Does not bruise/bleed easily.   Psychiatric/Behavioral:  Negative for sleep disturbance. The patient is not nervous/anxious.      Objective:      /78   Pulse 90   Resp 18   Ht 5' 3" (1.6 m)   Wt 91.3 kg (201 lb 6.2 oz)   SpO2 96%   BMI 35.67 kg/m²   Physical Exam  Vitals and nursing note reviewed.   Constitutional:       Appearance: She is well-developed. She is obese.   HENT:      Head: Normocephalic and atraumatic.      Nose: Nose normal.      Mouth/Throat:      Pharynx: No oropharyngeal exudate.   Eyes:      Conjunctiva/sclera: Conjunctivae normal.      Pupils: Pupils are equal, round, and reactive to light.   Neck:      Thyroid: No thyromegaly.      Vascular: No JVD.      Trachea: No tracheal deviation.   Cardiovascular:      Rate and Rhythm: Normal rate and regular rhythm.      Heart sounds: Normal heart sounds.   Pulmonary:      Effort: Pulmonary effort is normal. No respiratory distress.      Breath sounds: Examination of the right-lower field reveals wheezing. Examination of the left-lower field reveals wheezing. Decreased breath sounds and wheezing present. No rhonchi or rales.   Chest:      Chest wall: No tenderness.   Abdominal:      General: Bowel sounds are normal.      Palpations: Abdomen is soft.   Musculoskeletal:         General: Normal range of motion.      Cervical back: Neck supple.   Lymphadenopathy:      Cervical: No cervical adenopathy.   Skin:     General: Skin is warm and dry.   Neurological:      Mental Status: She is alert and oriented to person, place, and time.     Personal Diagnostic Review  CT reviewed  PET:    Details    Reading Physician " Reading Date Result Priority   Errol Bland MD  [phone] 2/22/2023 Routine     Narrative & Impression  EXAMINATION:  NM PET CT ROUTINE     CLINICAL HISTORY:  Lung nodule, > 8mm; Other nonspecific abnormal finding of lung field     TECHNIQUE:  11.61 mCi of F18-FDG was administered intravenously in the right antecubital fossa.  After an approximately 60 min distribution time, PET/CT images were acquired from the skull base to the mid thigh. Transmission images were acquired to correct for attenuation using a whole body low-dose CT scan without contrast with the arms positioned above the head.     COMPARISON:  Chest CT, 02/08/2023     FINDINGS:  Quality of the study: Adequate.     SUV max of the liver parenchyma is 3.3.     Neck: Multiple lower bilateral jugular chain lymph nodes with marked FDG avidity with SUV max 16.  The largest lymph node is a left supraclavicular lymph node measuring 1.8 x 1.5 cm.     Chest: Bulky mediastinal lymphadenopathy in the superior mediastinum and paratracheal region and subcarinal region with SUV max 23.  The subcarinal lymph node measures 3.2 x 2.4 cm.     Large left hilar mass which extends into the left upper lobe with marked FDG avidity with SUV max 26.     Large right hilar mass/consolidation which extends the right middle lobe with SUV max 16.     Abdomen/pelvis: Small epigastric lymph node with moderate FDG avidity with SUV max 6.1.  Small paola hepatis lymph node with SUV max 10.0.     Left pelvic sidewall small lymph node with SUV max 11.  Left common femoral chain lymph node with SUV max 12.     There are couple of small lymph nodes anterior to the left sartorius muscle which show marked FDG avidity with SUV max 7.3.     Osseous structures: No focal abnormal FDG avidity in the skeleton.     Physiologic uptake of the tracer is present within the brain, salivary glands, myocardium, GI and  tracts.     Incidental CT findings: N/A     Impression:     1. Extensive  metastatic disease.  2. Large consolidation/masses in the lungs bilaterally with marked FDG avidity.  3. Metastatic markedly FDG avid hilar and mediastinal and cervical chain/supraclavicular lymphadenopathy.  4. Metastatic markedly FDG avid lymphadenopathy below the diaphragm in the epigastric region and paola hepatis and left pelvic sidewall and left common femoral chain.  Also small markedly FDG avid nodule/lymph nodes immediately anterior to the left thigh sartorius muscle.  All CT scans at this facility are performed  using dose modulation techniques as appropriate to performed exam including the following:  automated exposure control; adjustment of mA and/or kV according to the patients size (this includes techniques or standardized protocols for targeted exams where dose is matched to indication/reason for exam: i.e. extremities or head);  iterative reconstruction technique.        Electronically signed by: Errol Bland MD  Date:                                            02/22/2023  Time:                                           15:58           Exam Ended: 02/22/23 15:08 Last Resulted: 02/22/23 15:58       Order Details     View Encounter     Lab and Collection Details     Routing     Result History     View Encounter Conversation           Result Care Coordination      Patient Communication     Add Comments   Seen Back to Top             NM PET CT Routine Skull to Mid Thigh: Patient Communication     Add Comments   Seen     External Result Report    External Result Report     Narrative & Impression    EXAMINATION:  NM PET CT ROUTINE     CLINICAL HISTORY:  Lung nodule, > 8mm; Other nonspecific abnormal finding of lung field     TECHNIQUE:  11.61 mCi of F18-FDG was administered intravenously in the right antecubital fossa.  After an approximately 60 min distribution time, PET/CT images were acquired from the skull base to the mid thigh. Transmission images were acquired to correct for attenuation using a  whole body low-dose CT scan without contrast with the arms positioned above the head.     COMPARISON:  Chest CT, 02/08/2023     FINDINGS:  Quality of the study: Adequate.     SUV max of the liver parenchyma is 3.3.     Neck: Multiple lower bilateral jugular chain lymph nodes with marked FDG avidity with SUV max 16.  The largest lymph node is a left supraclavicular lymph node measuring 1.8 x 1.5 cm.     Chest: Bulky mediastinal lymphadenopathy in the superior mediastinum and paratracheal region and subcarinal region with SUV max 23.  The subcarinal lymph node measures 3.2 x 2.4 cm.     Large left hilar mass which extends into the left upper lobe with marked FDG avidity with SUV max 26.     Large right hilar mass/consolidation which extends the right middle lobe with SUV max 16.     Abdomen/pelvis: Small epigastric lymph node with moderate FDG avidity with SUV max 6.1.  Small paola hepatis lymph node with SUV max 10.0.     Left pelvic sidewall small lymph node with SUV max 11.  Left common femoral chain lymph node with SUV max 12.     There are couple of small lymph nodes anterior to the left sartorius muscle which show marked FDG avidity with SUV max 7.3.     Osseous structures: No focal abnormal FDG avidity in the skeleton.     Physiologic uptake of the tracer is present within the brain, salivary glands, myocardium, GI and  tracts.     Incidental CT findings: N/A     Impression:     1. Extensive metastatic disease.  2. Large consolidation/masses in the lungs bilaterally with marked FDG avidity.  3. Metastatic markedly FDG avid hilar and mediastinal and cervical chain/supraclavicular lymphadenopathy.  4. Metastatic markedly FDG avid lymphadenopathy below the diaphragm in the epigastric region and paola hepatis and left pelvic sidewall and left common femoral chain.  Also small markedly FDG avid nodule/lymph nodes immediately anterior to the left thigh sartorius muscle.  All CT scans at this facility are performed   using dose modulation techniques as appropriate to performed exam including the following:  automated exposure control; adjustment of mA and/or kV according to the patients size (this includes techniques or standardized protocols for targeted exams where dose is matched to indication/reason for exam: i.e. extremities or head);  iterative reconstruction technique.        Electronically signed by: Errol Bland MD  Date:                                            02/22/2023  Time:                                           15:58          Pulmonary Studies Review 2/24/2023   SpO2 96   Height 63   Weight 3222.24   BMI (Calculated) 35.7   Predicted Distance 223.81   Predicted Distance Meters (Calculated) 367.69       Echo Saline Bubble? No  · The left ventricle is normal in size with concentric remodeling and   normal systolic function.  · The estimated ejection fraction is 65%.  · Normal left ventricular diastolic function.  · Normal right ventricular size with normal right ventricular systolic   function.  · There is mild aortic valve stenosis.  · Aortic valve area is 1.92 cm2; peak velocity is 2.08 m/s; mean gradient   is 10 mmHg.  · Mild tricuspid regurgitation.  · Normal central venous pressure (3 mmHg).  · The estimated PA systolic pressure is 22 mmHg.  · Trivial posterior pericardial effusion.         Office Spirometry Results:     No flowsheet data found.  Pulmonary Studies Review 2/24/2023   SpO2 96   Height 63   Weight 3222.24   BMI (Calculated) 35.7   Predicted Distance 223.81   Predicted Distance Meters (Calculated) 367.69         Assessment:       Mass of upper lobe of left lung  PET positive    Neoplasm, metastatic  Differential diagnosis lung cancer vs lymphoma  Discussed with radiology - biopsy of cervical lymph node probably best approach      Malignant neoplasm metastatic to other site  -     Cancel: CT Biopsy Lung w/ guidance; Future; Expected date: 02/24/2023  -     CT Biopsy Lymph Node; Future;  Expected date: 02/24/2023    Mass of upper lobe of left lung    Mediastinal lymphadenopathy  -     Cancel: CT Biopsy Lung w/ guidance; Future; Expected date: 02/24/2023  -     CT Biopsy Lymph Node; Future; Expected date: 02/24/2023    Chronic cough  -     promethazine-codeine 6.25-10 mg/5 ml (PHENERGAN WITH CODEINE) 6.25-10 mg/5 mL syrup; Take 5 mLs by mouth every 8 (eight) hours as needed for Cough.  Dispense: 240 mL; Refill: 0    Simple chronic bronchitis  -     ipratropium (ATROVENT) 0.02 % nebulizer solution; Take 2.5 mLs (500 mcg total) by nebulization 4 (four) times daily. Rescue  Dispense: 300 mL; Refill: 11    Other iron deficiency anemia  -     iron polysacch complex-b12-fa 150-25-1 mg-mcg-mg (FERREX 150 FORTE) 150-25-1 mg-mcg-mg Cap; Take 1 capsule by mouth once daily.  Dispense: 30 capsule; Refill: 11    Other orders  -     Influenza - Quadrivalent (Adjuvanted)          Outpatient Encounter Medications as of 2/24/2023   Medication Sig Dispense Refill    albuterol (PROVENTIL HFA) 90 mcg/actuation inhaler Inhale 2 puffs into the lungs every 4 (four) hours as needed for Wheezing. Rescue 18 g 0    albuterol (PROVENTIL) 2.5 mg /3 mL (0.083 %) nebulizer solution Take 3 mLs (2.5 mg total) by nebulization every 4 to 6 hours as needed for Wheezing or Shortness of Breath. 360 mL 11    amoxicillin-clavulanate 875-125mg (AUGMENTIN) 875-125 mg per tablet Take 1 tablet by mouth every 12 (twelve) hours. 20 tablet 0    atorvastatin (LIPITOR) 20 MG tablet Take 20 mg by mouth.      azelastine (ASTELIN) 137 mcg (0.1 %) nasal spray 1 spray by Nasal route 2 (two) times daily.      FLUoxetine 20 MG capsule Take 1 capsule (20 mg total) by mouth once daily. 30 capsule 11    fluticasone propionate (FLONASE) 50 mcg/actuation nasal spray 2 sprays (100 mcg total) by Each Nostril route once daily. 16 g 4    loratadine (CLARITIN) 10 mg tablet Take 1 tablet (10 mg total) by mouth once daily. 30 tablet 11    ondansetron (ZOFRAN-ODT) 8  MG TbDL Take 8 mg by mouth.      pantoprazole (PROTONIX) 20 MG tablet Take 1 tablet (20 mg total) by mouth once daily. 90 tablet 3    potassium gluconate 595 (99) mg Tab Take 1 tablet by mouth Daily.      ipratropium (ATROVENT) 0.02 % nebulizer solution Take 2.5 mLs (500 mcg total) by nebulization 4 (four) times daily. Rescue 300 mL 11    iron polysacch complex-b12-fa 150-25-1 mg-mcg-mg (FERREX 150 FORTE) 150-25-1 mg-mcg-mg Cap Take 1 capsule by mouth once daily. 30 capsule 11    promethazine-codeine 6.25-10 mg/5 ml (PHENERGAN WITH CODEINE) 6.25-10 mg/5 mL syrup Take 5 mLs by mouth every 8 (eight) hours as needed for Cough. 240 mL 0    [DISCONTINUED] loratadine (CLARITIN) 10 mg tablet Take 1 tablet (10 mg total) by mouth once daily. 30 tablet 11     No facility-administered encounter medications on file as of 2/24/2023.     Plan:       Requested Prescriptions     Signed Prescriptions Disp Refills    promethazine-codeine 6.25-10 mg/5 ml (PHENERGAN WITH CODEINE) 6.25-10 mg/5 mL syrup 240 mL 0     Sig: Take 5 mLs by mouth every 8 (eight) hours as needed for Cough.    ipratropium (ATROVENT) 0.02 % nebulizer solution 300 mL 11     Sig: Take 2.5 mLs (500 mcg total) by nebulization 4 (four) times daily. Rescue    iron polysacch complex-b12-fa 150-25-1 mg-mcg-mg (FERREX 150 FORTE) 150-25-1 mg-mcg-mg Cap 30 capsule 11     Sig: Take 1 capsule by mouth once daily.     Problem List Items Addressed This Visit       Mass of upper lobe of left lung    Current Assessment & Plan     PET positive         Mediastinal lymphadenopathy    Relevant Orders    CT Biopsy Lymph Node    Neoplasm, metastatic - Primary    Current Assessment & Plan     Differential diagnosis lung cancer vs lymphoma  Discussed with radiology - biopsy of cervical lymph node probably best approach         Relevant Orders    CT Biopsy Lymph Node     Other Visit Diagnoses       Chronic cough        Relevant Medications    promethazine-codeine 6.25-10 mg/5 ml  (PHENERGAN WITH CODEINE) 6.25-10 mg/5 mL syrup    Simple chronic bronchitis        Relevant Medications    ipratropium (ATROVENT) 0.02 % nebulizer solution    Other iron deficiency anemia        Relevant Medications    iron polysacch complex-b12-fa 150-25-1 mg-mcg-mg (FERREX 150 FORTE) 150-25-1 mg-mcg-mg Cap             Follow up in about 2 weeks (around 3/10/2023) for Review progress.    MEDICAL DECISION MAKING: Moderate to high complexity.  Overall, the multiple problems listed are of moderate to high severity that may impact quality of life and activities of daily living. Side effects of medications, treatment plan as well as options and alternatives reviewed and discussed with patient. There was counseling of patient concerning these issues.    Total time spent in counseling and coordination of care - 60  minutes of total time spent on the encounter, which includes face to face time and non-face to face time preparing to see the patient (eg, review of tests), Obtaining and/or reviewing separately obtained history, Documenting clinical information in the electronic or other health record, Independently interpreting results (not separately reported) and communicating results to the patient/family/caregiver, or Care coordination (not separately reported).    Time was used in discussion of prognosis, risks, benefits of treatment, instructions and compliance with regimen . Discussion with other physicians and/or health care providers - home health or for use of durable medical equipment (oxygen, nebulizers, CPAP, BiPAP) occurred.

## 2023-02-24 NOTE — H&P (VIEW-ONLY)
Subjective:     Patient ID: Dory Xie is a 73 y.o. female.    Chief Complaint:  Slow to resolve pneumonia     HPI 72 y/o nonsmoker with history of pneumonia 2022  Hospitalized at [facility] 2022 for pneumonia - records not available.  First episode of pneumonia  No prior history of asthma  Nonsmoker  Dyspnea  Patient complains of shortness of breath. Symptoms occur while getting dressed. Symptoms began 6 weeks ago, unchanged since. Associated symptoms include  difficulty breathing, dry cough, dyspnea on exertion, post nasal drip, and productive cough. She denies chest pain, located left chest. She does not have had recent travel. Weight has decreased 22 pounds over last few months . Symptoms are exacerbated by any exercise. Symptoms are alleviated by rest.     Both parents  of lung cancer - small cell  Sister with lymphoma    Past Medical History:   Diagnosis Date    Arthritis     Cancer     cervical    Chronic diastolic (congestive) heart failure 02/10/2023    Chronic pain     From osteoarthritis    Colitis     GERD (gastroesophageal reflux disease)     Seasonal allergies      Past Surgical History:   Procedure Laterality Date    COLONOSCOPY N/A 2016    Procedure: COLONOSCOPY;  Surgeon: Hector Summers III, MD;  Location: [facility];  Service: Endoscopy;  Laterality: N/A;    EYE SURGERY      HYSTERECTOMY      29yrs old    JOINT REPLACEMENT  Both knees    KNEE SURGERY Left     KNEE SURGERY Right     TONSILLECTOMY      TOTAL KNEE ARTHROPLASTY Bilateral 2019    TUBAL LIGATION       Review of patient's allergies indicates:  No Known Allergies  Current Outpatient Medications on File Prior to Visit   Medication Sig Dispense Refill    albuterol (PROVENTIL HFA) 90 mcg/actuation inhaler Inhale 2 puffs into the lungs every 4 (four) hours as needed for Wheezing. Rescue 18 g 0    albuterol (PROVENTIL) 2.5 mg /3 mL (0.083 %) nebulizer solution Take 3 mLs (2.5 mg total) by  nebulization every 4 to 6 hours as needed for Wheezing or Shortness of Breath. 360 mL 11    amoxicillin-clavulanate 875-125mg (AUGMENTIN) 875-125 mg per tablet Take 1 tablet by mouth every 12 (twelve) hours. 20 tablet 0    atorvastatin (LIPITOR) 20 MG tablet Take 20 mg by mouth.      azelastine (ASTELIN) 137 mcg (0.1 %) nasal spray 1 spray by Nasal route 2 (two) times daily.      FLUoxetine 20 MG capsule Take 1 capsule (20 mg total) by mouth once daily. 30 capsule 11    fluticasone propionate (FLONASE) 50 mcg/actuation nasal spray 2 sprays (100 mcg total) by Each Nostril route once daily. 16 g 4    loratadine (CLARITIN) 10 mg tablet Take 1 tablet (10 mg total) by mouth once daily. 30 tablet 11    ondansetron (ZOFRAN-ODT) 8 MG TbDL Take 8 mg by mouth.      pantoprazole (PROTONIX) 20 MG tablet Take 1 tablet (20 mg total) by mouth once daily. 90 tablet 3    potassium gluconate 595 (99) mg Tab Take 1 tablet by mouth Daily.       No current facility-administered medications on file prior to visit.     Social History     Socioeconomic History    Marital status: Single   Tobacco Use    Smoking status: Never     Passive exposure: Never    Smokeless tobacco: Never   Substance and Sexual Activity    Alcohol use: No    Drug use: No     Family History   Problem Relation Age of Onset    Diabetes Mother     Heart disease Mother     Cancer Mother         lung    Cancer Father         Lung    Hyperlipidemia Father     Hypertension Other     Breast cancer Sister        Review of Systems   Constitutional:  Positive for fatigue. Negative for fever.   HENT:  Positive for postnasal drip, rhinorrhea and congestion.    Eyes:  Negative for redness and itching.   Respiratory:  Positive for cough, sputum production, shortness of breath, dyspnea on extertion, use of rescue inhaler and Paroxysmal Nocturnal Dyspnea.    Cardiovascular:  Negative for chest pain, palpitations and leg swelling.   Genitourinary:  Negative for difficulty urinating  "and hematuria.   Endocrine:  Negative for cold intolerance and heat intolerance.    Musculoskeletal:  Positive for arthralgias.   Skin:  Negative for rash.   Gastrointestinal:  Negative for nausea and abdominal pain.   Neurological:  Negative for dizziness, syncope, weakness and light-headedness.   Hematological:  Negative for adenopathy. Does not bruise/bleed easily.   Psychiatric/Behavioral:  Negative for sleep disturbance. The patient is not nervous/anxious.      Objective:      /78   Pulse 90   Resp 18   Ht 5' 3" (1.6 m)   Wt 91.3 kg (201 lb 6.2 oz)   SpO2 96%   BMI 35.67 kg/m²   Physical Exam  Vitals and nursing note reviewed.   Constitutional:       Appearance: She is well-developed. She is obese.   HENT:      Head: Normocephalic and atraumatic.      Nose: Nose normal.      Mouth/Throat:      Pharynx: No oropharyngeal exudate.   Eyes:      Conjunctiva/sclera: Conjunctivae normal.      Pupils: Pupils are equal, round, and reactive to light.   Neck:      Thyroid: No thyromegaly.      Vascular: No JVD.      Trachea: No tracheal deviation.   Cardiovascular:      Rate and Rhythm: Normal rate and regular rhythm.      Heart sounds: Normal heart sounds.   Pulmonary:      Effort: Pulmonary effort is normal. No respiratory distress.      Breath sounds: Examination of the right-lower field reveals wheezing. Examination of the left-lower field reveals wheezing. Decreased breath sounds and wheezing present. No rhonchi or rales.   Chest:      Chest wall: No tenderness.   Abdominal:      General: Bowel sounds are normal.      Palpations: Abdomen is soft.   Musculoskeletal:         General: Normal range of motion.      Cervical back: Neck supple.   Lymphadenopathy:      Cervical: No cervical adenopathy.   Skin:     General: Skin is warm and dry.   Neurological:      Mental Status: She is alert and oriented to person, place, and time.     Personal Diagnostic Review  CT reviewed  PET:    Details    Reading Physician " Reading Date Result Priority   Errol Bland MD  [phone] 2/22/2023 Routine     Narrative & Impression  EXAMINATION:  NM PET CT ROUTINE     CLINICAL HISTORY:  Lung nodule, > 8mm; Other nonspecific abnormal finding of lung field     TECHNIQUE:  11.61 mCi of F18-FDG was administered intravenously in the right antecubital fossa.  After an approximately 60 min distribution time, PET/CT images were acquired from the skull base to the mid thigh. Transmission images were acquired to correct for attenuation using a whole body low-dose CT scan without contrast with the arms positioned above the head.     COMPARISON:  Chest CT, 02/08/2023     FINDINGS:  Quality of the study: Adequate.     SUV max of the liver parenchyma is 3.3.     Neck: Multiple lower bilateral jugular chain lymph nodes with marked FDG avidity with SUV max 16.  The largest lymph node is a left supraclavicular lymph node measuring 1.8 x 1.5 cm.     Chest: Bulky mediastinal lymphadenopathy in the superior mediastinum and paratracheal region and subcarinal region with SUV max 23.  The subcarinal lymph node measures 3.2 x 2.4 cm.     Large left hilar mass which extends into the left upper lobe with marked FDG avidity with SUV max 26.     Large right hilar mass/consolidation which extends the right middle lobe with SUV max 16.     Abdomen/pelvis: Small epigastric lymph node with moderate FDG avidity with SUV max 6.1.  Small paola hepatis lymph node with SUV max 10.0.     Left pelvic sidewall small lymph node with SUV max 11.  Left common femoral chain lymph node with SUV max 12.     There are couple of small lymph nodes anterior to the left sartorius muscle which show marked FDG avidity with SUV max 7.3.     Osseous structures: No focal abnormal FDG avidity in the skeleton.     Physiologic uptake of the tracer is present within the brain, salivary glands, myocardium, GI and  tracts.     Incidental CT findings: N/A     Impression:     1. Extensive  metastatic disease.  2. Large consolidation/masses in the lungs bilaterally with marked FDG avidity.  3. Metastatic markedly FDG avid hilar and mediastinal and cervical chain/supraclavicular lymphadenopathy.  4. Metastatic markedly FDG avid lymphadenopathy below the diaphragm in the epigastric region and paola hepatis and left pelvic sidewall and left common femoral chain.  Also small markedly FDG avid nodule/lymph nodes immediately anterior to the left thigh sartorius muscle.  All CT scans at this facility are performed  using dose modulation techniques as appropriate to performed exam including the following:  automated exposure control; adjustment of mA and/or kV according to the patients size (this includes techniques or standardized protocols for targeted exams where dose is matched to indication/reason for exam: i.e. extremities or head);  iterative reconstruction technique.        Electronically signed by: Errol Bland MD  Date:                                            02/22/2023  Time:                                           15:58           Exam Ended: 02/22/23 15:08 Last Resulted: 02/22/23 15:58       Order Details     View Encounter     Lab and Collection Details     Routing     Result History     View Encounter Conversation           Result Care Coordination      Patient Communication     Add Comments   Seen Back to Top             NM PET CT Routine Skull to Mid Thigh: Patient Communication     Add Comments   Seen     External Result Report    External Result Report     Narrative & Impression    EXAMINATION:  NM PET CT ROUTINE     CLINICAL HISTORY:  Lung nodule, > 8mm; Other nonspecific abnormal finding of lung field     TECHNIQUE:  11.61 mCi of F18-FDG was administered intravenously in the right antecubital fossa.  After an approximately 60 min distribution time, PET/CT images were acquired from the skull base to the mid thigh. Transmission images were acquired to correct for attenuation using a  whole body low-dose CT scan without contrast with the arms positioned above the head.     COMPARISON:  Chest CT, 02/08/2023     FINDINGS:  Quality of the study: Adequate.     SUV max of the liver parenchyma is 3.3.     Neck: Multiple lower bilateral jugular chain lymph nodes with marked FDG avidity with SUV max 16.  The largest lymph node is a left supraclavicular lymph node measuring 1.8 x 1.5 cm.     Chest: Bulky mediastinal lymphadenopathy in the superior mediastinum and paratracheal region and subcarinal region with SUV max 23.  The subcarinal lymph node measures 3.2 x 2.4 cm.     Large left hilar mass which extends into the left upper lobe with marked FDG avidity with SUV max 26.     Large right hilar mass/consolidation which extends the right middle lobe with SUV max 16.     Abdomen/pelvis: Small epigastric lymph node with moderate FDG avidity with SUV max 6.1.  Small paola hepatis lymph node with SUV max 10.0.     Left pelvic sidewall small lymph node with SUV max 11.  Left common femoral chain lymph node with SUV max 12.     There are couple of small lymph nodes anterior to the left sartorius muscle which show marked FDG avidity with SUV max 7.3.     Osseous structures: No focal abnormal FDG avidity in the skeleton.     Physiologic uptake of the tracer is present within the brain, salivary glands, myocardium, GI and  tracts.     Incidental CT findings: N/A     Impression:     1. Extensive metastatic disease.  2. Large consolidation/masses in the lungs bilaterally with marked FDG avidity.  3. Metastatic markedly FDG avid hilar and mediastinal and cervical chain/supraclavicular lymphadenopathy.  4. Metastatic markedly FDG avid lymphadenopathy below the diaphragm in the epigastric region and paola hepatis and left pelvic sidewall and left common femoral chain.  Also small markedly FDG avid nodule/lymph nodes immediately anterior to the left thigh sartorius muscle.  All CT scans at this facility are performed   using dose modulation techniques as appropriate to performed exam including the following:  automated exposure control; adjustment of mA and/or kV according to the patients size (this includes techniques or standardized protocols for targeted exams where dose is matched to indication/reason for exam: i.e. extremities or head);  iterative reconstruction technique.        Electronically signed by: Errol Bland MD  Date:                                            02/22/2023  Time:                                           15:58          Pulmonary Studies Review 2/24/2023   SpO2 96   Height 63   Weight 3222.24   BMI (Calculated) 35.7   Predicted Distance 223.81   Predicted Distance Meters (Calculated) 367.69       Echo Saline Bubble? No  · The left ventricle is normal in size with concentric remodeling and   normal systolic function.  · The estimated ejection fraction is 65%.  · Normal left ventricular diastolic function.  · Normal right ventricular size with normal right ventricular systolic   function.  · There is mild aortic valve stenosis.  · Aortic valve area is 1.92 cm2; peak velocity is 2.08 m/s; mean gradient   is 10 mmHg.  · Mild tricuspid regurgitation.  · Normal central venous pressure (3 mmHg).  · The estimated PA systolic pressure is 22 mmHg.  · Trivial posterior pericardial effusion.         Office Spirometry Results:     No flowsheet data found.  Pulmonary Studies Review 2/24/2023   SpO2 96   Height 63   Weight 3222.24   BMI (Calculated) 35.7   Predicted Distance 223.81   Predicted Distance Meters (Calculated) 367.69         Assessment:       Mass of upper lobe of left lung  PET positive    Neoplasm, metastatic  Differential diagnosis lung cancer vs lymphoma  Discussed with radiology - biopsy of cervical lymph node probably best approach      Malignant neoplasm metastatic to other site  -     Cancel: CT Biopsy Lung w/ guidance; Future; Expected date: 02/24/2023  -     CT Biopsy Lymph Node; Future;  Expected date: 02/24/2023    Mass of upper lobe of left lung    Mediastinal lymphadenopathy  -     Cancel: CT Biopsy Lung w/ guidance; Future; Expected date: 02/24/2023  -     CT Biopsy Lymph Node; Future; Expected date: 02/24/2023    Chronic cough  -     promethazine-codeine 6.25-10 mg/5 ml (PHENERGAN WITH CODEINE) 6.25-10 mg/5 mL syrup; Take 5 mLs by mouth every 8 (eight) hours as needed for Cough.  Dispense: 240 mL; Refill: 0    Simple chronic bronchitis  -     ipratropium (ATROVENT) 0.02 % nebulizer solution; Take 2.5 mLs (500 mcg total) by nebulization 4 (four) times daily. Rescue  Dispense: 300 mL; Refill: 11    Other iron deficiency anemia  -     iron polysacch complex-b12-fa 150-25-1 mg-mcg-mg (FERREX 150 FORTE) 150-25-1 mg-mcg-mg Cap; Take 1 capsule by mouth once daily.  Dispense: 30 capsule; Refill: 11    Other orders  -     Influenza - Quadrivalent (Adjuvanted)          Outpatient Encounter Medications as of 2/24/2023   Medication Sig Dispense Refill    albuterol (PROVENTIL HFA) 90 mcg/actuation inhaler Inhale 2 puffs into the lungs every 4 (four) hours as needed for Wheezing. Rescue 18 g 0    albuterol (PROVENTIL) 2.5 mg /3 mL (0.083 %) nebulizer solution Take 3 mLs (2.5 mg total) by nebulization every 4 to 6 hours as needed for Wheezing or Shortness of Breath. 360 mL 11    amoxicillin-clavulanate 875-125mg (AUGMENTIN) 875-125 mg per tablet Take 1 tablet by mouth every 12 (twelve) hours. 20 tablet 0    atorvastatin (LIPITOR) 20 MG tablet Take 20 mg by mouth.      azelastine (ASTELIN) 137 mcg (0.1 %) nasal spray 1 spray by Nasal route 2 (two) times daily.      FLUoxetine 20 MG capsule Take 1 capsule (20 mg total) by mouth once daily. 30 capsule 11    fluticasone propionate (FLONASE) 50 mcg/actuation nasal spray 2 sprays (100 mcg total) by Each Nostril route once daily. 16 g 4    loratadine (CLARITIN) 10 mg tablet Take 1 tablet (10 mg total) by mouth once daily. 30 tablet 11    ondansetron (ZOFRAN-ODT) 8  MG TbDL Take 8 mg by mouth.      pantoprazole (PROTONIX) 20 MG tablet Take 1 tablet (20 mg total) by mouth once daily. 90 tablet 3    potassium gluconate 595 (99) mg Tab Take 1 tablet by mouth Daily.      ipratropium (ATROVENT) 0.02 % nebulizer solution Take 2.5 mLs (500 mcg total) by nebulization 4 (four) times daily. Rescue 300 mL 11    iron polysacch complex-b12-fa 150-25-1 mg-mcg-mg (FERREX 150 FORTE) 150-25-1 mg-mcg-mg Cap Take 1 capsule by mouth once daily. 30 capsule 11    promethazine-codeine 6.25-10 mg/5 ml (PHENERGAN WITH CODEINE) 6.25-10 mg/5 mL syrup Take 5 mLs by mouth every 8 (eight) hours as needed for Cough. 240 mL 0    [DISCONTINUED] loratadine (CLARITIN) 10 mg tablet Take 1 tablet (10 mg total) by mouth once daily. 30 tablet 11     No facility-administered encounter medications on file as of 2/24/2023.     Plan:       Requested Prescriptions     Signed Prescriptions Disp Refills    promethazine-codeine 6.25-10 mg/5 ml (PHENERGAN WITH CODEINE) 6.25-10 mg/5 mL syrup 240 mL 0     Sig: Take 5 mLs by mouth every 8 (eight) hours as needed for Cough.    ipratropium (ATROVENT) 0.02 % nebulizer solution 300 mL 11     Sig: Take 2.5 mLs (500 mcg total) by nebulization 4 (four) times daily. Rescue    iron polysacch complex-b12-fa 150-25-1 mg-mcg-mg (FERREX 150 FORTE) 150-25-1 mg-mcg-mg Cap 30 capsule 11     Sig: Take 1 capsule by mouth once daily.     Problem List Items Addressed This Visit       Mass of upper lobe of left lung    Current Assessment & Plan     PET positive         Mediastinal lymphadenopathy    Relevant Orders    CT Biopsy Lymph Node    Neoplasm, metastatic - Primary    Current Assessment & Plan     Differential diagnosis lung cancer vs lymphoma  Discussed with radiology - biopsy of cervical lymph node probably best approach         Relevant Orders    CT Biopsy Lymph Node     Other Visit Diagnoses       Chronic cough        Relevant Medications    promethazine-codeine 6.25-10 mg/5 ml  (PHENERGAN WITH CODEINE) 6.25-10 mg/5 mL syrup    Simple chronic bronchitis        Relevant Medications    ipratropium (ATROVENT) 0.02 % nebulizer solution    Other iron deficiency anemia        Relevant Medications    iron polysacch complex-b12-fa 150-25-1 mg-mcg-mg (FERREX 150 FORTE) 150-25-1 mg-mcg-mg Cap             Follow up in about 2 weeks (around 3/10/2023) for Review progress.    MEDICAL DECISION MAKING: Moderate to high complexity.  Overall, the multiple problems listed are of moderate to high severity that may impact quality of life and activities of daily living. Side effects of medications, treatment plan as well as options and alternatives reviewed and discussed with patient. There was counseling of patient concerning these issues.    Total time spent in counseling and coordination of care - 60  minutes of total time spent on the encounter, which includes face to face time and non-face to face time preparing to see the patient (eg, review of tests), Obtaining and/or reviewing separately obtained history, Documenting clinical information in the electronic or other health record, Independently interpreting results (not separately reported) and communicating results to the patient/family/caregiver, or Care coordination (not separately reported).    Time was used in discussion of prognosis, risks, benefits of treatment, instructions and compliance with regimen . Discussion with other physicians and/or health care providers - home health or for use of durable medical equipment (oxygen, nebulizers, CPAP, BiPAP) occurred.

## 2023-02-24 NOTE — PATIENT INSTRUCTIONS
[facility]  [address] [place] [address]  (off [address])    Lung Biopsy Appointment    The radiologist will review your CT scan to determine when the lung biopsy will be scheduled. If you do not hear from the radiology department in 48 hours then call [phone].     This test is done in the Radiology Department on the 1st floor of [facility]    Lung Biopsy is performed to diagnose or determine the level of lung disease that cant be determined by regular lab testing.    Prior to Lung Biopsy: (VERY IMPORTANT-PLEASE READ)  You must be fasting: nothing to eat or drink after midnight the night before the test.  You must have a responsible adult to drive you home after procedure and stay with you the rest of the day.  Plan to stay approximately 4 hours after procedure is completed.  If you are on Coumadin, Heparin, Plavix, Aspirin or NSAIDS must be stopped 7 days prior to the procedure. (YOU MUST LET YOUR PHYSICIAN KNOW)  The ordering physician should order lab work to be done within 7 days of the procedure, to determine your blood clotting factor.  Diabetic patients: If you take oral glucose pills, dont take it the morning of the biopsy.    If you take insulin, take your normal dose as usual.  Blood pressure medications are to be taken the morning of procedure with a small sip of water at least 2 hours before biopsy.  Based on your medical condition, your physician may request other specific preparation.     During the test you will lie on your back with your right hand behind your head. You will need to lie completely still while the best spot to insert the biopsy needle is determine. Ultrasound, MRI, or CT may also be used to locate a specific spot in the lung. Then, your skin will be cleaned and a local anesthetic (numbing) will be applied to the skin. You will also be given a light sedation. A biopsy needle will be inserted and advanced to the surface of  the lung, where a tissue sample is obtained. After the biopsy you will remain lying on your right side for 2 hours. You may experience some pain around the site or in the shoulder. This isnt uncommon. Then, you will change position to lying on your back for 2-3 hours. You will be monitored closely during this time in recovery. You will be able to drink 2 hours and eat 4 hours after the procedure. The biopsy site will be dressed. A small amount of blood on dressing is normal.    What to expect when home for Lung Biopsy:  Bed rest for the rest of the day and evening.  Blood thinning medications can be resumed when instructed by prescribing physician.  Avoid heavy lifting and strenuous exercise for the next 2-3 days following the procedure.  You can resume your normal diet.  You wont be able to travel by airplane for up to 5 days after biopsy.    Contact doctors office or go to ER if you experience:  Excessive bleeding saturated dressing or bleeding that will not stop by biopsy site  Fever or chills  Severe pain in the abdomen  Difficulty breathing    If your biopsy needs to be rescheduled, please contact the Radiology Department at [phone].

## 2023-02-24 NOTE — ASSESSMENT & PLAN NOTE
Differential diagnosis lung cancer vs lymphoma  Discussed with radiology - biopsy of cervical lymph node probably best approach

## 2023-02-24 NOTE — H&P (VIEW-ONLY)
Subjective:     Patient ID: Dory Xie is a 73 y.o. female.    Chief Complaint:  Slow to resolve pneumonia     HPI 74 y/o nonsmoker with history of pneumonia 2022  Hospitalized at [facility] 2022 for pneumonia - records not available.  First episode of pneumonia  No prior history of asthma  Nonsmoker  Dyspnea  Patient complains of shortness of breath. Symptoms occur while getting dressed. Symptoms began 6 weeks ago, unchanged since. Associated symptoms include  difficulty breathing, dry cough, dyspnea on exertion, post nasal drip, and productive cough. She denies chest pain, located left chest. She does not have had recent travel. Weight has decreased 22 pounds over last few months . Symptoms are exacerbated by any exercise. Symptoms are alleviated by rest.     Both parents  of lung cancer - small cell  Sister with lymphoma    Past Medical History:   Diagnosis Date    Arthritis     Cancer     cervical    Chronic diastolic (congestive) heart failure 02/10/2023    Chronic pain     From osteoarthritis    Colitis     GERD (gastroesophageal reflux disease)     Seasonal allergies      Past Surgical History:   Procedure Laterality Date    COLONOSCOPY N/A 2016    Procedure: COLONOSCOPY;  Surgeon: Hector Summers III, MD;  Location: [facility];  Service: Endoscopy;  Laterality: N/A;    EYE SURGERY      HYSTERECTOMY      29yrs old    JOINT REPLACEMENT  Both knees    KNEE SURGERY Left     KNEE SURGERY Right     TONSILLECTOMY      TOTAL KNEE ARTHROPLASTY Bilateral 2019    TUBAL LIGATION       Review of patient's allergies indicates:  No Known Allergies  Current Outpatient Medications on File Prior to Visit   Medication Sig Dispense Refill    albuterol (PROVENTIL HFA) 90 mcg/actuation inhaler Inhale 2 puffs into the lungs every 4 (four) hours as needed for Wheezing. Rescue 18 g 0    albuterol (PROVENTIL) 2.5 mg /3 mL (0.083 %) nebulizer solution Take 3 mLs (2.5 mg total) by  nebulization every 4 to 6 hours as needed for Wheezing or Shortness of Breath. 360 mL 11    amoxicillin-clavulanate 875-125mg (AUGMENTIN) 875-125 mg per tablet Take 1 tablet by mouth every 12 (twelve) hours. 20 tablet 0    atorvastatin (LIPITOR) 20 MG tablet Take 20 mg by mouth.      azelastine (ASTELIN) 137 mcg (0.1 %) nasal spray 1 spray by Nasal route 2 (two) times daily.      FLUoxetine 20 MG capsule Take 1 capsule (20 mg total) by mouth once daily. 30 capsule 11    fluticasone propionate (FLONASE) 50 mcg/actuation nasal spray 2 sprays (100 mcg total) by Each Nostril route once daily. 16 g 4    loratadine (CLARITIN) 10 mg tablet Take 1 tablet (10 mg total) by mouth once daily. 30 tablet 11    ondansetron (ZOFRAN-ODT) 8 MG TbDL Take 8 mg by mouth.      pantoprazole (PROTONIX) 20 MG tablet Take 1 tablet (20 mg total) by mouth once daily. 90 tablet 3    potassium gluconate 595 (99) mg Tab Take 1 tablet by mouth Daily.       No current facility-administered medications on file prior to visit.     Social History     Socioeconomic History    Marital status: Single   Tobacco Use    Smoking status: Never     Passive exposure: Never    Smokeless tobacco: Never   Substance and Sexual Activity    Alcohol use: No    Drug use: No     Family History   Problem Relation Age of Onset    Diabetes Mother     Heart disease Mother     Cancer Mother         lung    Cancer Father         Lung    Hyperlipidemia Father     Hypertension Other     Breast cancer Sister        Review of Systems   Constitutional:  Positive for fatigue. Negative for fever.   HENT:  Positive for postnasal drip, rhinorrhea and congestion.    Eyes:  Negative for redness and itching.   Respiratory:  Positive for cough, sputum production, shortness of breath, dyspnea on extertion, use of rescue inhaler and Paroxysmal Nocturnal Dyspnea.    Cardiovascular:  Negative for chest pain, palpitations and leg swelling.   Genitourinary:  Negative for difficulty urinating  "and hematuria.   Endocrine:  Negative for cold intolerance and heat intolerance.    Musculoskeletal:  Positive for arthralgias.   Skin:  Negative for rash.   Gastrointestinal:  Negative for nausea and abdominal pain.   Neurological:  Negative for dizziness, syncope, weakness and light-headedness.   Hematological:  Negative for adenopathy. Does not bruise/bleed easily.   Psychiatric/Behavioral:  Negative for sleep disturbance. The patient is not nervous/anxious.      Objective:      /78   Pulse 90   Resp 18   Ht 5' 3" (1.6 m)   Wt 91.3 kg (201 lb 6.2 oz)   SpO2 96%   BMI 35.67 kg/m²   Physical Exam  Vitals and nursing note reviewed.   Constitutional:       Appearance: She is well-developed. She is obese.   HENT:      Head: Normocephalic and atraumatic.      Nose: Nose normal.      Mouth/Throat:      Pharynx: No oropharyngeal exudate.   Eyes:      Conjunctiva/sclera: Conjunctivae normal.      Pupils: Pupils are equal, round, and reactive to light.   Neck:      Thyroid: No thyromegaly.      Vascular: No JVD.      Trachea: No tracheal deviation.   Cardiovascular:      Rate and Rhythm: Normal rate and regular rhythm.      Heart sounds: Normal heart sounds.   Pulmonary:      Effort: Pulmonary effort is normal. No respiratory distress.      Breath sounds: Examination of the right-lower field reveals wheezing. Examination of the left-lower field reveals wheezing. Decreased breath sounds and wheezing present. No rhonchi or rales.   Chest:      Chest wall: No tenderness.   Abdominal:      General: Bowel sounds are normal.      Palpations: Abdomen is soft.   Musculoskeletal:         General: Normal range of motion.      Cervical back: Neck supple.   Lymphadenopathy:      Cervical: No cervical adenopathy.   Skin:     General: Skin is warm and dry.   Neurological:      Mental Status: She is alert and oriented to person, place, and time.     Personal Diagnostic Review  CT reviewed  PET:    Details    Reading Physician " Reading Date Result Priority   Errol Bland MD  [phone] 2/22/2023 Routine     Narrative & Impression  EXAMINATION:  NM PET CT ROUTINE     CLINICAL HISTORY:  Lung nodule, > 8mm; Other nonspecific abnormal finding of lung field     TECHNIQUE:  11.61 mCi of F18-FDG was administered intravenously in the right antecubital fossa.  After an approximately 60 min distribution time, PET/CT images were acquired from the skull base to the mid thigh. Transmission images were acquired to correct for attenuation using a whole body low-dose CT scan without contrast with the arms positioned above the head.     COMPARISON:  Chest CT, 02/08/2023     FINDINGS:  Quality of the study: Adequate.     SUV max of the liver parenchyma is 3.3.     Neck: Multiple lower bilateral jugular chain lymph nodes with marked FDG avidity with SUV max 16.  The largest lymph node is a left supraclavicular lymph node measuring 1.8 x 1.5 cm.     Chest: Bulky mediastinal lymphadenopathy in the superior mediastinum and paratracheal region and subcarinal region with SUV max 23.  The subcarinal lymph node measures 3.2 x 2.4 cm.     Large left hilar mass which extends into the left upper lobe with marked FDG avidity with SUV max 26.     Large right hilar mass/consolidation which extends the right middle lobe with SUV max 16.     Abdomen/pelvis: Small epigastric lymph node with moderate FDG avidity with SUV max 6.1.  Small paola hepatis lymph node with SUV max 10.0.     Left pelvic sidewall small lymph node with SUV max 11.  Left common femoral chain lymph node with SUV max 12.     There are couple of small lymph nodes anterior to the left sartorius muscle which show marked FDG avidity with SUV max 7.3.     Osseous structures: No focal abnormal FDG avidity in the skeleton.     Physiologic uptake of the tracer is present within the brain, salivary glands, myocardium, GI and  tracts.     Incidental CT findings: N/A     Impression:     1. Extensive  metastatic disease.  2. Large consolidation/masses in the lungs bilaterally with marked FDG avidity.  3. Metastatic markedly FDG avid hilar and mediastinal and cervical chain/supraclavicular lymphadenopathy.  4. Metastatic markedly FDG avid lymphadenopathy below the diaphragm in the epigastric region and paola hepatis and left pelvic sidewall and left common femoral chain.  Also small markedly FDG avid nodule/lymph nodes immediately anterior to the left thigh sartorius muscle.  All CT scans at this facility are performed  using dose modulation techniques as appropriate to performed exam including the following:  automated exposure control; adjustment of mA and/or kV according to the patients size (this includes techniques or standardized protocols for targeted exams where dose is matched to indication/reason for exam: i.e. extremities or head);  iterative reconstruction technique.        Electronically signed by: Errol Bland MD  Date:                                            02/22/2023  Time:                                           15:58           Exam Ended: 02/22/23 15:08 Last Resulted: 02/22/23 15:58       Order Details     View Encounter     Lab and Collection Details     Routing     Result History     View Encounter Conversation           Result Care Coordination      Patient Communication     Add Comments   Seen Back to Top             NM PET CT Routine Skull to Mid Thigh: Patient Communication     Add Comments   Seen     External Result Report    External Result Report     Narrative & Impression    EXAMINATION:  NM PET CT ROUTINE     CLINICAL HISTORY:  Lung nodule, > 8mm; Other nonspecific abnormal finding of lung field     TECHNIQUE:  11.61 mCi of F18-FDG was administered intravenously in the right antecubital fossa.  After an approximately 60 min distribution time, PET/CT images were acquired from the skull base to the mid thigh. Transmission images were acquired to correct for attenuation using a  whole body low-dose CT scan without contrast with the arms positioned above the head.     COMPARISON:  Chest CT, 02/08/2023     FINDINGS:  Quality of the study: Adequate.     SUV max of the liver parenchyma is 3.3.     Neck: Multiple lower bilateral jugular chain lymph nodes with marked FDG avidity with SUV max 16.  The largest lymph node is a left supraclavicular lymph node measuring 1.8 x 1.5 cm.     Chest: Bulky mediastinal lymphadenopathy in the superior mediastinum and paratracheal region and subcarinal region with SUV max 23.  The subcarinal lymph node measures 3.2 x 2.4 cm.     Large left hilar mass which extends into the left upper lobe with marked FDG avidity with SUV max 26.     Large right hilar mass/consolidation which extends the right middle lobe with SUV max 16.     Abdomen/pelvis: Small epigastric lymph node with moderate FDG avidity with SUV max 6.1.  Small paola hepatis lymph node with SUV max 10.0.     Left pelvic sidewall small lymph node with SUV max 11.  Left common femoral chain lymph node with SUV max 12.     There are couple of small lymph nodes anterior to the left sartorius muscle which show marked FDG avidity with SUV max 7.3.     Osseous structures: No focal abnormal FDG avidity in the skeleton.     Physiologic uptake of the tracer is present within the brain, salivary glands, myocardium, GI and  tracts.     Incidental CT findings: N/A     Impression:     1. Extensive metastatic disease.  2. Large consolidation/masses in the lungs bilaterally with marked FDG avidity.  3. Metastatic markedly FDG avid hilar and mediastinal and cervical chain/supraclavicular lymphadenopathy.  4. Metastatic markedly FDG avid lymphadenopathy below the diaphragm in the epigastric region and paola hepatis and left pelvic sidewall and left common femoral chain.  Also small markedly FDG avid nodule/lymph nodes immediately anterior to the left thigh sartorius muscle.  All CT scans at this facility are performed   using dose modulation techniques as appropriate to performed exam including the following:  automated exposure control; adjustment of mA and/or kV according to the patients size (this includes techniques or standardized protocols for targeted exams where dose is matched to indication/reason for exam: i.e. extremities or head);  iterative reconstruction technique.        Electronically signed by: Errol Bland MD  Date:                                            02/22/2023  Time:                                           15:58          Pulmonary Studies Review 2/24/2023   SpO2 96   Height 63   Weight 3222.24   BMI (Calculated) 35.7   Predicted Distance 223.81   Predicted Distance Meters (Calculated) 367.69       Echo Saline Bubble? No  · The left ventricle is normal in size with concentric remodeling and   normal systolic function.  · The estimated ejection fraction is 65%.  · Normal left ventricular diastolic function.  · Normal right ventricular size with normal right ventricular systolic   function.  · There is mild aortic valve stenosis.  · Aortic valve area is 1.92 cm2; peak velocity is 2.08 m/s; mean gradient   is 10 mmHg.  · Mild tricuspid regurgitation.  · Normal central venous pressure (3 mmHg).  · The estimated PA systolic pressure is 22 mmHg.  · Trivial posterior pericardial effusion.         Office Spirometry Results:     No flowsheet data found.  Pulmonary Studies Review 2/24/2023   SpO2 96   Height 63   Weight 3222.24   BMI (Calculated) 35.7   Predicted Distance 223.81   Predicted Distance Meters (Calculated) 367.69         Assessment:       Mass of upper lobe of left lung  PET positive    Neoplasm, metastatic  Differential diagnosis lung cancer vs lymphoma  Discussed with radiology - biopsy of cervical lymph node probably best approach      Malignant neoplasm metastatic to other site  -     Cancel: CT Biopsy Lung w/ guidance; Future; Expected date: 02/24/2023  -     CT Biopsy Lymph Node; Future;  Expected date: 02/24/2023    Mass of upper lobe of left lung    Mediastinal lymphadenopathy  -     Cancel: CT Biopsy Lung w/ guidance; Future; Expected date: 02/24/2023  -     CT Biopsy Lymph Node; Future; Expected date: 02/24/2023    Chronic cough  -     promethazine-codeine 6.25-10 mg/5 ml (PHENERGAN WITH CODEINE) 6.25-10 mg/5 mL syrup; Take 5 mLs by mouth every 8 (eight) hours as needed for Cough.  Dispense: 240 mL; Refill: 0    Simple chronic bronchitis  -     ipratropium (ATROVENT) 0.02 % nebulizer solution; Take 2.5 mLs (500 mcg total) by nebulization 4 (four) times daily. Rescue  Dispense: 300 mL; Refill: 11    Other iron deficiency anemia  -     iron polysacch complex-b12-fa 150-25-1 mg-mcg-mg (FERREX 150 FORTE) 150-25-1 mg-mcg-mg Cap; Take 1 capsule by mouth once daily.  Dispense: 30 capsule; Refill: 11    Other orders  -     Influenza - Quadrivalent (Adjuvanted)          Outpatient Encounter Medications as of 2/24/2023   Medication Sig Dispense Refill    albuterol (PROVENTIL HFA) 90 mcg/actuation inhaler Inhale 2 puffs into the lungs every 4 (four) hours as needed for Wheezing. Rescue 18 g 0    albuterol (PROVENTIL) 2.5 mg /3 mL (0.083 %) nebulizer solution Take 3 mLs (2.5 mg total) by nebulization every 4 to 6 hours as needed for Wheezing or Shortness of Breath. 360 mL 11    amoxicillin-clavulanate 875-125mg (AUGMENTIN) 875-125 mg per tablet Take 1 tablet by mouth every 12 (twelve) hours. 20 tablet 0    atorvastatin (LIPITOR) 20 MG tablet Take 20 mg by mouth.      azelastine (ASTELIN) 137 mcg (0.1 %) nasal spray 1 spray by Nasal route 2 (two) times daily.      FLUoxetine 20 MG capsule Take 1 capsule (20 mg total) by mouth once daily. 30 capsule 11    fluticasone propionate (FLONASE) 50 mcg/actuation nasal spray 2 sprays (100 mcg total) by Each Nostril route once daily. 16 g 4    loratadine (CLARITIN) 10 mg tablet Take 1 tablet (10 mg total) by mouth once daily. 30 tablet 11    ondansetron (ZOFRAN-ODT) 8  MG TbDL Take 8 mg by mouth.      pantoprazole (PROTONIX) 20 MG tablet Take 1 tablet (20 mg total) by mouth once daily. 90 tablet 3    potassium gluconate 595 (99) mg Tab Take 1 tablet by mouth Daily.      ipratropium (ATROVENT) 0.02 % nebulizer solution Take 2.5 mLs (500 mcg total) by nebulization 4 (four) times daily. Rescue 300 mL 11    iron polysacch complex-b12-fa 150-25-1 mg-mcg-mg (FERREX 150 FORTE) 150-25-1 mg-mcg-mg Cap Take 1 capsule by mouth once daily. 30 capsule 11    promethazine-codeine 6.25-10 mg/5 ml (PHENERGAN WITH CODEINE) 6.25-10 mg/5 mL syrup Take 5 mLs by mouth every 8 (eight) hours as needed for Cough. 240 mL 0    [DISCONTINUED] loratadine (CLARITIN) 10 mg tablet Take 1 tablet (10 mg total) by mouth once daily. 30 tablet 11     No facility-administered encounter medications on file as of 2/24/2023.     Plan:       Requested Prescriptions     Signed Prescriptions Disp Refills    promethazine-codeine 6.25-10 mg/5 ml (PHENERGAN WITH CODEINE) 6.25-10 mg/5 mL syrup 240 mL 0     Sig: Take 5 mLs by mouth every 8 (eight) hours as needed for Cough.    ipratropium (ATROVENT) 0.02 % nebulizer solution 300 mL 11     Sig: Take 2.5 mLs (500 mcg total) by nebulization 4 (four) times daily. Rescue    iron polysacch complex-b12-fa 150-25-1 mg-mcg-mg (FERREX 150 FORTE) 150-25-1 mg-mcg-mg Cap 30 capsule 11     Sig: Take 1 capsule by mouth once daily.     Problem List Items Addressed This Visit       Mass of upper lobe of left lung    Current Assessment & Plan     PET positive         Mediastinal lymphadenopathy    Relevant Orders    CT Biopsy Lymph Node    Neoplasm, metastatic - Primary    Current Assessment & Plan     Differential diagnosis lung cancer vs lymphoma  Discussed with radiology - biopsy of cervical lymph node probably best approach         Relevant Orders    CT Biopsy Lymph Node     Other Visit Diagnoses       Chronic cough        Relevant Medications    promethazine-codeine 6.25-10 mg/5 ml  (PHENERGAN WITH CODEINE) 6.25-10 mg/5 mL syrup    Simple chronic bronchitis        Relevant Medications    ipratropium (ATROVENT) 0.02 % nebulizer solution    Other iron deficiency anemia        Relevant Medications    iron polysacch complex-b12-fa 150-25-1 mg-mcg-mg (FERREX 150 FORTE) 150-25-1 mg-mcg-mg Cap             Follow up in about 2 weeks (around 3/10/2023) for Review progress.    MEDICAL DECISION MAKING: Moderate to high complexity.  Overall, the multiple problems listed are of moderate to high severity that may impact quality of life and activities of daily living. Side effects of medications, treatment plan as well as options and alternatives reviewed and discussed with patient. There was counseling of patient concerning these issues.    Total time spent in counseling and coordination of care - 60  minutes of total time spent on the encounter, which includes face to face time and non-face to face time preparing to see the patient (eg, review of tests), Obtaining and/or reviewing separately obtained history, Documenting clinical information in the electronic or other health record, Independently interpreting results (not separately reported) and communicating results to the patient/family/caregiver, or Care coordination (not separately reported).    Time was used in discussion of prognosis, risks, benefits of treatment, instructions and compliance with regimen . Discussion with other physicians and/or health care providers - home health or for use of durable medical equipment (oxygen, nebulizers, CPAP, BiPAP) occurred.

## 2023-02-28 ENCOUNTER — TELEPHONE (OUTPATIENT)
Dept: PULMONOLOGY | Facility: CLINIC | Age: 73
End: 2023-02-28
Payer: MEDICARE

## 2023-02-28 ENCOUNTER — NURSE TRIAGE (OUTPATIENT)
Dept: ADMINISTRATIVE | Facility: CLINIC | Age: 73
End: 2023-02-28
Payer: MEDICARE

## 2023-02-28 DIAGNOSIS — J41.0 SIMPLE CHRONIC BRONCHITIS: ICD-10-CM

## 2023-02-28 DIAGNOSIS — R06.09 DOE (DYSPNEA ON EXERTION): Primary | ICD-10-CM

## 2023-02-28 DIAGNOSIS — R91.8 MASS OF UPPER LOBE OF LEFT LUNG: ICD-10-CM

## 2023-02-28 NOTE — TELEPHONE ENCOUNTER
OOC Rn  Carla, daughter, wants to talk to  Wyatt Tavares  RN need speak to her   for the 3/9/23   Wants it done sooner.   We need the bx done earlier due to her  Oxygen level 88%  RA,   92%       Wheezing,     inhalers,  Nebulizer.  Family would like to know why mom does not have oxygen for home use when needed?   Care advise is to go to ED now, daughter states she wants to hear from Dr. Baca First.  How come her procedure cant be moved up?

## 2023-02-28 NOTE — TELEPHONE ENCOUNTER
"OOC Rn  Carla, daughter, wants to talk to  Wyatt Tavares  RN need speak to her   for the 3/9/23   Wants it done sooner.   We need the bx done earlier due to her  Oxygen level 88%  RA,   92%       Wheezing,     inhalers,  Nebulizer.  Family would like to know why mom does not have oxygen for home use when needed?   Care advise is to go to ED now, daughter states she wants to hear from Dr. Baca First.  How come her procedure cant be moved up?    Reason for Disposition   Cancer treatment in past six months (or has cancer now)    Additional Information   Negative: SEVERE difficulty breathing (e.g., struggling for each breath, speaks in single words, pulse > 120)   Negative: Breathing stopped and hasn't returned   Negative: Choking on something   Negative: Bluish (or gray) lips or face   Negative: Difficult to awaken or acting confused (e.g., disoriented, slurred speech)   Negative: Passed out (i.e., fainted, collapsed and was not responding)   Negative: Wheezing started suddenly after medicine, an allergic food, or bee sting   Negative: Stridor   Negative: Slow, shallow and weak breathing   Negative: Sounds like a life-threatening emergency to the triager   Negative: MODERATE difficulty breathing (e.g., speaks in phrases, SOB even at rest, pulse 100-120) of new-onset or worse than normal   Negative: Oxygen level (e.g., pulse oximetry) 90 percent or lower   Negative: Wheezing can be heard across the room   Negative: Drooling or spitting out saliva (because can't swallow)   Negative: Any history of prior "blood clot" in leg or lungs   Negative: Illness requiring prolonged bedrest in past month (e.g., immobilization, long hospital stay)   Negative: Hip or leg fracture (broken bone) in past month (or had cast on leg or ankle in past month)   Negative: Major surgery in the past month   Negative: Long-distance travel in past month (e.g., car, bus, train, plane; with trip lasting 6 or more hours)    Protocols " used: Breathing Difficulty-A-[place]

## 2023-02-28 NOTE — TELEPHONE ENCOUNTER
Called and spoke with Ms. Dory. She advised that she is feeling much better today. Patient sounded at her baseline. She advises that pulmonology is going to see her tomorrow and do testing on her. We discussed when to go to the ER. She verbalized understanding.

## 2023-02-28 NOTE — TELEPHONE ENCOUNTER
----- Message from Katya Hung sent at 2/28/2023  8:41 AM CST -----  Pt's daughter called regarding the upcoming appt for a biopsy and to discuss how low her oxygen level is. Call Carla back at [phone]. Thx. EL

## 2023-03-01 ENCOUNTER — HOSPITAL ENCOUNTER (OUTPATIENT)
Dept: RADIOLOGY | Facility: HOSPITAL | Age: 73
Discharge: HOME | End: 2023-03-01
Attending: INTERNAL MEDICINE
Payer: MEDICARE

## 2023-03-01 ENCOUNTER — CLINICAL SUPPORT (OUTPATIENT)
Dept: PULMONOLOGY | Facility: CLINIC | Age: 73
End: 2023-03-01
Payer: MEDICARE

## 2023-03-01 VITALS — WEIGHT: 201.25 LBS | HEIGHT: 63 IN | BODY MASS INDEX: 35.66 KG/M2

## 2023-03-01 DIAGNOSIS — C79.89 MALIGNANT NEOPLASM METASTATIC TO OTHER SITE: ICD-10-CM

## 2023-03-01 DIAGNOSIS — R06.09 DOE (DYSPNEA ON EXERTION): ICD-10-CM

## 2023-03-01 DIAGNOSIS — R91.8 MASS OF UPPER LOBE OF LEFT LUNG: ICD-10-CM

## 2023-03-01 DIAGNOSIS — R59.0 MEDIASTINAL LYMPHADENOPATHY: ICD-10-CM

## 2023-03-01 DIAGNOSIS — J41.0 SIMPLE CHRONIC BRONCHITIS: ICD-10-CM

## 2023-03-01 PROCEDURE — 88173 CYTOPATH EVAL FNA REPORT: CPT | Performed by: PATHOLOGY

## 2023-03-01 PROCEDURE — 88173 CYTOPATH EVAL FNA REPORT: CPT | Mod: 26,,, | Performed by: PATHOLOGY

## 2023-03-01 PROCEDURE — 99999 PR PBB SHADOW E&M-EST. PATIENT-LVL I: ICD-10-PCS | Mod: PBBFAC,,,

## 2023-03-01 PROCEDURE — 10005 FNA BX W/US GDN 1ST LES: CPT

## 2023-03-01 PROCEDURE — 10005 FNA BX W/US GDN 1ST LES: CPT | Mod: ,,, | Performed by: RADIOLOGY

## 2023-03-01 PROCEDURE — 99999 PR PBB SHADOW E&M-EST. PATIENT-LVL I: CPT | Mod: PBBFAC,,,

## 2023-03-01 PROCEDURE — 88189 PR  FLOWCYTOMETRY/READ, 16 & > MARKERS: ICD-10-PCS | Mod: ,,, | Performed by: PATHOLOGY

## 2023-03-01 PROCEDURE — 10005 US FINE NEEDLE ASPIRATION BIOPSY, FIRST LESION: ICD-10-PCS | Mod: ,,, | Performed by: RADIOLOGY

## 2023-03-01 PROCEDURE — 88189 FLOWCYTOMETRY/READ 16 & >: CPT | Mod: ,,, | Performed by: PATHOLOGY

## 2023-03-01 PROCEDURE — 94618 PULMONARY STRESS TESTING: ICD-10-PCS | Mod: S$GLB,,, | Performed by: INTERNAL MEDICINE

## 2023-03-01 PROCEDURE — 94618 PULMONARY STRESS TESTING: CPT | Mod: S$GLB,,, | Performed by: INTERNAL MEDICINE

## 2023-03-01 PROCEDURE — 88184 FLOWCYTOMETRY/ TC 1 MARKER: CPT | Performed by: PATHOLOGY

## 2023-03-01 PROCEDURE — 88173 PR  INTERPRETATION OF FNA SMEAR: ICD-10-PCS | Mod: 26,,, | Performed by: PATHOLOGY

## 2023-03-01 PROCEDURE — 88185 FLOWCYTOMETRY/TC ADD-ON: CPT | Mod: 59 | Performed by: PATHOLOGY

## 2023-03-01 NOTE — PROCEDURES
"[facility] - Pulmonary Function  Six Minute Walk     SUMMARY     Ordering Provider: Dr. Baca   Interpreting Provider: Dr. Baca  Performing nurse/tech/RT: PATRICE Jacob RRT  Diagnosis:  (CONKLIN)  Height: 5' 3" (160 cm)  Weight: 91.3 kg (201 lb 4.5 oz)  BMI (Calculated): 35.7   Patient Race:             Phase Oxygen Assessment Supplemental O2 Heart   Rate Blood Pressure Kings Dyspnea Scale Rating   Resting 93 % Room Air 96 bpm 102/51 1   Exercise        Minute        1 91 % Room Air 105 bpm     2 92 % Room Air 112 bpm     3 91 % Room Air 110 bpm     4 88 % (90% when placed on 2L) Room Air (placed on 2L) 114 bpm     5 94 % 2 L/M 100 bpm     6  96 % 2 L/M 101 bpm 124/54 4   Recovery        Minute        1 94 % 2 L/M 100 bpm     2 96 % 2 L/M 85 bpm     3 96 % 2 L/M 85 bpm     4 98 % 2 L/M 83 bpm 124/54 1     Six Minute Walk Summary  6MWT Status: completed with stops  Patient Reported: Dizziness, Dyspnea     Interpretation:  Did the patient stop or pause?: Yes  How many times did the patient stop or pause?: 1  Stop Time 1: 256  Restart Time 1: 328  Pause Time 1: 72 seconds                             Total Time Walked (Calculated): 288 seconds  Final Partial Lap Distance (feet): 0 feet  Total Distance Meters (Calculated): 243.84 meters  Predicted Distance Meters (Calculated): 367.8 meters  Percentage of Predicted (Calculated): 66.3  Peak VO2 (Calculated): 11.3  Mets: 3.23  Has The Patient Had a Previous Six Minute Walk Test?: No       Previous 6MWT Results  Has The Patient Had a Previous Six Minute Walk Test?: No      Interpretation:  Total distance walked in six minutes is mildly reduced indicating an mild reduction in functional capacity.  There was significant severe oxygen desaturation to 88 % with exercise on room air (oxygen saturation less than 89%). Supplemental oxygen was administered for the remainder of the test as noted above. Clinical correlation suggested.  Patient met criteria for oxygen prescription.  [] " Mild exercise-induced hypoxemia described as an arterial oxygen saturation of 93-95% (with a fall of 3-4% with exercise),   [] Moderate exercise-induced hypoxemia as a fall in oxygen saturation to  89-93% (with a fall of 3-4 % with exercise)  [x] Severe exercise induced hypoxemia as < 89% O2 saturation (88% and below).  Medicare Criteria for Oxygen prescription comments: When arterial oxygen saturation is at or below 88% during exercise (severe exercise induced hypoxemia) then the patient meets criteria for oxygen prescription.  Details about Medicare Group Criteria coverage can be found at http://www.cms.[facility].gov/manuals/downloads/     Wyatt Baca MD

## 2023-03-01 NOTE — DISCHARGE SUMMARY
[facility] - Lab & Imaging (Hospital)  Discharge Note  Short Stay    US FNA Biopsy w/ Imaging 1st Lesion      OUTCOME: Patient tolerated treatment/procedure well without complication and is now ready for discharge.    DISPOSITION: Home or Self Care    FINAL DIAGNOSIS:  <principal problem not specified>    FOLLOWUP: In clinic    DISCHARGE INSTRUCTIONS:  No discharge procedures on file.     TIME SPENT ON DISCHARGE: 15 minutes

## 2023-03-02 ENCOUNTER — PATIENT MESSAGE (OUTPATIENT)
Dept: PULMONOLOGY | Facility: CLINIC | Age: 73
End: 2023-03-02
Payer: MEDICARE

## 2023-03-02 DIAGNOSIS — R09.02 EXERCISE HYPOXEMIA: Primary | ICD-10-CM

## 2023-03-02 DIAGNOSIS — C79.89 MALIGNANT NEOPLASM METASTATIC TO OTHER SITE: ICD-10-CM

## 2023-03-02 NOTE — PROGRESS NOTES
Order for oxygen submitted  Subjective:     Patient ID: Dory Xie is a 73 y.o. female.    Chief Complaint:  Slow to resolve pneumonia     HPI 74 y/o nonsmoker with history of pneumonia 2022  Hospitalized at [facility] 2022 for pneumonia - records not available.  First episode of pneumonia  No prior history of asthma  Nonsmoker  Dyspnea  Patient complains of shortness of breath. Symptoms occur while getting dressed. Symptoms began 6 weeks ago, unchanged since. Associated symptoms include  difficulty breathing, dry cough, dyspnea on exertion, post nasal drip, and productive cough. She denies chest pain, located left chest. She does not have had recent travel. Weight has decreased 22 pounds over last few months . Symptoms are exacerbated by any exercise. Symptoms are alleviated by rest.     Both parents  of lung cancer - small cell  Sister with lymphoma    Past Medical History:   Diagnosis Date    Arthritis     Cancer     cervical    Chronic diastolic (congestive) heart failure 02/10/2023    Chronic pain     From osteoarthritis    Colitis     GERD (gastroesophageal reflux disease)     Seasonal allergies      Past Surgical History:   Procedure Laterality Date    COLONOSCOPY N/A 2016    Procedure: COLONOSCOPY;  Surgeon: Hector Summers III, MD;  Location: [facility];  Service: Endoscopy;  Laterality: N/A;    EYE SURGERY      HYSTERECTOMY      29yrs old    JOINT REPLACEMENT  Both knees    KNEE SURGERY Left     KNEE SURGERY Right     TONSILLECTOMY      TOTAL KNEE ARTHROPLASTY Bilateral 2019    TUBAL LIGATION       Review of patient's allergies indicates:  No Known Allergies  Current Outpatient Medications on File Prior to Visit   Medication Sig Dispense Refill    albuterol (PROVENTIL HFA) 90 mcg/actuation inhaler Inhale 2 puffs into the lungs every 4 (four) hours as needed for Wheezing. Rescue 18 g 0    albuterol (PROVENTIL) 2.5 mg /3 mL (0.083 %) nebulizer solution Take 3 mLs  (2.5 mg total) by nebulization every 4 to 6 hours as needed for Wheezing or Shortness of Breath. 360 mL 11    amoxicillin-clavulanate 875-125mg (AUGMENTIN) 875-125 mg per tablet Take 1 tablet by mouth every 12 (twelve) hours. 20 tablet 0    atorvastatin (LIPITOR) 20 MG tablet Take 20 mg by mouth.      azelastine (ASTELIN) 137 mcg (0.1 %) nasal spray 1 spray by Nasal route 2 (two) times daily.      FLUoxetine 20 MG capsule Take 1 capsule (20 mg total) by mouth once daily. 30 capsule 11    fluticasone propionate (FLONASE) 50 mcg/actuation nasal spray 2 sprays (100 mcg total) by Each Nostril route once daily. 16 g 4    ipratropium (ATROVENT) 0.02 % nebulizer solution Take 2.5 mLs (500 mcg total) by nebulization 4 (four) times daily. Rescue 300 mL 11    iron polysacch complex-b12-fa 150-25-1 mg-mcg-mg (FERREX 150 FORTE) 150-25-1 mg-mcg-mg Cap Take 1 capsule by mouth once daily. 30 capsule 11    loratadine (CLARITIN) 10 mg tablet Take 1 tablet (10 mg total) by mouth once daily. 30 tablet 11    ondansetron (ZOFRAN-ODT) 8 MG TbDL Take 8 mg by mouth.      pantoprazole (PROTONIX) 20 MG tablet Take 1 tablet (20 mg total) by mouth once daily. 90 tablet 3    potassium gluconate 595 (99) mg Tab Take 1 tablet by mouth Daily.      promethazine-codeine 6.25-10 mg/5 ml (PHENERGAN WITH CODEINE) 6.25-10 mg/5 mL syrup Take 5 mLs by mouth every 8 (eight) hours as needed for Cough. 240 mL 0     No current facility-administered medications on file prior to visit.     Social History     Socioeconomic History    Marital status: Single   Tobacco Use    Smoking status: Never     Passive exposure: Never    Smokeless tobacco: Never   Substance and Sexual Activity    Alcohol use: No    Drug use: No     Family History   Problem Relation Age of Onset    Diabetes Mother     Heart disease Mother     Cancer Mother         lung    Cancer Father         Lung    Hyperlipidemia Father     Hypertension Other     Breast cancer Sister        Review of  Systems   Constitutional:  Positive for fatigue. Negative for fever.   HENT:  Positive for postnasal drip, rhinorrhea and congestion.    Eyes:  Negative for redness and itching.   Respiratory:  Positive for cough, sputum production, shortness of breath, dyspnea on extertion, use of rescue inhaler and Paroxysmal Nocturnal Dyspnea.    Cardiovascular:  Negative for chest pain, palpitations and leg swelling.   Genitourinary:  Negative for difficulty urinating and hematuria.   Endocrine:  Negative for cold intolerance and heat intolerance.    Musculoskeletal:  Positive for arthralgias.   Skin:  Negative for rash.   Gastrointestinal:  Negative for nausea and abdominal pain.   Neurological:  Negative for dizziness, syncope, weakness and light-headedness.   Hematological:  Negative for adenopathy. Does not bruise/bleed easily.   Psychiatric/Behavioral:  Negative for sleep disturbance. The patient is not nervous/anxious.      Objective:      There were no vitals taken for this visit.  Physical Exam  Vitals and nursing note reviewed.   Constitutional:       Appearance: She is well-developed. She is obese.   HENT:      Head: Normocephalic and atraumatic.      Nose: Nose normal.      Mouth/Throat:      Pharynx: No oropharyngeal exudate.   Eyes:      Conjunctiva/sclera: Conjunctivae normal.      Pupils: Pupils are equal, round, and reactive to light.   Neck:      Thyroid: No thyromegaly.      Vascular: No JVD.      Trachea: No tracheal deviation.   Cardiovascular:      Rate and Rhythm: Normal rate and regular rhythm.      Heart sounds: Normal heart sounds.   Pulmonary:      Effort: Pulmonary effort is normal. No respiratory distress.      Breath sounds: Examination of the right-lower field reveals wheezing. Examination of the left-lower field reveals wheezing. Decreased breath sounds and wheezing present. No rhonchi or rales.   Chest:      Chest wall: No tenderness.   Abdominal:      General: Bowel sounds are normal.       "Palpations: Abdomen is soft.   Musculoskeletal:         General: Normal range of motion.      Cervical back: Neck supple.   Lymphadenopathy:      Cervical: No cervical adenopathy.   Skin:     General: Skin is warm and dry.   Neurological:      Mental Status: She is alert and oriented to person, place, and time.     Personal Diagnostic Review    [facility] - Pulmonary Function  Six Minute Walk      SUMMARY      Ordering Provider: Dr. Baca              Interpreting Provider: Dr. Baca  Performing nurse/tech/RT: PATRICE Jacob RRT  Diagnosis:  (CONKLIN)  Height: 5' 3" (160 cm)  Weight: 91.3 kg (201 lb 4.5 oz)  BMI (Calculated): 35.7              Patient Race:                                                                Phase Oxygen Assessment Supplemental O2 Heart   Rate Blood Pressure Kings Dyspnea Scale Rating   Resting 93 % Room Air 96 bpm 102/51 1   Exercise             Minute             1 91 % Room Air 105 bpm       2 92 % Room Air 112 bpm       3 91 % Room Air 110 bpm       4 88 % (90% when placed on 2L) Room Air (placed on 2L) 114 bpm       5 94 % 2 L/M 100 bpm       6  96 % 2 L/M 101 bpm 124/54 4   Recovery             Minute             1 94 % 2 L/M 100 bpm       2 96 % 2 L/M 85 bpm       3 96 % 2 L/M 85 bpm       4 98 % 2 L/M 83 bpm 124/54 1      Six Minute Walk Summary  6MWT Status: completed with stops  Patient Reported: Dizziness, Dyspnea                Interpretation:  Did the patient stop or pause?: Yes  How many times did the patient stop or pause?: 1  Stop Time 1: 256  Restart Time 1: 328  Pause Time 1: 72 seconds  Total Time Walked (Calculated): 288 seconds  Final Partial Lap Distance (feet): 0 feet  Total Distance Meters (Calculated): 243.84 meters  Predicted Distance Meters (Calculated): 367.8 meters  Percentage of Predicted (Calculated): 66.3  Peak VO2 (Calculated): 11.3  Mets: 3.23  Has The Patient Had a Previous Six Minute Walk Test?: No     Previous 6MWT Results  Has The Patient Had a Previous " Six Minute Walk Test?: No             CT reviewed  PET:    Details    Reading Physician Reading Date Result Priority   Errol Bland MD  [phone] 2/22/2023 Routine     Narrative & Impression  EXAMINATION:  NM PET CT ROUTINE     CLINICAL HISTORY:  Lung nodule, > 8mm; Other nonspecific abnormal finding of lung field     TECHNIQUE:  11.61 mCi of F18-FDG was administered intravenously in the right antecubital fossa.  After an approximately 60 min distribution time, PET/CT images were acquired from the skull base to the mid thigh. Transmission images were acquired to correct for attenuation using a whole body low-dose CT scan without contrast with the arms positioned above the head.     COMPARISON:  Chest CT, 02/08/2023     FINDINGS:  Quality of the study: Adequate.     SUV max of the liver parenchyma is 3.3.     Neck: Multiple lower bilateral jugular chain lymph nodes with marked FDG avidity with SUV max 16.  The largest lymph node is a left supraclavicular lymph node measuring 1.8 x 1.5 cm.     Chest: Bulky mediastinal lymphadenopathy in the superior mediastinum and paratracheal region and subcarinal region with SUV max 23.  The subcarinal lymph node measures 3.2 x 2.4 cm.     Large left hilar mass which extends into the left upper lobe with marked FDG avidity with SUV max 26.     Large right hilar mass/consolidation which extends the right middle lobe with SUV max 16.     Abdomen/pelvis: Small epigastric lymph node with moderate FDG avidity with SUV max 6.1.  Small paola hepatis lymph node with SUV max 10.0.     Left pelvic sidewall small lymph node with SUV max 11.  Left common femoral chain lymph node with SUV max 12.     There are couple of small lymph nodes anterior to the left sartorius muscle which show marked FDG avidity with SUV max 7.3.     Osseous structures: No focal abnormal FDG avidity in the skeleton.     Physiologic uptake of the tracer is present within the brain, salivary glands, myocardium, GI  and  tracts.     Incidental CT findings: N/A     Impression:     1. Extensive metastatic disease.  2. Large consolidation/masses in the lungs bilaterally with marked FDG avidity.  3. Metastatic markedly FDG avid hilar and mediastinal and cervical chain/supraclavicular lymphadenopathy.  4. Metastatic markedly FDG avid lymphadenopathy below the diaphragm in the epigastric region and paola hepatis and left pelvic sidewall and left common femoral chain.  Also small markedly FDG avid nodule/lymph nodes immediately anterior to the left thigh sartorius muscle.  All CT scans at this facility are performed  using dose modulation techniques as appropriate to performed exam including the following:  automated exposure control; adjustment of mA and/or kV according to the patients size (this includes techniques or standardized protocols for targeted exams where dose is matched to indication/reason for exam: i.e. extremities or head);  iterative reconstruction technique.        Electronically signed by: Errol Bland MD  Date:                                            02/22/2023  Time:                                           15:58           Exam Ended: 02/22/23 15:08 Last Resulted: 02/22/23 15:58       Order Details     View Encounter     Lab and Collection Details     Routing     Result History     View Encounter Conversation           Result Care Coordination      Patient Communication     Add Comments   Seen Back to Top             NM PET CT Routine Skull to Mid Thigh: Patient Communication     Add Comments   Seen     External Result Report    External Result Report     Narrative & Impression    EXAMINATION:  NM PET CT ROUTINE     CLINICAL HISTORY:  Lung nodule, > 8mm; Other nonspecific abnormal finding of lung field     TECHNIQUE:  11.61 mCi of F18-FDG was administered intravenously in the right antecubital fossa.  After an approximately 60 min distribution time, PET/CT images were acquired from the skull base to the mid  thigh. Transmission images were acquired to correct for attenuation using a whole body low-dose CT scan without contrast with the arms positioned above the head.     COMPARISON:  Chest CT, 02/08/2023     FINDINGS:  Quality of the study: Adequate.     SUV max of the liver parenchyma is 3.3.     Neck: Multiple lower bilateral jugular chain lymph nodes with marked FDG avidity with SUV max 16.  The largest lymph node is a left supraclavicular lymph node measuring 1.8 x 1.5 cm.     Chest: Bulky mediastinal lymphadenopathy in the superior mediastinum and paratracheal region and subcarinal region with SUV max 23.  The subcarinal lymph node measures 3.2 x 2.4 cm.     Large left hilar mass which extends into the left upper lobe with marked FDG avidity with SUV max 26.     Large right hilar mass/consolidation which extends the right middle lobe with SUV max 16.     Abdomen/pelvis: Small epigastric lymph node with moderate FDG avidity with SUV max 6.1.  Small paola hepatis lymph node with SUV max 10.0.     Left pelvic sidewall small lymph node with SUV max 11.  Left common femoral chain lymph node with SUV max 12.     There are couple of small lymph nodes anterior to the left sartorius muscle which show marked FDG avidity with SUV max 7.3.     Osseous structures: No focal abnormal FDG avidity in the skeleton.     Physiologic uptake of the tracer is present within the brain, salivary glands, myocardium, GI and  tracts.     Incidental CT findings: N/A     Impression:     1. Extensive metastatic disease.  2. Large consolidation/masses in the lungs bilaterally with marked FDG avidity.  3. Metastatic markedly FDG avid hilar and mediastinal and cervical chain/supraclavicular lymphadenopathy.  4. Metastatic markedly FDG avid lymphadenopathy below the diaphragm in the epigastric region and paola hepatis and left pelvic sidewall and left common femoral chain.  Also small markedly FDG avid nodule/lymph nodes immediately anterior to  the left thigh sartorius muscle.  All CT scans at this facility are performed  using dose modulation techniques as appropriate to performed exam including the following:  automated exposure control; adjustment of mA and/or kV according to the patients size (this includes techniques or standardized protocols for targeted exams where dose is matched to indication/reason for exam: i.e. extremities or head);  iterative reconstruction technique.        Electronically signed by: Errol Bland MD  Date:                                            02/22/2023  Time:                                           15:58          Pulmonary Studies Review 3/1/2023   SpO2 -   Ordering Provider Dr. Baca   Interpreting Provider Dr. Baca   Performing nurse/tech/RT C. Credeur, RRT   Diagnosis (No Data)   Height 63   Weight 3220.48   BMI (Calculated) 35.7   Predicted Distance 223.81   Patient Race    6MWT Status completed with stops   Patient Reported Dizziness;Dyspnea   Was O2 used? Yes   Delivery Method Cannula;Pull Tank   6MW Distance walked (feet) 800   Distance walked (meters) 243.84   Did patient stop? Yes   How many times? 1   Stop Time 1 256   Restart Time 1 328   Did patient restart? Yes   Type of assistive device(s) used? no assistive devices   Is extra documentation required for this patient? Yes   Oxygen Saturation 93   Supplemental Oxygen Room Air   Heart Rate 96   Blood Pressure 102/51   Kings Dyspnea Rating  very light   Oxygen Saturation 96   Supplemental Oxygen 2 L/M   Heart Rate 101   Blood Pressure 124/54   Kings Dyspnea Rating  somewhat heavy   Recovery Time (seconds) 240   Oxygen Saturation 98   Supplemental Oxygen 2 L/M   Heart Rate 83   Blood Pressure 124/54   Kings Dyspnea Rating  very light   Is procedure ready for interpretation? Yes   Did the patient stop or pause? Yes   How many times did the patient stop or pause? 1   Stop Time 1 256   Restart Time 1 328   Pause Time 1 72   Total Time Walked (Calculated)  288   Total Laps Walked 4   Final Partial Lap Distance (feet) 0   Total Distance Feet (Calculated) 800   Total Distance Meters (Calculated) 243.84   Predicted Distance Meters (Calculated) 367.8   Percentage of Predicted (Calculated) 66.3   Peak VO2 (Calculated) 11.3   Mets 3.23   Has The Patient Had a Previous Six Minute Walk Test? No   Oxygen Qualification? Yes   Oxygen Saturation 93   Supplemental Oxygen Room Air   Heart Rate 96   Blood Pressure 102/51   Kings Dyspnea Rating  very light   Oxygen Saturation 88   Supplemental Oxygen Room Air   Heart Rate 114   Blood Pressure 124/54   Kings Dyspnea Rating  somewhat heavy   Recovery Time (seconds) 240   Oxygen Saturation 98   Supplemental Oxygen 2 L/M   Heart Rate 83   Blood Pressure 124/54   Kings Dyspnea Rating  very light       US FNA Biopsy w/ Imaging 1st Lesion  Narrative: EXAMINATION:  US FINE NEEDLE ASPIRATION BIOPSY, FIRST LESION    CLINICAL HISTORY:  lymph node biopsy;  Localized enlarged lymph nodes    TECHNIQUE:  Real-time ultrasonography was utilized to perform a fine needle aspiration.  Grayscale and color Doppler spot images were obtained.    COMPARISON:  02/22/2023 PET scan    FINDINGS:  The patient was informed of the risks, benefits, and alternatives to the procedure and had  questions answered. The patient demonstrated verbal understanding and signed the informed consent.    The patient was then prepped and draped in a sterile fashion and local anesthesia was achieved via a 1% lidocaine solution.    Using sonographic guidance, a 22-gauge needle was then inserted into the right neck nodule and a sample was returned. Four passes were made.  The patient tolerated the procedure without an immediate complication.  Impression: Ultrasound guided fine-needle aspiration of a right neck nodule    Electronically signed by: Herson Richardson MD  Date:    03/01/2023  Time:    15:41      Office Spirometry Results:     No flowsheet data found.  Pulmonary Studies Review  3/1/2023   SpO2 -   Ordering Provider Dr. Baca   Interpreting Provider Dr. Baca   Performing nurse/tech/RT C. Credeur, RRT   Diagnosis (No Data)   Height 63   Weight 3220.48   BMI (Calculated) 35.7   Predicted Distance 223.81   Patient Race    6MWT Status completed with stops   Patient Reported Dizziness;Dyspnea   Was O2 used? Yes   Delivery Method Cannula;Pull Tank   6MW Distance walked (feet) 800   Distance walked (meters) 243.84   Did patient stop? Yes   How many times? 1   Stop Time 1 256   Restart Time 1 328   Did patient restart? Yes   Type of assistive device(s) used? no assistive devices   Is extra documentation required for this patient? Yes   Oxygen Saturation 93   Supplemental Oxygen Room Air   Heart Rate 96   Blood Pressure 102/51   Kings Dyspnea Rating  very light   Oxygen Saturation 96   Supplemental Oxygen 2 L/M   Heart Rate 101   Blood Pressure 124/54   Kings Dyspnea Rating  somewhat heavy   Recovery Time (seconds) 240   Oxygen Saturation 98   Supplemental Oxygen 2 L/M   Heart Rate 83   Blood Pressure 124/54   Kings Dyspnea Rating  very light   Is procedure ready for interpretation? Yes   Did the patient stop or pause? Yes   How many times did the patient stop or pause? 1   Stop Time 1 256   Restart Time 1 328   Pause Time 1 72   Total Time Walked (Calculated) 288   Total Laps Walked 4   Final Partial Lap Distance (feet) 0   Total Distance Feet (Calculated) 800   Total Distance Meters (Calculated) 243.84   Predicted Distance Meters (Calculated) 367.8   Percentage of Predicted (Calculated) 66.3   Peak VO2 (Calculated) 11.3   Mets 3.23   Has The Patient Had a Previous Six Minute Walk Test? No   Oxygen Qualification? Yes   Oxygen Saturation 93   Supplemental Oxygen Room Air   Heart Rate 96   Blood Pressure 102/51   Kings Dyspnea Rating  very light   Oxygen Saturation 88   Supplemental Oxygen Room Air   Heart Rate 114   Blood Pressure 124/54   Kings Dyspnea Rating  somewhat heavy   Recovery Time  (seconds) 240   Oxygen Saturation 98   Supplemental Oxygen 2 L/M   Heart Rate 83   Blood Pressure 124/54   Kings Dyspnea Rating  very light         Assessment:                Exercise hypoxemia  -     OXYGEN FOR HOME USE    Malignant neoplasm metastatic to other site  -     OXYGEN FOR HOME USE        Outpatient Encounter Medications as of 3/2/2023   Medication Sig Dispense Refill    albuterol (PROVENTIL HFA) 90 mcg/actuation inhaler Inhale 2 puffs into the lungs every 4 (four) hours as needed for Wheezing. Rescue 18 g 0    albuterol (PROVENTIL) 2.5 mg /3 mL (0.083 %) nebulizer solution Take 3 mLs (2.5 mg total) by nebulization every 4 to 6 hours as needed for Wheezing or Shortness of Breath. 360 mL 11    amoxicillin-clavulanate 875-125mg (AUGMENTIN) 875-125 mg per tablet Take 1 tablet by mouth every 12 (twelve) hours. 20 tablet 0    atorvastatin (LIPITOR) 20 MG tablet Take 20 mg by mouth.      azelastine (ASTELIN) 137 mcg (0.1 %) nasal spray 1 spray by Nasal route 2 (two) times daily.      FLUoxetine 20 MG capsule Take 1 capsule (20 mg total) by mouth once daily. 30 capsule 11    fluticasone propionate (FLONASE) 50 mcg/actuation nasal spray 2 sprays (100 mcg total) by Each Nostril route once daily. 16 g 4    ipratropium (ATROVENT) 0.02 % nebulizer solution Take 2.5 mLs (500 mcg total) by nebulization 4 (four) times daily. Rescue 300 mL 11    iron polysacch complex-b12-fa 150-25-1 mg-mcg-mg (FERREX 150 FORTE) 150-25-1 mg-mcg-mg Cap Take 1 capsule by mouth once daily. 30 capsule 11    loratadine (CLARITIN) 10 mg tablet Take 1 tablet (10 mg total) by mouth once daily. 30 tablet 11    ondansetron (ZOFRAN-ODT) 8 MG TbDL Take 8 mg by mouth.      pantoprazole (PROTONIX) 20 MG tablet Take 1 tablet (20 mg total) by mouth once daily. 90 tablet 3    potassium gluconate 595 (99) mg Tab Take 1 tablet by mouth Daily.      promethazine-codeine 6.25-10 mg/5 ml (PHENERGAN WITH CODEINE) 6.25-10 mg/5 mL syrup Take 5 mLs by mouth every  8 (eight) hours as needed for Cough. 240 mL 0     No facility-administered encounter medications on file as of 3/2/2023.     Plan:       Requested Prescriptions      No prescriptions requested or ordered in this encounter     Problem List Items Addressed This Visit       Neoplasm, metastatic    Relevant Orders    OXYGEN FOR HOME USE     Other Visit Diagnoses       Exercise hypoxemia    -  Primary    Relevant Orders    OXYGEN FOR HOME USE             No follow-ups on file.    MEDICAL DECISION MAKING: Moderate to high complexity.  Overall, the multiple problems listed are of moderate to high severity that may impact quality of life and activities of daily living. Side effects of medications, treatment plan as well as options and alternatives reviewed and discussed with patient. There was counseling of patient concerning these issues.    Total time spent in counseling and coordination of care - 60  minutes of total time spent on the encounter, which includes face to face time and non-face to face time preparing to see the patient (eg, review of tests), Obtaining and/or reviewing separately obtained history, Documenting clinical information in the electronic or other health record, Independently interpreting results (not separately reported) and communicating results to the patient/family/caregiver, or Care coordination (not separately reported).    Time was used in discussion of prognosis, risks, benefits of treatment, instructions and compliance with regimen . Discussion with other physicians and/or health care providers - home health or for use of durable medical equipment (oxygen, nebulizers, CPAP, BiPAP) occurred.

## 2023-03-03 LAB
FLOW CYTOMETRY ANTIBODIES ANALYZED - LYMPH NODE: NORMAL
FLOW CYTOMETRY COMMENT - LYMPH NODE: NORMAL
FLOW CYTOMETRY INTERPRETATION - LYMPH NODE: NORMAL

## 2023-03-04 ENCOUNTER — PATIENT MESSAGE (OUTPATIENT)
Dept: PULMONOLOGY | Facility: CLINIC | Age: 73
End: 2023-03-04
Payer: MEDICARE

## 2023-03-07 ENCOUNTER — PATIENT MESSAGE (OUTPATIENT)
Dept: PULMONOLOGY | Facility: CLINIC | Age: 73
End: 2023-03-07
Payer: MEDICARE

## 2023-03-07 DIAGNOSIS — R94.2 ABNORMAL PET SCAN OF LUNG: Primary | ICD-10-CM

## 2023-03-07 LAB
COMMENT: NORMAL
FINAL PATHOLOGIC DIAGNOSIS: NORMAL
Lab: NORMAL

## 2023-03-16 ENCOUNTER — ANESTHESIA EVENT (OUTPATIENT)
Dept: ENDOSCOPY | Facility: HOSPITAL | Age: 73
End: 2023-03-16
Payer: MEDICARE

## 2023-03-16 ENCOUNTER — TELEPHONE (OUTPATIENT)
Dept: HEMATOLOGY/ONCOLOGY | Facility: CLINIC | Age: 73
End: 2023-03-16
Payer: MEDICARE

## 2023-03-16 ENCOUNTER — HOSPITAL ENCOUNTER (OUTPATIENT)
Facility: HOSPITAL | Age: 73
Discharge: HOME | End: 2023-03-16
Attending: INTERNAL MEDICINE | Admitting: INTERNAL MEDICINE
Payer: MEDICARE

## 2023-03-16 ENCOUNTER — ANESTHESIA (OUTPATIENT)
Dept: ENDOSCOPY | Facility: HOSPITAL | Age: 73
End: 2023-03-16
Payer: MEDICARE

## 2023-03-16 DIAGNOSIS — R94.2 ABNORMAL PET SCAN OF LUNG: ICD-10-CM

## 2023-03-16 PROCEDURE — 87070 CULTURE OTHR SPECIMN AEROBIC: CPT | Performed by: INTERNAL MEDICINE

## 2023-03-16 PROCEDURE — 88342 CHG IMMUNOCYTOCHEMISTRY: ICD-10-PCS | Mod: 26,,, | Performed by: PATHOLOGY

## 2023-03-16 PROCEDURE — 27200944 HC BRONCH FORCEPS DISPOSABLE: Performed by: INTERNAL MEDICINE

## 2023-03-16 PROCEDURE — 88312 PR  SPECIAL STAINS,GROUP I: ICD-10-PCS | Mod: 26,,, | Performed by: PATHOLOGY

## 2023-03-16 PROCEDURE — 88364 INSITU HYBRIDIZATION (FISH): CPT | Performed by: PATHOLOGY

## 2023-03-16 PROCEDURE — 31623 PR BRONCHOSCOPY,DIAGNOSTIC W BRUSH: ICD-10-PCS | Mod: 51,LT,, | Performed by: INTERNAL MEDICINE

## 2023-03-16 PROCEDURE — 88189 FLOWCYTOMETRY/READ 16 & >: CPT | Mod: 59,,, | Performed by: PATHOLOGY

## 2023-03-16 PROCEDURE — 88189 PR  FLOWCYTOMETRY/READ, 16 & > MARKERS: ICD-10-PCS | Mod: 59,,, | Performed by: PATHOLOGY

## 2023-03-16 PROCEDURE — 81264 IGK REARRANGEABN CLONAL POP: CPT | Performed by: PATHOLOGY

## 2023-03-16 PROCEDURE — 31653 BRONCH EBUS SAMPLNG 3/> NODE: CPT | Mod: ,,, | Performed by: INTERNAL MEDICINE

## 2023-03-16 PROCEDURE — 88172 CYTP DX EVAL FNA 1ST EA SITE: CPT | Mod: 26,,, | Performed by: PATHOLOGY

## 2023-03-16 PROCEDURE — 88112 CYTOPATH CELL ENHANCE TECH: CPT | Mod: 59 | Performed by: PATHOLOGY

## 2023-03-16 PROCEDURE — 88341 IMHCHEM/IMCYTCHM EA ADD ANTB: CPT | Mod: 26,,, | Performed by: PATHOLOGY

## 2023-03-16 PROCEDURE — 88172 CYTP DX EVAL FNA 1ST EA SITE: CPT | Mod: 59 | Performed by: PATHOLOGY

## 2023-03-16 PROCEDURE — 63600175 PHARM REV CODE 636 W HCPCS: Performed by: NURSE ANESTHETIST, CERTIFIED REGISTERED

## 2023-03-16 PROCEDURE — 31625 BRONCHOSCOPY W/BIOPSY(S): CPT | Mod: 59,LT,, | Performed by: INTERNAL MEDICINE

## 2023-03-16 PROCEDURE — 88177 CYTP FNA EVAL EA ADDL: CPT | Mod: 26,,, | Performed by: PATHOLOGY

## 2023-03-16 PROCEDURE — 31624 DX BRONCHOSCOPE/LAVAGE: CPT | Mod: 51,LT,, | Performed by: INTERNAL MEDICINE

## 2023-03-16 PROCEDURE — 31623 DX BRONCHOSCOPE/BRUSH: CPT | Mod: 51,LT,, | Performed by: INTERNAL MEDICINE

## 2023-03-16 PROCEDURE — 37000009 HC ANESTHESIA EA ADD 15 MINS: Performed by: INTERNAL MEDICINE

## 2023-03-16 PROCEDURE — 88173 CYTOPATH EVAL FNA REPORT: CPT | Mod: 26,,, | Performed by: PATHOLOGY

## 2023-03-16 PROCEDURE — 81261 IGH GENE REARRANGE AMP METH: CPT | Performed by: PATHOLOGY

## 2023-03-16 PROCEDURE — 88173 PR  INTERPRETATION OF FNA SMEAR: ICD-10-PCS | Mod: 26,,, | Performed by: PATHOLOGY

## 2023-03-16 PROCEDURE — 31625 PR BRONCHOSCOPY,BIOPSY: ICD-10-PCS | Mod: 59,LT,, | Performed by: INTERNAL MEDICINE

## 2023-03-16 PROCEDURE — 31623 DX BRONCHOSCOPE/BRUSH: CPT | Performed by: INTERNAL MEDICINE

## 2023-03-16 PROCEDURE — 31624 PR BRONCHOSCOPY,DIAG2STIC W LAVAGE: ICD-10-PCS | Mod: 51,LT,, | Performed by: INTERNAL MEDICINE

## 2023-03-16 PROCEDURE — 88342 IMHCHEM/IMCYTCHM 1ST ANTB: CPT | Performed by: PATHOLOGY

## 2023-03-16 PROCEDURE — 87102 FUNGUS ISOLATION CULTURE: CPT | Performed by: INTERNAL MEDICINE

## 2023-03-16 PROCEDURE — 27202059 HC NEEDLE, FNA (ANY): Performed by: INTERNAL MEDICINE

## 2023-03-16 PROCEDURE — 31653 PR BRONCH W/ EBUS, SAMPLING 3 OR MORE NODES, INCL GUIDE: ICD-10-PCS | Mod: ,,, | Performed by: INTERNAL MEDICINE

## 2023-03-16 PROCEDURE — 88342 IMHCHEM/IMCYTCHM 1ST ANTB: CPT | Mod: 26,,, | Performed by: PATHOLOGY

## 2023-03-16 PROCEDURE — 27200946 HC BRUSH, CYTOLOGY: Performed by: INTERNAL MEDICINE

## 2023-03-16 PROCEDURE — 88172 PR  EVALUATION OF FNA SMEAR TO DETERMINE ADEQUACY, FIRST EVAL: ICD-10-PCS | Mod: 26,,, | Performed by: PATHOLOGY

## 2023-03-16 PROCEDURE — 87116 MYCOBACTERIA CULTURE: CPT | Performed by: INTERNAL MEDICINE

## 2023-03-16 PROCEDURE — 88184 FLOWCYTOMETRY/ TC 1 MARKER: CPT | Mod: 59 | Performed by: PATHOLOGY

## 2023-03-16 PROCEDURE — 88185 FLOWCYTOMETRY/TC ADD-ON: CPT | Mod: 59 | Performed by: PATHOLOGY

## 2023-03-16 PROCEDURE — 88341 IMHCHEM/IMCYTCHM EA ADD ANTB: CPT | Performed by: PATHOLOGY

## 2023-03-16 PROCEDURE — 87210 SMEAR WET MOUNT SALINE/INK: CPT | Performed by: INTERNAL MEDICINE

## 2023-03-16 PROCEDURE — 31624 DX BRONCHOSCOPE/LAVAGE: CPT

## 2023-03-16 PROCEDURE — 25000003 PHARM REV CODE 250: Performed by: INTERNAL MEDICINE

## 2023-03-16 PROCEDURE — 88177 PR  EVALUATION OF FNA SMEAR TO DETERMINE ADEQUACY, EA ADD EVAL: ICD-10-PCS | Mod: 26,,, | Performed by: PATHOLOGY

## 2023-03-16 PROCEDURE — 87015 SPECIMEN INFECT AGNT CONCNTJ: CPT | Performed by: INTERNAL MEDICINE

## 2023-03-16 PROCEDURE — 88177 CYTP FNA EVAL EA ADDL: CPT | Mod: 59 | Performed by: PATHOLOGY

## 2023-03-16 PROCEDURE — 88305 TISSUE EXAM BY PATHOLOGIST: CPT | Performed by: PATHOLOGY

## 2023-03-16 PROCEDURE — 25000003 PHARM REV CODE 250: Performed by: NURSE ANESTHETIST, CERTIFIED REGISTERED

## 2023-03-16 PROCEDURE — 37000008 HC ANESTHESIA 1ST 15 MINUTES: Performed by: INTERNAL MEDICINE

## 2023-03-16 PROCEDURE — 88341 PR IHC OR ICC EACH ADD'L SINGLE ANTIBODY  STAINPR: ICD-10-PCS | Mod: 26,,, | Performed by: PATHOLOGY

## 2023-03-16 PROCEDURE — 31653 BRONCH EBUS SAMPLNG 3/> NODE: CPT | Performed by: INTERNAL MEDICINE

## 2023-03-16 PROCEDURE — 25000242 PHARM REV CODE 250 ALT 637 W/ HCPCS: Performed by: INTERNAL MEDICINE

## 2023-03-16 PROCEDURE — 88312 SPECIAL STAINS GROUP 1: CPT | Mod: 26,,, | Performed by: PATHOLOGY

## 2023-03-16 PROCEDURE — 88305 TISSUE EXAM BY PATHOLOGIST: CPT | Mod: 26,,, | Performed by: PATHOLOGY

## 2023-03-16 PROCEDURE — 88112 CYTOPATH CELL ENHANCE TECH: CPT | Mod: 26,59,, | Performed by: PATHOLOGY

## 2023-03-16 PROCEDURE — 87205 SMEAR GRAM STAIN: CPT | Performed by: INTERNAL MEDICINE

## 2023-03-16 PROCEDURE — 88305 TISSUE EXAM BY PATHOLOGIST: ICD-10-PCS | Mod: 26,,, | Performed by: PATHOLOGY

## 2023-03-16 PROCEDURE — 88112 PR  CYTOPATH, CELL ENHANCE TECH: ICD-10-PCS | Mod: 26,59,, | Performed by: PATHOLOGY

## 2023-03-16 PROCEDURE — 88312 SPECIAL STAINS GROUP 1: CPT | Mod: 59 | Performed by: PATHOLOGY

## 2023-03-16 PROCEDURE — 88173 CYTOPATH EVAL FNA REPORT: CPT | Performed by: PATHOLOGY

## 2023-03-16 PROCEDURE — 31625 BRONCHOSCOPY W/BIOPSY(S): CPT | Performed by: INTERNAL MEDICINE

## 2023-03-16 PROCEDURE — 88365 INSITU HYBRIDIZATION (FISH): CPT | Performed by: PATHOLOGY

## 2023-03-16 PROCEDURE — 87206 SMEAR FLUORESCENT/ACID STAI: CPT | Performed by: INTERNAL MEDICINE

## 2023-03-16 RX ORDER — SODIUM CHLORIDE, SODIUM LACTATE, POTASSIUM CHLORIDE, CALCIUM CHLORIDE 600; 310; 30; 20 MG/100ML; MG/100ML; MG/100ML; MG/100ML
INJECTION, SOLUTION INTRAVENOUS CONTINUOUS PRN
Status: DISCONTINUED | OUTPATIENT
Start: 2023-03-16 | End: 2023-03-16

## 2023-03-16 RX ORDER — LIDOCAINE HCL/PF 100 MG/5ML
SYRINGE (ML) INTRAVENOUS
Status: DISCONTINUED | OUTPATIENT
Start: 2023-03-16 | End: 2023-03-16

## 2023-03-16 RX ORDER — IPRATROPIUM BROMIDE AND ALBUTEROL SULFATE 2.5; .5 MG/3ML; MG/3ML
3 SOLUTION RESPIRATORY (INHALATION) ONCE
Status: DISCONTINUED | OUTPATIENT
Start: 2023-03-16 | End: 2023-03-16

## 2023-03-16 RX ORDER — LIDOCAINE HYDROCHLORIDE 10 MG/ML
INJECTION INFILTRATION; PERINEURAL
Status: COMPLETED | OUTPATIENT
Start: 2023-03-16 | End: 2023-03-16

## 2023-03-16 RX ORDER — IPRATROPIUM BROMIDE AND ALBUTEROL SULFATE 2.5; .5 MG/3ML; MG/3ML
3 SOLUTION RESPIRATORY (INHALATION) ONCE
Status: COMPLETED | OUTPATIENT
Start: 2023-03-16 | End: 2023-03-16

## 2023-03-16 RX ORDER — PROPOFOL 10 MG/ML
VIAL (ML) INTRAVENOUS
Status: DISCONTINUED | OUTPATIENT
Start: 2023-03-16 | End: 2023-03-16

## 2023-03-16 RX ORDER — ONDANSETRON 2 MG/ML
INJECTION INTRAMUSCULAR; INTRAVENOUS
Status: DISCONTINUED | OUTPATIENT
Start: 2023-03-16 | End: 2023-03-16

## 2023-03-16 RX ORDER — ROCURONIUM BROMIDE 10 MG/ML
INJECTION, SOLUTION INTRAVENOUS
Status: DISCONTINUED | OUTPATIENT
Start: 2023-03-16 | End: 2023-03-16

## 2023-03-16 RX ADMIN — SUGAMMADEX 200 MG: 100 INJECTION, SOLUTION INTRAVENOUS at 01:03

## 2023-03-16 RX ADMIN — SODIUM CHLORIDE, SODIUM LACTATE, POTASSIUM CHLORIDE, AND CALCIUM CHLORIDE: .6; .31; .03; .02 INJECTION, SOLUTION INTRAVENOUS at 12:03

## 2023-03-16 RX ADMIN — ONDANSETRON 4 MG: 2 INJECTION INTRAMUSCULAR; INTRAVENOUS at 12:03

## 2023-03-16 RX ADMIN — IPRATROPIUM BROMIDE AND ALBUTEROL SULFATE 3 ML: 2.5; .5 SOLUTION RESPIRATORY (INHALATION) at 02:03

## 2023-03-16 RX ADMIN — LIDOCAINE HYDROCHLORIDE 50 MG: 20 INJECTION, SOLUTION INTRAVENOUS at 12:03

## 2023-03-16 RX ADMIN — ROCURONIUM BROMIDE 50 MG: 10 INJECTION, SOLUTION INTRAVENOUS at 12:03

## 2023-03-16 RX ADMIN — PROPOFOL 150 MG: 10 INJECTION, EMULSION INTRAVENOUS at 12:03

## 2023-03-16 NOTE — DISCHARGE SUMMARY
[facility] ([facility])  Pulmonology  Discharge Summary      Patient Name: Dory Xie  MRN: [id number]  Admission Date: 3/16/2023  Hospital Length of Stay: 0 days  Discharge Date and Time:  03/16/2023 2:18 PM  Attending Physician: Agatha Rhoades MD   Discharging Provider: Agatha Rhoades MD  Primary Care Provider: Makenna Davey MD    HPI: abnormal PET scan     Procedure(s) (LRB):  ENDOBRONCHIAL ULTRASOUND (EBUS) (Bilateral)  BRONCHOSCOPY (N/A)    Indwelling Lines/Drains at Time of Discharge:   Lines/Drains/Airways       None                   Hospital Course: Bronch w bAL , forceps biopsy brush of DALTON   EBUS of station 7 , 10 L , 11 R            Pending Diagnostic Studies:       Procedure Component Value Units Date/Time    Cytology- FNA Radiology Guided, Bronch/EBUS, EUS/GI [[id number]] Collected: 03/16/23 1400    Order Status: Sent Lab Status: In process Updated: 03/16/23 1400    Flow Cytometry Analysis (Body Fluid) Other (Specify) (station 10L lymph node) [[id number]] Collected: 03/16/23 1343    Order Status: Sent Lab Status: In process Updated: 03/16/23 1344    Specimen: Body Fluid     Flow Cytometry Analysis (Body Fluid) Other (Specify) (station 7 lymph node) [[id number]] Collected: 03/16/23 1343    Order Status: Sent Lab Status: In process Updated: 03/16/23 1344    Specimen: Body Fluid           Final Active Diagnoses:    Diagnosis Date Noted POA    Abnormal PET scan of lung [R94.2] 03/16/2023 Unknown      Problems Resolved During this Admission:       Discharged Condition: good    Disposition: Home or Self Care    Follow Up:   Follow-up Information       Wyatt Baca MD Follow up in 2 week(s).    Specialty: Pulmonary Disease  Contact information:  [address] [place] [address] [phone]                           Patient Instructions:   For low grade fever take 650 mg tylenol     For blood in sputum it should resolve in 48 hours     For severe shortness of breath or massive  blood in phlegm report to ED   Medications:  Reconciled Home Medications:      Medication List        CONTINUE taking these medications      atorvastatin 20 MG tablet  Commonly known as: LIPITOR  Take 20 mg by mouth.     azelastine 137 mcg (0.1 %) nasal spray  Commonly known as: ASTELIN  1 spray by Nasal route 2 (two) times daily.     FERREX 150 FORTE 150-25-1 mg-mcg-mg Cap  Generic drug: iron polysacch complex-b12-fa 150-25-1 mg-mcg-mg  Take 1 capsule by mouth once daily.     FLUoxetine 20 MG capsule  Take 1 capsule (20 mg total) by mouth once daily.     fluticasone propionate 50 mcg/actuation nasal spray  Commonly known as: FLONASE  2 sprays (100 mcg total) by Each Nostril route once daily.     ipratropium 0.02 % nebulizer solution  Commonly known as: ATROVENT  Take 2.5 mLs (500 mcg total) by nebulization 4 (four) times daily. Rescue     loratadine 10 mg tablet  Commonly known as: CLARITIN  Take 1 tablet (10 mg total) by mouth once daily.     ondansetron 8 MG Tbdl  Commonly known as: ZOFRAN-ODT  Take 8 mg by mouth.     pantoprazole 20 MG tablet  Commonly known as: PROTONIX  Take 1 tablet (20 mg total) by mouth once daily.            STOP taking these medications      amoxicillin-clavulanate 875-125mg 875-125 mg per tablet  Commonly known as: AUGMENTIN     promethazine-codeine 6.25-10 mg/5 ml 6.25-10 mg/5 mL syrup  Commonly known as: PHENERGAN with CODEINE            ASK your doctor about these medications      * albuterol 2.5 mg /3 mL (0.083 %) nebulizer solution  Commonly known as: PROVENTIL  Take 3 mLs (2.5 mg total) by nebulization every 4 to 6 hours as needed for Wheezing or Shortness of Breath.     * albuterol 90 mcg/actuation inhaler  Commonly known as: PROVENTIL/VENTOLIN HFA  INHALE 2 PUFFS INTO THE LUNGS EVERY 4 (FOUR) HOURS AS NEEDED FOR WHEEZING     potassium gluconate 595 mg (99 mg) Tab  Take 1 tablet by mouth Daily.           * This list has 2 medication(s) that are the same as other medications  prescribed for you. Read the directions carefully, and ask your doctor or other care provider to review them with you.                  Agatha Rhoades MD  Pulmonology  [facility] ([facility])

## 2023-03-16 NOTE — ANESTHESIA PROCEDURE NOTES
Intubation    Date/Time: 3/16/2023 12:12 PM  Performed by: Allan Lentz CRNA  Authorized by: Sorin Rahman II, MD     Intubation:     Induction:  Intravenous    Intubated:  Postinduction    Mask Ventilation:  Easy mask    Attempts:  1    Attempted By:  CRNA    Method of Intubation:  Video laryngoscopy    Blade:  Mandujano 3    Laryngeal View Grade: Grade I - full view of cords      Difficult Airway Encountered?: No      Complications:  None    Airway Device:  Oral endotracheal tube    Airway Device Size:  8.5    Style/Cuff Inflation:  Cuffed    Inflation Amount (mL):  3    Tube secured:  21    Secured at:  The lips    Placement Verified By:  Capnometry    Complicating Factors:  None    Findings Post-Intubation:  BS equal bilateral and atraumatic/condition of teeth unchanged

## 2023-03-16 NOTE — INTERVAL H&P NOTE
The patient has been examined and the H&P has been reviewed:    I concur with the findings and no changes have occurred since H&P was written.    Procedure risks, benefits and alternative options discussed and understood by patient/family.      ABNORMAL PET

## 2023-03-16 NOTE — TELEPHONE ENCOUNTER
----- Message from Ilana Munoz sent at 3/16/2023  8:45 AM CDT -----  Contact: Dory Bolaños is calling in regards to her appt on 03/16/2023.Please call back at [phone]          Thanks  MATT

## 2023-03-16 NOTE — TRANSFER OF CARE
"Anesthesia Transfer of Care Note    Patient: Dory Xie    Procedure(s) Performed: Procedure(s) (LRB):  ENDOBRONCHIAL ULTRASOUND (EBUS) (Bilateral)  BRONCHOSCOPY (N/A)    Patient location: GI    Anesthesia Type: general    Transport from OR: Transported from OR on room air with adequate spontaneous ventilation    Post pain: adequate analgesia    Post assessment: no apparent anesthetic complications and tolerated procedure well    Post vital signs: stable    Level of consciousness: awake, alert and oriented    Nausea/Vomiting: no nausea/vomiting    Complications: none    Transfer of care protocol was followed      Last vitals:   Visit Vitals  /68 (BP Location: Left arm, Patient Position: Lying)   Pulse 91   Temp 37.1 °C (98.8 °F) (Temporal)   Resp 16   Ht 5' 3" (1.6 m)   Wt 90.7 kg (200 lb)   SpO2 (!) 93%   Breastfeeding No   BMI 35.43 kg/m²     "

## 2023-03-16 NOTE — ANESTHESIA PREPROCEDURE EVALUATION
03/16/2023  Dory Xie is a 73 y.o., female.      Pre-op Assessment    I have reviewed the Patient Summary Reports.     I have reviewed the Nursing Notes. I have reviewed the NPO Status.   I have reviewed the Medications.     Review of Systems  Anesthesia Hx:  Denies Family Hx of Anesthesia complications.   Denies Personal Hx of Anesthesia complications.   Cardiovascular:   Exercise tolerance: poor hyperlipidemia Normal EF   Pulmonary:   Shortness of breath Mass of upper lobe   Musculoskeletal:   Arthritis     Psych:   depression          Physical Exam  General: Well nourished, Cooperative, Alert and Oriented    Airway:  Mallampati: II   Mouth Opening: Normal  TM Distance: Normal  Tongue: Normal  Neck ROM: Normal ROM    Dental:  Intact        Anesthesia Plan  Type of Anesthesia, risks & benefits discussed:    Anesthesia Type: Gen ETT  Intra-op Monitoring Plan: Standard ASA Monitors  Post Op Pain Control Plan: IV/PO Opioids PRN  Induction:  IV  Informed Consent: Informed consent signed with the Patient and all parties understand the risks and agree with anesthesia plan.  All questions answered.   ASA Score: 3    Ready For Surgery From Anesthesia Perspective.     .

## 2023-03-16 NOTE — PLAN OF CARE
Dr Rhoades at  to discuss findings. VSS. No  Pain, no bleeding. Pt to be discharged from unit pending wean from supplemental O2.

## 2023-03-16 NOTE — ANESTHESIA POSTPROCEDURE EVALUATION
Anesthesia Post Evaluation    Patient: Dory Xie    Procedure(s) Performed: Procedure(s) (LRB):  ENDOBRONCHIAL ULTRASOUND (EBUS) (Bilateral)  BRONCHOSCOPY (N/A)    Final Anesthesia Type: general      Patient location during evaluation: PACU  Patient participation: Yes- Able to Participate  Level of consciousness: awake and alert  Post-procedure vital signs: reviewed and stable  Pain management: adequate  Airway patency: patent  AMINTA mitigation strategies: Multimodal analgesia  PONV status at discharge: No PONV  Anesthetic complications: no      Cardiovascular status: blood pressure returned to baseline  Respiratory status: unassisted and spontaneous ventilation  Hydration status: euvolemic  Follow-up not needed.          Vitals Value Taken Time   BP 86/43 03/16/23 1523   Temp 37.1 °C (98.8 °F) 03/16/23 1333   Pulse 86 03/16/23 1523   Resp 18 03/16/23 1523   SpO2 99 % 03/16/23 1523         Event Time   Out of Recovery 15:32:36         Pain/Alka Score: Alka Score: 10 (3/16/2023  3:23 PM)

## 2023-03-17 VITALS
SYSTOLIC BLOOD PRESSURE: 86 MMHG | OXYGEN SATURATION: 99 % | DIASTOLIC BLOOD PRESSURE: 43 MMHG | TEMPERATURE: 99 F | HEART RATE: 86 BPM | HEIGHT: 63 IN | RESPIRATION RATE: 18 BRPM | WEIGHT: 200 LBS | BODY MASS INDEX: 35.44 KG/M2

## 2023-03-17 LAB — KOH PREP SPEC: NORMAL

## 2023-03-19 ENCOUNTER — PATIENT MESSAGE (OUTPATIENT)
Dept: PULMONOLOGY | Facility: CLINIC | Age: 73
End: 2023-03-19
Payer: MEDICARE

## 2023-03-20 LAB
BACTERIA SPEC AEROBE CULT: NO GROWTH
FLOW CYTOMETRY ANTIBODIES ANALYZED - TISSUE: NORMAL
FLOW CYTOMETRY ANTIBODIES ANALYZED - TISSUE: NORMAL
FLOW CYTOMETRY COMMENT - TISSUE: NORMAL
FLOW CYTOMETRY COMMENT - TISSUE: NORMAL
FLOW CYTOMETRY INTERPRETATION - TISSUE: NORMAL
FLOW CYTOMETRY INTERPRETATION - TISSUE: NORMAL
GRAM STN SPEC: NORMAL
GRAM STN SPEC: NORMAL
SPECIMEN TYPE - TISSUE: NORMAL
SPECIMEN TYPE - TISSUE: NORMAL

## 2023-03-24 ENCOUNTER — TELEPHONE (OUTPATIENT)
Dept: PULMONOLOGY | Facility: CLINIC | Age: 73
End: 2023-03-24

## 2023-03-24 ENCOUNTER — OFFICE VISIT (OUTPATIENT)
Dept: PULMONOLOGY | Facility: CLINIC | Age: 73
End: 2023-03-24
Payer: MEDICARE

## 2023-03-24 ENCOUNTER — PATIENT MESSAGE (OUTPATIENT)
Dept: PULMONOLOGY | Facility: CLINIC | Age: 73
End: 2023-03-24

## 2023-03-24 VITALS — BODY MASS INDEX: 35.44 KG/M2 | HEIGHT: 63 IN | WEIGHT: 200 LBS

## 2023-03-24 DIAGNOSIS — J96.11 CHRONIC RESPIRATORY FAILURE WITH HYPOXIA: ICD-10-CM

## 2023-03-24 DIAGNOSIS — J90 PLEURAL EFFUSION: ICD-10-CM

## 2023-03-24 DIAGNOSIS — R94.2 ABNORMAL PET SCAN OF LUNG: Primary | ICD-10-CM

## 2023-03-24 DIAGNOSIS — R09.02 EXERCISE HYPOXEMIA: ICD-10-CM

## 2023-03-24 PROCEDURE — 3008F BODY MASS INDEX DOCD: CPT | Mod: CPTII,95,, | Performed by: INTERNAL MEDICINE

## 2023-03-24 PROCEDURE — 3288F FALL RISK ASSESSMENT DOCD: CPT | Mod: CPTII,95,, | Performed by: INTERNAL MEDICINE

## 2023-03-24 PROCEDURE — 1126F AMNT PAIN NOTED NONE PRSNT: CPT | Mod: CPTII,95,, | Performed by: INTERNAL MEDICINE

## 2023-03-24 PROCEDURE — 3008F PR BODY MASS INDEX (BMI) DOCUMENTED: ICD-10-PCS | Mod: CPTII,95,, | Performed by: INTERNAL MEDICINE

## 2023-03-24 PROCEDURE — 1159F PR MEDICATION LIST DOCUMENTED IN MEDICAL RECORD: ICD-10-PCS | Mod: CPTII,95,, | Performed by: INTERNAL MEDICINE

## 2023-03-24 PROCEDURE — 1159F MED LIST DOCD IN RCRD: CPT | Mod: CPTII,95,, | Performed by: INTERNAL MEDICINE

## 2023-03-24 PROCEDURE — 99215 OFFICE O/P EST HI 40 MIN: CPT | Mod: 95,,, | Performed by: INTERNAL MEDICINE

## 2023-03-24 PROCEDURE — 1101F PT FALLS ASSESS-DOCD LE1/YR: CPT | Mod: CPTII,95,, | Performed by: INTERNAL MEDICINE

## 2023-03-24 PROCEDURE — 3288F PR FALLS RISK ASSESSMENT DOCUMENTED: ICD-10-PCS | Mod: CPTII,95,, | Performed by: INTERNAL MEDICINE

## 2023-03-24 PROCEDURE — 1126F PR PAIN SEVERITY QUANTIFIED, NO PAIN PRESENT: ICD-10-PCS | Mod: CPTII,95,, | Performed by: INTERNAL MEDICINE

## 2023-03-24 PROCEDURE — 1101F PR PT FALLS ASSESS DOC 0-1 FALLS W/OUT INJ PAST YR: ICD-10-PCS | Mod: CPTII,95,, | Performed by: INTERNAL MEDICINE

## 2023-03-24 PROCEDURE — 99215 PR OFFICE/OUTPT VISIT, EST, LEVL V, 40-54 MIN: ICD-10-PCS | Mod: 95,,, | Performed by: INTERNAL MEDICINE

## 2023-03-24 NOTE — ASSESSMENT & PLAN NOTE
S/p Bronchoscopy with endobronchial ultrasound  Cytology final report - no cancer - lymph node studies for lymphoma are pending    All specimens from bronchoscopy are reported and negative for cancer but additional stains for lymphoma are pending and should be available by 3/29/2023

## 2023-03-24 NOTE — ASSESSMENT & PLAN NOTE
Patient with Hypoxic Respiratory failure which is Chronic.  she is on home oxygen at 2- 3 LPM. Supplemental oxygen was provided and noted-  .   Signs/symptoms of respiratory failure include- increased work of breathing. Contributing diagnoses includes - Pleural effusion Labs and images were reviewed. Patient Has not had a recent ABG. Will treat underlying causes and adjust management of respiratory failure as follows- check for enlargement of pleural effusion

## 2023-03-24 NOTE — H&P (VIEW-ONLY)
"Subjective:      The patient location is:  [id number] [place] [id number]    The chief complaint leading to consultation is: follow up for bronchoscopy  Visit type: Virtual visit with synchronous audio and video     Each patient to whom he or she provides medical services by telemedicine is:  (1) informed of the relationship between the physician and patient and the respective role of any other health care provider with respect to management of the patient; and (2) notified that he or she may decline to receive medical services by telemedicine and may withdraw from such care at any time.    Notes: worsening shortness of breath        Patient ID: Dory Xie is a 73 y.o. female.    Chief Complaint:        HPI   Follow up for bronchoscopy   Bronch w bAL , forceps biopsy brush of DALTON   EBUS of station 7 , 10 L , 11 R    HPI   73 y/owith lung mass and positive PET - worsening shortness of breath since last seen  Underwent CT guided needle BX of lymph nodes and bronchoscopy with endobronchial ultrasound - no specific diagnosis obtained  Had a small pleural effusion on CT will recheck     Oxygen low when at rest on room air 83%  Hx of pleural effusion     NO fever or chills, no sputum production but " congested "    All specimens from bronchoscopy are reported and negative for cancer but additional stains for lymphoma are pending and should be available by 3/29/2023    Past Medical History:   Diagnosis Date    Arthritis     Cancer     cervical    Chronic diastolic (congestive) heart failure 02/10/2023    Chronic pain     From osteoarthritis    Colitis     GERD (gastroesophageal reflux disease)     Seasonal allergies      Past Surgical History:   Procedure Laterality Date    BRONCHOSCOPY N/A 3/16/2023    Procedure: BRONCHOSCOPY;  Surgeon: Agatha Rhoades MD;  Location: [facility];  Service: Pulmonary;  Laterality: N/A;    COLONOSCOPY N/A 09/21/2016    Procedure: COLONOSCOPY;  Surgeon: Hector Summers III, " MD;  Location: [facility] ENDO;  Service: Endoscopy;  Laterality: N/A;    ENDOBRONCHIAL ULTRASOUND Bilateral 3/16/2023    Procedure: ENDOBRONCHIAL ULTRASOUND (EBUS);  Surgeon: Agatha Rhoades MD;  Location: [facility] ENDO;  Service: Pulmonary;  Laterality: Bilateral;    EYE SURGERY      HYSTERECTOMY      29yrs old    JOINT REPLACEMENT  Both knees    KNEE SURGERY Left     KNEE SURGERY Right     TONSILLECTOMY      TOTAL KNEE ARTHROPLASTY Bilateral 2019    TUBAL LIGATION       Review of patient's allergies indicates:  No Known Allergies    Current Outpatient Medications on File Prior to Visit   Medication Sig Dispense Refill    albuterol (PROVENTIL/VENTOLIN HFA) 90 mcg/actuation inhaler INHALE 2 PUFFS INTO THE LUNGS EVERY 4 (FOUR) HOURS AS NEEDED FOR WHEEZING 18 g 2    atorvastatin (LIPITOR) 20 MG tablet Take 20 mg by mouth.      azelastine (ASTELIN) 137 mcg (0.1 %) nasal spray 1 spray by Nasal route 2 (two) times daily.      FLUoxetine 20 MG capsule Take 1 capsule (20 mg total) by mouth once daily. 30 capsule 11    fluticasone propionate (FLONASE) 50 mcg/actuation nasal spray 2 sprays (100 mcg total) by Each Nostril route once daily. 16 g 4    ipratropium (ATROVENT) 0.02 % nebulizer solution Take 2.5 mLs (500 mcg total) by nebulization 4 (four) times daily. Rescue 300 mL 11    iron polysacch complex-b12-fa 150-25-1 mg-mcg-mg (FERREX 150 FORTE) 150-25-1 mg-mcg-mg Cap Take 1 capsule by mouth once daily. 30 capsule 11    loratadine (CLARITIN) 10 mg tablet Take 1 tablet (10 mg total) by mouth once daily. 30 tablet 11    ondansetron (ZOFRAN-ODT) 8 MG TbDL Take 8 mg by mouth.      pantoprazole (PROTONIX) 20 MG tablet Take 1 tablet (20 mg total) by mouth once daily. 90 tablet 3    albuterol (PROVENTIL) 2.5 mg /3 mL (0.083 %) nebulizer solution Take 3 mLs (2.5 mg total) by nebulization every 4 to 6 hours as needed for Wheezing or Shortness of Breath. 360 mL 11    [DISCONTINUED] potassium gluconate 595 (99) mg Tab Take 1 tablet by  "mouth Daily.       No current facility-administered medications on file prior to visit.     Social History     Socioeconomic History    Marital status: Single   Tobacco Use    Smoking status: Never     Passive exposure: Never    Smokeless tobacco: Never   Substance and Sexual Activity    Alcohol use: No    Drug use: No     @FAM@  Review of Systems   Respiratory:  Positive for cough, sputum production, shortness of breath and dyspnea on extertion.      Objective:        Ht 5' 3" (1.6 m)   Wt 90.7 kg (200 lb)   BMI 35.43 kg/m²     The patient has been recording vital signs at home and the following data was reviewed to make a evaluation and management decision:  Review of data:  No flowsheet data found.  No flowsheet data found.  Wt Readings from Last 1 Encounters:   03/24/23 90.7 kg (200 lb)     Ht Readings from Last 1 Encounters:   03/24/23 5' 3" (1.6 m)       Constitutional: Patient is oriented to person, place and time.  He appears well developed and well nourished.  Neurologic: He is alert and orientated.  Psychiatric: He has appropriate mood and affect. His behavior during the interview as normal. Judgement and thought content - normal.      Personal Diagnostic Review  Non diagnostic endobronchial ultrasound - Surgical path pending    [facility] PATHOLOGY & LABORATORY MEDICINE [facility] [place] [address] Ph [phone] Fax [phone] CYTOLOGY REPORT NGUYEN SAVAGE [facility] RADHA MONTGOMERY 1. Left upper lobe, fine needle biopsy (3 smears, 1 cell block): Fragments of reactive bronchial mucosa with submucosal lymphoid proliferation, see comment Negative for evidence of carcinoma ORDERING / ATTENDING PHYSICIAN(S) Gross Description SPECIMEN FINAL PATHOLOGIC DIAGNOSIS Ordering: Attending: CLINICAL INFORMATION Mass of upper lobe of left lung, mediastinal lymphadenopathy SPECIMEN #1(DALTON BX): Received 3 Stained AD slides, 1 formalin " jar with several small cores;th 3 slides, 1 cell block SPECIMEN #2(BB): Received 4 Stained AD slides, 1 brush tip in clear fluid;th 4 slides, 1 ngtp SPECIMEN #3(STA 7): Received 6 Stained AD slides, 1 formalin jar with multiple core specs;th 6 slides, 1 cell block SPECIMEN #4(10L); Received 3 Stained AD slides, 1 formalin jar with multiple core specs;th 3 slides, 1 cell block SPECIMEN #5(11R): Received 2 Stained AD slides, 1 formalin jar with multiple core specs: th 2 slides, 1 cell block 1. FNA Lung (EUS), DALTON endobronchial biopsy in formalin, 3 slides 2. Bronchial Brush, bronchial brushing, 4 slides, cytology brush 3. FNA Lung (EUS), station 7, total passes = 5, 6 air slides, none 4. FNA Lung (EUS), station 10L, total passes =4, 3 air slides, none 5. FNA Lung (EUS), station 11R, total passes = 2, 2 air slides, al [facility]    Value: 1. Left upper lobe, fine needle biopsy (3 smears, 1 cell block):   Fragments of reactive bronchial mucosa with submucosal lymphoid   proliferation, see comment   Negative for evidence of carcinoma   2. Bronchial brushing, for cytology (1 ThinPrep, 4 smears):   Negative for malignant cells.   Blood present.   Reactive bronchial cells present   3. Station 7 lymph node, fine needle aspiration (6 smears, 1 cell block):   Negative for carcinoma   Fragments of fibrotic lymph node tissue with granuloma formation   AFB and GMS special stains are negative for organisms   AE1/AE3 is negative for evidence of carcinoma   CD68 stain highlights macrophages/granulomas   4. Station 10L lymph node, fine needle aspiration (3 smears, 1 cell block):   Negative for carcinoma   Fragments of reactive bronchial mucosa, fibrotic lymph node tissue and   cartilage   5. Station 11R lymph node, fine needle aspiration (2 smears, 1 cell block):   Negative for carcinoma   Fragments of fibrotic lymph node tissue and cartilage    Comment: Interp By Kaylin Lozoya M.D., Signed on  03/24/2023 at 07:52       US FNA Biopsy w/ Imaging 1st Lesion  Narrative: EXAMINATION:  US FINE NEEDLE ASPIRATION BIOPSY, FIRST LESION    CLINICAL HISTORY:  lymph node biopsy;  Localized enlarged lymph nodes    TECHNIQUE:  Real-time ultrasonography was utilized to perform a fine needle aspiration.  Grayscale and color Doppler spot images were obtained.    COMPARISON:  02/22/2023 PET scan    FINDINGS:  The patient was informed of the risks, benefits, and alternatives to the procedure and had  questions answered. The patient demonstrated verbal understanding and signed the informed consent.    The patient was then prepped and draped in a sterile fashion and local anesthesia was achieved via a 1% lidocaine solution.    Using sonographic guidance, a 22-gauge needle was then inserted into the right neck nodule and a sample was returned. Four passes were made.  The patient tolerated the procedure without an immediate complication.  Impression: Ultrasound guided fine-needle aspiration of a right neck nodule    Electronically signed by: Herson Richardson MD  Date:    03/01/2023  Time:    15:41      Office Spirometry Results:     No flowsheet data found.  Pulmonary Studies Review 3/24/2023   SpO2 -   Ordering Provider -   Interpreting Provider -   Performing nurse/tech/RT -   Diagnosis -   Height 63   Weight 3200   BMI (Calculated) 35.4   Predicted Distance 225.68   Patient Race -   6MWT Status -   Patient Reported -   Was O2 used? -   Delivery Method -   6MW Distance walked (feet) -   Distance walked (meters) -   Did patient stop? -   How many times? -   Stop Time 1 -   Restart Time 1 -   Did patient restart? -   Type of assistive device(s) used? -   Is extra documentation required for this patient? -   Oxygen Saturation -   Supplemental Oxygen -   Heart Rate -   Blood Pressure -   Kings Dyspnea Rating  -   Oxygen Saturation -   Supplemental Oxygen -   Heart Rate -   Blood Pressure -   Kings Dyspnea Rating  -   Recovery Time  (seconds) -   Oxygen Saturation -   Supplemental Oxygen -   Heart Rate -   Blood Pressure -   Kings Dyspnea Rating  -   Is procedure ready for interpretation? -   Did the patient stop or pause? -   How many times did the patient stop or pause? -   Stop Time 1 -   Restart Time 1 -   Pause Time 1 -   Total Time Walked (Calculated) -   Total Laps Walked -   Final Partial Lap Distance (feet) -   Total Distance Feet (Calculated) -   Total Distance Meters (Calculated) -   Predicted Distance Meters (Calculated) 369.13   Percentage of Predicted (Calculated) -   Peak VO2 (Calculated) -   Mets -   Has The Patient Had a Previous Six Minute Walk Test? -   Oxygen Qualification? -   Oxygen Saturation -   Supplemental Oxygen -   Heart Rate -   Blood Pressure -   Kings Dyspnea Rating  -   Oxygen Saturation -   Supplemental Oxygen -   Heart Rate -   Blood Pressure -   Kings Dyspnea Rating  -   Recovery Time (seconds) -   Oxygen Saturation -   Supplemental Oxygen -   Heart Rate -   Blood Pressure -   Kings Dyspnea Rating  -         Assessment:       Abnormal PET scan of lung  S/p Bronchoscopy with endobronchial ultrasound  Cytology final report - no cancer - lymph node studies for lymphoma are pending    All specimens from bronchoscopy are reported and negative for cancer but additional stains for lymphoma are pending and should be available by 3/29/2023    Chronic respiratory failure with hypoxia  Patient with Hypoxic Respiratory failure which is Chronic.  she is on home oxygen at 2- 3 LPM. Supplemental oxygen was provided and noted-  .   Signs/symptoms of respiratory failure include- increased work of breathing. Contributing diagnoses includes - Pleural effusion Labs and images were reviewed. Patient Has not had a recent ABG. Will treat underlying causes and adjust management of respiratory failure as follows- check for enlargement of pleural effusion     Abnormal PET scan of lung  -     X-Ray Chest Lateral Decubitus; Future; Expected  date: 03/24/2023  -     X-Ray Chest PA And Lateral; Future; Expected date: 03/24/2023    Exercise hypoxemia  -     X-Ray Chest Lateral Decubitus; Future; Expected date: 03/24/2023  -     X-Ray Chest PA And Lateral; Future; Expected date: 03/24/2023    Pleural effusion  -     X-Ray Chest Lateral Decubitus; Future; Expected date: 03/24/2023  -     X-Ray Chest PA And Lateral; Future; Expected date: 03/24/2023    Chronic respiratory failure with hypoxia          Outpatient Encounter Medications as of 3/24/2023   Medication Sig Dispense Refill    albuterol (PROVENTIL/VENTOLIN HFA) 90 mcg/actuation inhaler INHALE 2 PUFFS INTO THE LUNGS EVERY 4 (FOUR) HOURS AS NEEDED FOR WHEEZING 18 g 2    atorvastatin (LIPITOR) 20 MG tablet Take 20 mg by mouth.      azelastine (ASTELIN) 137 mcg (0.1 %) nasal spray 1 spray by Nasal route 2 (two) times daily.      FLUoxetine 20 MG capsule Take 1 capsule (20 mg total) by mouth once daily. 30 capsule 11    fluticasone propionate (FLONASE) 50 mcg/actuation nasal spray 2 sprays (100 mcg total) by Each Nostril route once daily. 16 g 4    ipratropium (ATROVENT) 0.02 % nebulizer solution Take 2.5 mLs (500 mcg total) by nebulization 4 (four) times daily. Rescue 300 mL 11    iron polysacch complex-b12-fa 150-25-1 mg-mcg-mg (FERREX 150 FORTE) 150-25-1 mg-mcg-mg Cap Take 1 capsule by mouth once daily. 30 capsule 11    loratadine (CLARITIN) 10 mg tablet Take 1 tablet (10 mg total) by mouth once daily. 30 tablet 11    ondansetron (ZOFRAN-ODT) 8 MG TbDL Take 8 mg by mouth.      pantoprazole (PROTONIX) 20 MG tablet Take 1 tablet (20 mg total) by mouth once daily. 90 tablet 3    albuterol (PROVENTIL) 2.5 mg /3 mL (0.083 %) nebulizer solution Take 3 mLs (2.5 mg total) by nebulization every 4 to 6 hours as needed for Wheezing or Shortness of Breath. 360 mL 11    [DISCONTINUED] potassium gluconate 595 (99) mg Tab Take 1 tablet by mouth Daily.       No facility-administered encounter medications on file as of  3/24/2023.     Plan:       Requested Prescriptions      No prescriptions requested or ordered in this encounter     Problem List Items Addressed This Visit       Abnormal PET scan of lung - Primary    Current Assessment & Plan     S/p Bronchoscopy with endobronchial ultrasound  Cytology final report - no cancer - lymph node studies for lymphoma are pending    All specimens from bronchoscopy are reported and negative for cancer but additional stains for lymphoma are pending and should be available by 3/29/2023         Relevant Orders    X-Ray Chest Lateral Decubitus    X-Ray Chest PA And Lateral    Chronic respiratory failure with hypoxia    Current Assessment & Plan     Patient with Hypoxic Respiratory failure which is Chronic.  she is on home oxygen at 2- 3 LPM. Supplemental oxygen was provided and noted-  .   Signs/symptoms of respiratory failure include- increased work of breathing. Contributing diagnoses includes - Pleural effusion Labs and images were reviewed. Patient Has not had a recent ABG. Will treat underlying causes and adjust management of respiratory failure as follows- check for enlargement of pleural effusion           Other Visit Diagnoses       Exercise hypoxemia        Relevant Orders    X-Ray Chest Lateral Decubitus    X-Ray Chest PA And Lateral    Pleural effusion        Relevant Orders    X-Ray Chest Lateral Decubitus    X-Ray Chest PA And Lateral               Follow up for Review progress.    MEDICAL DECISION MAKING: Moderate to high complexity.  Overall, the multiple problems listed are of moderate to high severity that may impact quality of life and activities of daily living. Side effects of medications, treatment plan as well as options and alternatives reviewed and discussed with patient. There was counseling of patient concerning these issues.    Total time spent in counseling and coordination of care - 45  minutes of total time spent on the encounter, which includes face to face time  and non-face to face time preparing to see the patient (eg, review of tests), Obtaining and/or reviewing separately obtained history, Documenting clinical information in the electronic or other health record, Independently interpreting results (not separately reported) and communicating results to the patient/family/caregiver, or Care coordination (not separately reported).    Time was used in discussion of prognosis, risks, benefits of treatment, instructions and compliance with regimen . Discussion with other physicians and/or health care providers - home health or for use of durable medical equipment (oxygen, nebulizers, CPAP, BiPAP) occurred.        This service was not originating from a related E/M service provided within the previous 7 days nor will  to an E/M service or procedure within the next 24 hours or my soonest available appointment.  Prevailing standard of care was able to be met in this visit.

## 2023-03-24 NOTE — PROGRESS NOTES
"Subjective:      The patient location is:  [id number] [place] [id number]    The chief complaint leading to consultation is: follow up for bronchoscopy  Visit type: Virtual visit with synchronous audio and video     Each patient to whom he or she provides medical services by telemedicine is:  (1) informed of the relationship between the physician and patient and the respective role of any other health care provider with respect to management of the patient; and (2) notified that he or she may decline to receive medical services by telemedicine and may withdraw from such care at any time.    Notes: worsening shortness of breath        Patient ID: Dory Xie is a 73 y.o. female.    Chief Complaint:        HPI   Follow up for bronchoscopy   Bronch w bAL , forceps biopsy brush of DALTON   EBUS of station 7 , 10 L , 11 R    HPI   73 y/owith lung mass and positive PET - worsening shortness of breath since last seen  Underwent CT guided needle BX of lymph nodes and bronchoscopy with endobronchial ultrasound - no specific diagnosis obtained  Had a small pleural effusion on CT will recheck     Oxygen low when at rest on room air 83%  Hx of pleural effusion     NO fever or chills, no sputum production but " congested "    All specimens from bronchoscopy are reported and negative for cancer but additional stains for lymphoma are pending and should be available by 3/29/2023    Past Medical History:   Diagnosis Date    Arthritis     Cancer     cervical    Chronic diastolic (congestive) heart failure 02/10/2023    Chronic pain     From osteoarthritis    Colitis     GERD (gastroesophageal reflux disease)     Seasonal allergies      Past Surgical History:   Procedure Laterality Date    BRONCHOSCOPY N/A 3/16/2023    Procedure: BRONCHOSCOPY;  Surgeon: Agatha Rhoades MD;  Location: [facility];  Service: Pulmonary;  Laterality: N/A;    COLONOSCOPY N/A 09/21/2016    Procedure: COLONOSCOPY;  Surgeon: Hector Summers III, " MD;  Location: [facility] ENDO;  Service: Endoscopy;  Laterality: N/A;    ENDOBRONCHIAL ULTRASOUND Bilateral 3/16/2023    Procedure: ENDOBRONCHIAL ULTRASOUND (EBUS);  Surgeon: Agatha Rhoades MD;  Location: [facility] ENDO;  Service: Pulmonary;  Laterality: Bilateral;    EYE SURGERY      HYSTERECTOMY      29yrs old    JOINT REPLACEMENT  Both knees    KNEE SURGERY Left     KNEE SURGERY Right     TONSILLECTOMY      TOTAL KNEE ARTHROPLASTY Bilateral 2019    TUBAL LIGATION       Review of patient's allergies indicates:  No Known Allergies    Current Outpatient Medications on File Prior to Visit   Medication Sig Dispense Refill    albuterol (PROVENTIL/VENTOLIN HFA) 90 mcg/actuation inhaler INHALE 2 PUFFS INTO THE LUNGS EVERY 4 (FOUR) HOURS AS NEEDED FOR WHEEZING 18 g 2    atorvastatin (LIPITOR) 20 MG tablet Take 20 mg by mouth.      azelastine (ASTELIN) 137 mcg (0.1 %) nasal spray 1 spray by Nasal route 2 (two) times daily.      FLUoxetine 20 MG capsule Take 1 capsule (20 mg total) by mouth once daily. 30 capsule 11    fluticasone propionate (FLONASE) 50 mcg/actuation nasal spray 2 sprays (100 mcg total) by Each Nostril route once daily. 16 g 4    ipratropium (ATROVENT) 0.02 % nebulizer solution Take 2.5 mLs (500 mcg total) by nebulization 4 (four) times daily. Rescue 300 mL 11    iron polysacch complex-b12-fa 150-25-1 mg-mcg-mg (FERREX 150 FORTE) 150-25-1 mg-mcg-mg Cap Take 1 capsule by mouth once daily. 30 capsule 11    loratadine (CLARITIN) 10 mg tablet Take 1 tablet (10 mg total) by mouth once daily. 30 tablet 11    ondansetron (ZOFRAN-ODT) 8 MG TbDL Take 8 mg by mouth.      pantoprazole (PROTONIX) 20 MG tablet Take 1 tablet (20 mg total) by mouth once daily. 90 tablet 3    albuterol (PROVENTIL) 2.5 mg /3 mL (0.083 %) nebulizer solution Take 3 mLs (2.5 mg total) by nebulization every 4 to 6 hours as needed for Wheezing or Shortness of Breath. 360 mL 11    [DISCONTINUED] potassium gluconate 595 (99) mg Tab Take 1 tablet by  "mouth Daily.       No current facility-administered medications on file prior to visit.     Social History     Socioeconomic History    Marital status: Single   Tobacco Use    Smoking status: Never     Passive exposure: Never    Smokeless tobacco: Never   Substance and Sexual Activity    Alcohol use: No    Drug use: No     @FAM@  Review of Systems   Respiratory:  Positive for cough, sputum production, shortness of breath and dyspnea on extertion.      Objective:        Ht 5' 3" (1.6 m)   Wt 90.7 kg (200 lb)   BMI 35.43 kg/m²     The patient has been recording vital signs at home and the following data was reviewed to make a evaluation and management decision:  Review of data:  No flowsheet data found.  No flowsheet data found.  Wt Readings from Last 1 Encounters:   03/24/23 90.7 kg (200 lb)     Ht Readings from Last 1 Encounters:   03/24/23 5' 3" (1.6 m)       Constitutional: Patient is oriented to person, place and time.  He appears well developed and well nourished.  Neurologic: He is alert and orientated.  Psychiatric: He has appropriate mood and affect. His behavior during the interview as normal. Judgement and thought content - normal.      Personal Diagnostic Review  Non diagnostic endobronchial ultrasound - Surgical path pending    [facility] PATHOLOGY & LABORATORY MEDICINE [facility] [place] [address] Ph [phone] Fax [phone] CYTOLOGY REPORT NGUYEN SAVAGE [facility] RADHA MONTGOMERY 1. Left upper lobe, fine needle biopsy (3 smears, 1 cell block): Fragments of reactive bronchial mucosa with submucosal lymphoid proliferation, see comment Negative for evidence of carcinoma ORDERING / ATTENDING PHYSICIAN(S) Gross Description SPECIMEN FINAL PATHOLOGIC DIAGNOSIS Ordering: Attending: CLINICAL INFORMATION Mass of upper lobe of left lung, mediastinal lymphadenopathy SPECIMEN #1(DALTON BX): Received 3 Stained AD slides, 1 formalin " jar with several small cores;th 3 slides, 1 cell block SPECIMEN #2(BB): Received 4 Stained AD slides, 1 brush tip in clear fluid;th 4 slides, 1 ngtp SPECIMEN #3(STA 7): Received 6 Stained AD slides, 1 formalin jar with multiple core specs;th 6 slides, 1 cell block SPECIMEN #4(10L); Received 3 Stained AD slides, 1 formalin jar with multiple core specs;th 3 slides, 1 cell block SPECIMEN #5(11R): Received 2 Stained AD slides, 1 formalin jar with multiple core specs: th 2 slides, 1 cell block 1. FNA Lung (EUS), DALTON endobronchial biopsy in formalin, 3 slides 2. Bronchial Brush, bronchial brushing, 4 slides, cytology brush 3. FNA Lung (EUS), station 7, total passes = 5, 6 air slides, none 4. FNA Lung (EUS), station 10L, total passes =4, 3 air slides, none 5. FNA Lung (EUS), station 11R, total passes = 2, 2 air slides, al [facility]    Value: 1. Left upper lobe, fine needle biopsy (3 smears, 1 cell block):   Fragments of reactive bronchial mucosa with submucosal lymphoid   proliferation, see comment   Negative for evidence of carcinoma   2. Bronchial brushing, for cytology (1 ThinPrep, 4 smears):   Negative for malignant cells.   Blood present.   Reactive bronchial cells present   3. Station 7 lymph node, fine needle aspiration (6 smears, 1 cell block):   Negative for carcinoma   Fragments of fibrotic lymph node tissue with granuloma formation   AFB and GMS special stains are negative for organisms   AE1/AE3 is negative for evidence of carcinoma   CD68 stain highlights macrophages/granulomas   4. Station 10L lymph node, fine needle aspiration (3 smears, 1 cell block):   Negative for carcinoma   Fragments of reactive bronchial mucosa, fibrotic lymph node tissue and   cartilage   5. Station 11R lymph node, fine needle aspiration (2 smears, 1 cell block):   Negative for carcinoma   Fragments of fibrotic lymph node tissue and cartilage    Comment: Interp By Kaylin Lozoya M.D., Signed on  03/24/2023 at 07:52       US FNA Biopsy w/ Imaging 1st Lesion  Narrative: EXAMINATION:  US FINE NEEDLE ASPIRATION BIOPSY, FIRST LESION    CLINICAL HISTORY:  lymph node biopsy;  Localized enlarged lymph nodes    TECHNIQUE:  Real-time ultrasonography was utilized to perform a fine needle aspiration.  Grayscale and color Doppler spot images were obtained.    COMPARISON:  02/22/2023 PET scan    FINDINGS:  The patient was informed of the risks, benefits, and alternatives to the procedure and had  questions answered. The patient demonstrated verbal understanding and signed the informed consent.    The patient was then prepped and draped in a sterile fashion and local anesthesia was achieved via a 1% lidocaine solution.    Using sonographic guidance, a 22-gauge needle was then inserted into the right neck nodule and a sample was returned. Four passes were made.  The patient tolerated the procedure without an immediate complication.  Impression: Ultrasound guided fine-needle aspiration of a right neck nodule    Electronically signed by: Herson Richardson MD  Date:    03/01/2023  Time:    15:41      Office Spirometry Results:     No flowsheet data found.  Pulmonary Studies Review 3/24/2023   SpO2 -   Ordering Provider -   Interpreting Provider -   Performing nurse/tech/RT -   Diagnosis -   Height 63   Weight 3200   BMI (Calculated) 35.4   Predicted Distance 225.68   Patient Race -   6MWT Status -   Patient Reported -   Was O2 used? -   Delivery Method -   6MW Distance walked (feet) -   Distance walked (meters) -   Did patient stop? -   How many times? -   Stop Time 1 -   Restart Time 1 -   Did patient restart? -   Type of assistive device(s) used? -   Is extra documentation required for this patient? -   Oxygen Saturation -   Supplemental Oxygen -   Heart Rate -   Blood Pressure -   Kings Dyspnea Rating  -   Oxygen Saturation -   Supplemental Oxygen -   Heart Rate -   Blood Pressure -   Kings Dyspnea Rating  -   Recovery Time  (seconds) -   Oxygen Saturation -   Supplemental Oxygen -   Heart Rate -   Blood Pressure -   Kings Dyspnea Rating  -   Is procedure ready for interpretation? -   Did the patient stop or pause? -   How many times did the patient stop or pause? -   Stop Time 1 -   Restart Time 1 -   Pause Time 1 -   Total Time Walked (Calculated) -   Total Laps Walked -   Final Partial Lap Distance (feet) -   Total Distance Feet (Calculated) -   Total Distance Meters (Calculated) -   Predicted Distance Meters (Calculated) 369.13   Percentage of Predicted (Calculated) -   Peak VO2 (Calculated) -   Mets -   Has The Patient Had a Previous Six Minute Walk Test? -   Oxygen Qualification? -   Oxygen Saturation -   Supplemental Oxygen -   Heart Rate -   Blood Pressure -   Kings Dyspnea Rating  -   Oxygen Saturation -   Supplemental Oxygen -   Heart Rate -   Blood Pressure -   Kings Dyspnea Rating  -   Recovery Time (seconds) -   Oxygen Saturation -   Supplemental Oxygen -   Heart Rate -   Blood Pressure -   Kings Dyspnea Rating  -         Assessment:       Abnormal PET scan of lung  S/p Bronchoscopy with endobronchial ultrasound  Cytology final report - no cancer - lymph node studies for lymphoma are pending    All specimens from bronchoscopy are reported and negative for cancer but additional stains for lymphoma are pending and should be available by 3/29/2023    Chronic respiratory failure with hypoxia  Patient with Hypoxic Respiratory failure which is Chronic.  she is on home oxygen at 2- 3 LPM. Supplemental oxygen was provided and noted-  .   Signs/symptoms of respiratory failure include- increased work of breathing. Contributing diagnoses includes - Pleural effusion Labs and images were reviewed. Patient Has not had a recent ABG. Will treat underlying causes and adjust management of respiratory failure as follows- check for enlargement of pleural effusion     Abnormal PET scan of lung  -     X-Ray Chest Lateral Decubitus; Future; Expected  date: 03/24/2023  -     X-Ray Chest PA And Lateral; Future; Expected date: 03/24/2023    Exercise hypoxemia  -     X-Ray Chest Lateral Decubitus; Future; Expected date: 03/24/2023  -     X-Ray Chest PA And Lateral; Future; Expected date: 03/24/2023    Pleural effusion  -     X-Ray Chest Lateral Decubitus; Future; Expected date: 03/24/2023  -     X-Ray Chest PA And Lateral; Future; Expected date: 03/24/2023    Chronic respiratory failure with hypoxia          Outpatient Encounter Medications as of 3/24/2023   Medication Sig Dispense Refill    albuterol (PROVENTIL/VENTOLIN HFA) 90 mcg/actuation inhaler INHALE 2 PUFFS INTO THE LUNGS EVERY 4 (FOUR) HOURS AS NEEDED FOR WHEEZING 18 g 2    atorvastatin (LIPITOR) 20 MG tablet Take 20 mg by mouth.      azelastine (ASTELIN) 137 mcg (0.1 %) nasal spray 1 spray by Nasal route 2 (two) times daily.      FLUoxetine 20 MG capsule Take 1 capsule (20 mg total) by mouth once daily. 30 capsule 11    fluticasone propionate (FLONASE) 50 mcg/actuation nasal spray 2 sprays (100 mcg total) by Each Nostril route once daily. 16 g 4    ipratropium (ATROVENT) 0.02 % nebulizer solution Take 2.5 mLs (500 mcg total) by nebulization 4 (four) times daily. Rescue 300 mL 11    iron polysacch complex-b12-fa 150-25-1 mg-mcg-mg (FERREX 150 FORTE) 150-25-1 mg-mcg-mg Cap Take 1 capsule by mouth once daily. 30 capsule 11    loratadine (CLARITIN) 10 mg tablet Take 1 tablet (10 mg total) by mouth once daily. 30 tablet 11    ondansetron (ZOFRAN-ODT) 8 MG TbDL Take 8 mg by mouth.      pantoprazole (PROTONIX) 20 MG tablet Take 1 tablet (20 mg total) by mouth once daily. 90 tablet 3    albuterol (PROVENTIL) 2.5 mg /3 mL (0.083 %) nebulizer solution Take 3 mLs (2.5 mg total) by nebulization every 4 to 6 hours as needed for Wheezing or Shortness of Breath. 360 mL 11    [DISCONTINUED] potassium gluconate 595 (99) mg Tab Take 1 tablet by mouth Daily.       No facility-administered encounter medications on file as of  3/24/2023.     Plan:       Requested Prescriptions      No prescriptions requested or ordered in this encounter     Problem List Items Addressed This Visit       Abnormal PET scan of lung - Primary    Current Assessment & Plan     S/p Bronchoscopy with endobronchial ultrasound  Cytology final report - no cancer - lymph node studies for lymphoma are pending    All specimens from bronchoscopy are reported and negative for cancer but additional stains for lymphoma are pending and should be available by 3/29/2023         Relevant Orders    X-Ray Chest Lateral Decubitus    X-Ray Chest PA And Lateral    Chronic respiratory failure with hypoxia    Current Assessment & Plan     Patient with Hypoxic Respiratory failure which is Chronic.  she is on home oxygen at 2- 3 LPM. Supplemental oxygen was provided and noted-  .   Signs/symptoms of respiratory failure include- increased work of breathing. Contributing diagnoses includes - Pleural effusion Labs and images were reviewed. Patient Has not had a recent ABG. Will treat underlying causes and adjust management of respiratory failure as follows- check for enlargement of pleural effusion           Other Visit Diagnoses       Exercise hypoxemia        Relevant Orders    X-Ray Chest Lateral Decubitus    X-Ray Chest PA And Lateral    Pleural effusion        Relevant Orders    X-Ray Chest Lateral Decubitus    X-Ray Chest PA And Lateral               Follow up for Review progress.    MEDICAL DECISION MAKING: Moderate to high complexity.  Overall, the multiple problems listed are of moderate to high severity that may impact quality of life and activities of daily living. Side effects of medications, treatment plan as well as options and alternatives reviewed and discussed with patient. There was counseling of patient concerning these issues.    Total time spent in counseling and coordination of care - 45  minutes of total time spent on the encounter, which includes face to face time  and non-face to face time preparing to see the patient (eg, review of tests), Obtaining and/or reviewing separately obtained history, Documenting clinical information in the electronic or other health record, Independently interpreting results (not separately reported) and communicating results to the patient/family/caregiver, or Care coordination (not separately reported).    Time was used in discussion of prognosis, risks, benefits of treatment, instructions and compliance with regimen . Discussion with other physicians and/or health care providers - home health or for use of durable medical equipment (oxygen, nebulizers, CPAP, BiPAP) occurred.        This service was not originating from a related E/M service provided within the previous 7 days nor will  to an E/M service or procedure within the next 24 hours or my soonest available appointment.  Prevailing standard of care was able to be met in this visit.

## 2023-03-24 NOTE — TELEPHONE ENCOUNTER
S/p Bronchoscopy with endobronchial ultrasound  Cytology final report - no cancer - lymph node studies for lymphoma are pending    All specimens from bronchoscopy are reported and negative for cancer but additional stains for lymphoma are pending and should be available by 3/29/2023  
17  24   ALT  12  14   BILITOT  0.3  0.3   ALKPHOS  70  65     Recent Labs      09/12/18   0841   INR  1.08     Recent Labs      09/12/18   0210   TROPONINI  0.07*       Urinalysis:      Lab Results   Component Value Date    NITRU Negative 03/30/2015    WBCUA >100 03/30/2015    BACTERIA 4+ 09/16/2010    RBCUA >100 03/30/2015    BLOODU LARGE 03/30/2015    SPECGRAV 1.020 03/30/2015    GLUCOSEU 100 03/30/2015    GLUCOSEU 100 09/16/2010         ASSESSMENT:    Active Hospital Problems    Diagnosis Date Noted    Acute on chronic respiratory failure ([facility] [J96.20] 09/12/2018    HTN (hypertension) [I10] 09/12/2018    CHF (congestive heart failure) (HCC) [I50.9] 09/12/2018    CAD (coronary artery disease) [I25.10]     DM2 (diabetes mellitus, type 2) ([facility] [E11.9] 12/30/2017    Paroxysmal atrial fibrillation ([facility] [I48.0] 03/27/2015    ESRD on hemodialysis ([facility] [N18.6, Z99.2] 03/27/2015    HLD (hyperlipidemia) [E78.5] 10/22/2014    Blindness of left eye with low vision in contralateral eye [H54.40, H54.50] 10/22/2014       PLAN:      CHF - w/ CXR demonstrating bilateral pleural effusions, vascular congestion and  w/ makedly elevated BNP on admission. Likely acute on chronic systolic failure w/ EF 07-97% by Echo April 2018. S/P Watchman device. COPD - w/ acute exacerbation. Responded to tx in ED - continue current tx per pulmonology consult - appreciated. ESRD - on HD Tues/Thurs/Sat. Nephrology consulted and appreciated in advance. Acute on Chronic Respiratory Failure - w/ hypoxia, POArrival.  Mulitfactorial 2nd to above. Continue current tx. Supplemental O2 and wean as tolerated. Pulmonology consulted and appreciated. HTN/CAD - w/ known CAD but no evidence of active signs/sxs of ischemia/failure. Currently controlled on home meds w/ vitals reviewed and documented as above. HyperLipidemia - controlled on home Statin.  Continue, w/ f/u and med adjustment w/ PCP    Afib - Rate controlled on

## 2023-03-27 ENCOUNTER — PATIENT MESSAGE (OUTPATIENT)
Dept: PULMONOLOGY | Facility: CLINIC | Age: 73
End: 2023-03-27
Payer: MEDICARE

## 2023-03-27 ENCOUNTER — HOSPITAL ENCOUNTER (OUTPATIENT)
Dept: RADIOLOGY | Facility: HOSPITAL | Age: 73
Discharge: HOME | End: 2023-03-27
Attending: INTERNAL MEDICINE
Payer: MEDICARE

## 2023-03-27 ENCOUNTER — TELEPHONE (OUTPATIENT)
Dept: PULMONOLOGY | Facility: CLINIC | Age: 73
End: 2023-03-27
Payer: MEDICARE

## 2023-03-27 DIAGNOSIS — R59.0 MEDIASTINAL LYMPHADENOPATHY: ICD-10-CM

## 2023-03-27 DIAGNOSIS — R94.2 ABNORMAL PET SCAN OF LUNG: ICD-10-CM

## 2023-03-27 DIAGNOSIS — C85.80 LYMPHOMA MALIGNANT, LARGE CELL: ICD-10-CM

## 2023-03-27 DIAGNOSIS — J90 PLEURAL EFFUSION: ICD-10-CM

## 2023-03-27 DIAGNOSIS — R59.0 MEDIASTINAL LYMPHADENOPATHY: Primary | ICD-10-CM

## 2023-03-27 DIAGNOSIS — J90 PLEURAL EFFUSION ON LEFT: Primary | ICD-10-CM

## 2023-03-27 DIAGNOSIS — R09.02 EXERCISE HYPOXEMIA: ICD-10-CM

## 2023-03-27 DIAGNOSIS — J90 PLEURAL EFFUSION: Primary | ICD-10-CM

## 2023-03-27 DIAGNOSIS — J90 PLEURAL EFFUSION, LEFT: ICD-10-CM

## 2023-03-27 DIAGNOSIS — C85.90 LYMPHOMA, UNSPECIFIED BODY REGION, UNSPECIFIED LYMPHOMA TYPE: Primary | ICD-10-CM

## 2023-03-27 PROCEDURE — 71046 X-RAY EXAM CHEST 2 VIEWS: CPT | Mod: 26,,, | Performed by: RADIOLOGY

## 2023-03-27 PROCEDURE — 71046 X-RAY EXAM CHEST 2 VIEWS: CPT | Mod: 26,76,, | Performed by: RADIOLOGY

## 2023-03-27 PROCEDURE — 71046 XR CHEST PA AND LATERAL: ICD-10-PCS | Mod: 26,,, | Performed by: RADIOLOGY

## 2023-03-27 PROCEDURE — 71046 X-RAY EXAM CHEST 2 VIEWS: CPT | Mod: TC,PN

## 2023-03-27 NOTE — TELEPHONE ENCOUNTER
CALLED TO SCHEDULE THORACENTESIS TOMORROW 3/28/2023 AT 1:00  UNABLE TO LEAVE MESSAGE ON PATIENT'S PHONE AND CONTACT PHONE (BERNARDA

## 2023-03-27 NOTE — TELEPHONE ENCOUNTER
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 I am waiting for immunostains for complete immunophenotyping. I will finish and sign out the case tomorrow 3/28/2023

## 2023-03-28 ENCOUNTER — HOSPITAL ENCOUNTER (OUTPATIENT)
Facility: HOSPITAL | Age: 73
Discharge: HOME | End: 2023-03-28
Attending: INTERNAL MEDICINE | Admitting: INTERNAL MEDICINE
Payer: MEDICARE

## 2023-03-28 ENCOUNTER — PATIENT MESSAGE (OUTPATIENT)
Dept: ENDOSCOPY | Facility: HOSPITAL | Age: 73
End: 2023-03-28
Payer: MEDICARE

## 2023-03-28 DIAGNOSIS — J90 PLEURAL EFFUSION ON LEFT: Primary | ICD-10-CM

## 2023-03-28 DIAGNOSIS — J90 PLEURAL EFFUSION, LEFT: ICD-10-CM

## 2023-03-28 LAB
APPEARANCE FLD: NORMAL
BODY FLD TYPE: NORMAL
COLOR FLD: YELLOW
LYMPHOCYTES NFR FLD MANUAL: 73 %
MONOS+MACROS NFR FLD MANUAL: 15 %
NEUTROPHILS NFR FLD MANUAL: 12 %
WBC # FLD: 2460 /CU MM

## 2023-03-28 PROCEDURE — 87116 MYCOBACTERIA CULTURE: CPT | Performed by: INTERNAL MEDICINE

## 2023-03-28 PROCEDURE — 88342 IMHCHEM/IMCYTCHM 1ST ANTB: CPT | Mod: 26,59,, | Performed by: PATHOLOGY

## 2023-03-28 PROCEDURE — 83615 LACTATE (LD) (LDH) ENZYME: CPT | Performed by: INTERNAL MEDICINE

## 2023-03-28 PROCEDURE — 32555 PR THORACEN W/IMAG GUIDANCE: ICD-10-PCS | Mod: LT,,, | Performed by: INTERNAL MEDICINE

## 2023-03-28 PROCEDURE — 87102 FUNGUS ISOLATION CULTURE: CPT | Performed by: INTERNAL MEDICINE

## 2023-03-28 PROCEDURE — 88112 CYTOPATH CELL ENHANCE TECH: CPT | Performed by: PATHOLOGY

## 2023-03-28 PROCEDURE — 88341 PR IHC OR ICC EACH ADD'L SINGLE ANTIBODY  STAINPR: ICD-10-PCS | Mod: 26,59,, | Performed by: PATHOLOGY

## 2023-03-28 PROCEDURE — 88342 CHG IMMUNOCYTOCHEMISTRY: ICD-10-PCS | Mod: 26,59,, | Performed by: PATHOLOGY

## 2023-03-28 PROCEDURE — 88184 FLOWCYTOMETRY/ TC 1 MARKER: CPT | Performed by: PATHOLOGY

## 2023-03-28 PROCEDURE — 87015 SPECIMEN INFECT AGNT CONCNTJ: CPT | Performed by: INTERNAL MEDICINE

## 2023-03-28 PROCEDURE — 32555 ASPIRATE PLEURA W/ IMAGING: CPT | Mod: LT | Performed by: INTERNAL MEDICINE

## 2023-03-28 PROCEDURE — 88305 TISSUE EXAM BY PATHOLOGIST: CPT | Performed by: PATHOLOGY

## 2023-03-28 PROCEDURE — 88341 IMHCHEM/IMCYTCHM EA ADD ANTB: CPT | Mod: 26,59,, | Performed by: PATHOLOGY

## 2023-03-28 PROCEDURE — 88341 IMHCHEM/IMCYTCHM EA ADD ANTB: CPT | Mod: 59 | Performed by: PATHOLOGY

## 2023-03-28 PROCEDURE — 84157 ASSAY OF PROTEIN OTHER: CPT | Performed by: INTERNAL MEDICINE

## 2023-03-28 PROCEDURE — 88305 TISSUE EXAM BY PATHOLOGIST: ICD-10-PCS | Mod: 26,,, | Performed by: PATHOLOGY

## 2023-03-28 PROCEDURE — 88112 PR  CYTOPATH, CELL ENHANCE TECH: ICD-10-PCS | Mod: 26,,, | Performed by: PATHOLOGY

## 2023-03-28 PROCEDURE — 87206 SMEAR FLUORESCENT/ACID STAI: CPT | Performed by: INTERNAL MEDICINE

## 2023-03-28 PROCEDURE — 88189 PR  FLOWCYTOMETRY/READ, 16 & > MARKERS: ICD-10-PCS | Mod: ,,, | Performed by: PATHOLOGY

## 2023-03-28 PROCEDURE — 88185 FLOWCYTOMETRY/TC ADD-ON: CPT | Performed by: PATHOLOGY

## 2023-03-28 PROCEDURE — 89051 BODY FLUID CELL COUNT: CPT | Performed by: INTERNAL MEDICINE

## 2023-03-28 PROCEDURE — 32555 ASPIRATE PLEURA W/ IMAGING: CPT | Mod: LT,,, | Performed by: INTERNAL MEDICINE

## 2023-03-28 PROCEDURE — 88342 IMHCHEM/IMCYTCHM 1ST ANTB: CPT | Performed by: PATHOLOGY

## 2023-03-28 PROCEDURE — 88305 TISSUE EXAM BY PATHOLOGIST: CPT | Mod: 26,,, | Performed by: PATHOLOGY

## 2023-03-28 PROCEDURE — 88189 FLOWCYTOMETRY/READ 16 & >: CPT | Mod: ,,, | Performed by: PATHOLOGY

## 2023-03-28 PROCEDURE — 82945 GLUCOSE OTHER FLUID: CPT | Performed by: INTERNAL MEDICINE

## 2023-03-28 PROCEDURE — 88112 CYTOPATH CELL ENHANCE TECH: CPT | Mod: 26,,, | Performed by: PATHOLOGY

## 2023-03-28 NOTE — PROGRESS NOTES
Post Procedure Progress Note:  Vital signs stable  Personal review of post procedure Chest X Ray:  No evidence of pneumothorax  Radiologist report pending.    Patient stable.  Continue with post procedure monitoring and discharge/transfer plans.    Wyatt Baca MD  Pulmonary / Critical Care Medicine

## 2023-03-28 NOTE — DISCHARGE INSTRUCTIONS
Patient education: Thoracentesis   .  THORACENTESIS OVERVIEW  Thoracentesis is a procedure used to obtain a sample of fluid from the space around the lungs, called the pleural space. This fluid is called pleural fluid and normally exists only as a thin layer in the area between the lungs and chest wall. However, some conditions can cause an increased amount of pleural fluid to collect, called a pleural effusion.    During a thoracentesis, your doctor will put a needle between your ribs into the pleural space, followed by a thin catheter to collect a small sample of the pleural fluid, which can help determine why the fluid developed and what, if anything, should be done to treat it. When a large pleural effusion causes shortness of breath or other respiratory symptoms, the thoracentesis catheter can drain pleural fluid until symptoms improve.    Pleural effusions can be caused by many different conditions, including pneumonia, heart failure, cancer, or tuberculosis. In some cases, blood or other fluid may leak into the pleural space from another part of the body, causing the effusion.    A pleural effusion may be detected during a physical examination or by diagnostic studies that create an image of the chest, such as a chest X-ray, chest computed tomography (CT) scan, or chest ultrasound.    REASONS FOR THORACENTESIS  The main reasons to perform a thoracentesis are to determine the cause of the pleural fluid and to relieve shortness of breath caused by the fluid.    ?A diagnostic thoracentesis is performed by removing a small sample of pleural fluid (about 2 ounces [60 mL]) to determine the cause of a pleural effusion and to help doctors select the best treatment.    ?A therapeutic thoracentesis is used to remove a larger volume of pleural fluid (about 20 ounces [600 mL] to 40 ounces [1200 mL]) to relieve symptoms, such as shortness of breath.    By doing laboratory tests on the pleural fluid, the cause of the  pleural effusion can usually be determined. Depending on the cause, different treatments may be indicated. For instance, if the pleural fluid is infected, a patient may require insertion of a catheter into the pleural space for several days to ensure complete drainage of all infected pleural fluid.    THORACENTESIS PREPARATION  Before a thoracentesis, a chest X-ray or ultrasound will be done to identify the exact location of the pleural effusion. An ultrasound is preferred because it is more accurate than a chest X-ray or physical examination. The doctor will explain the procedure, describe potential complications, and discuss why thoracentesis is necessary. If ultrasound is not available, the doctor will review a chest X-ray and examine the chest closely by listening to the lungs with a stethoscope and tapping on the chest to determine the best area to perform the thoracentesis.    If you have a bleeding disorder or are on medications that affect blood clotting, you may need extra care to minimize the risk of bleeding. Tell your healthcare provider if you have a history of bleeding problems or if you are taking a medicine that decreases blood clotting. In some cases, a blood test will be taken before the procedure to exclude any blood clotting abnormalities caused by disease or medications. Thoracentesis, however, is considered safe for most patients with blood clotting disorders.    Ultrasound machines are used routinely in most institutions to improve the safety of the procedure. Ultrasound guidance is strongly recommended when the fluid is trapped in small pockets around the lung, as small pockets of fluid can be difficult to locate by tapping on the chest.    The procedure takes a short time and can be performed at a patient's bedside or in a physician's office.    THORACENTESIS PROCEDURE  A thoracentesis involves the following steps:    ?You will be placed in a position that allows the doctor to access the  effusion. Usually, you are asked to sit upright during the procedure. It is important to remain still during the procedure so that the fluid does not shift. You should expect the doctor to confirm with you and the staff that the procedure is being performed on the correct side of your chest.    ?After the needle insertion site is cleaned with an antiseptic solution, a small amount of numbing medicine (a local anesthetic, similar to novocaine) is injected with a small needle through the skin and into the deeper tissues between two ribs. This medicine helps minimize discomfort during the procedure.    ?A slightly larger needle attached to a syringe is then inserted where the anesthetic was injected. The needle passes between ribs into the pleural space, and then a thin plastic tube (called a catheter) is exchanged for the needle. Once the catheter is in place, the needle is removed, and fluid is withdrawn through the catheter into the syringe. If you have been experiencing symptoms from the effusion (eg, shortness of breath), a large amount of fluid may be drained, which allows the lung to expand more fully.    THORACENTESIS COMPLICATIONS  In most cases, a thoracentesis is performed without complications. When complications do occur, they are usually minor and resolve on their own or are easily treated. Potential complications include the following:    ?Pain - There may be some discomfort when the needle is inserted. Using a local anesthetic helps to reduce the pain. Pain generally resolves once the needle is removed.    ?Feeling faint - Some people may feel faint or dizzy during or after the procedure. This feeling generally resolves after lying down for a few minutes.    ?Bleeding - A blood vessel may be nicked as the needle is inserted through the skin and chest wall, causing bleeding. The bleeding is usually minor and stops on its own, although it may cause bruising around the puncture site. In rare cases, there  may be bleeding into or around the lung, requiring drainage of blood collected in the chest by insertion of a catheter or surgery.    ?Infection - Infection can develop if bacteria are introduced by the needle puncture. Using disinfectant solution to clean the area and using sterile technique during the procedure minimize this risk making infection a very rare complication.    ?Pneumothorax or collapsed lung - Occasionally, the needle used to obtain a fluid sample can puncture the lung. The hole created by the puncture usually seals quickly on its own. If it does not, air can build up around the lung, causing the lung to collapse. This is called a pneumothorax. When a pneumothorax occurs, a chest tube may be used to drain the air from the pleural space and allow the lung to re-expand. A pneumothorax happens in approximately 6 percent of thoracentesis procedures, but in less than 3 percent of procedures when the thoracentesis is performed with the assistance of ultrasound imaging.    Pneumothoraces that do occur are usually small and resolve on their own. About a third of pneumothoraces become large, continue to expand, or cause shortness of breath. In these patients, a catheter or chest tube is placed through the skin into the pleural space to withdraw the air.    ?Liver or spleen puncture - In very rare cases, the liver or spleen may be punctured during thoracentesis. Sitting upright and remaining still during the procedure helps to keep the liver and spleen away from the insertion area and minimizes the risk of this complication. Ultrasound imaging to guide placement of the thoracentesis needle also decreases the risk of these complications.    ?Pulmonary edema - Rarely after thoracentesis, a person can experience pulmonary edema, which is the sudden collection of fluid within the lung on the side of the chest where the thoracentesis was performed. Some people may experience shortness of breath or cough, but  usually recover quickly. Pulmonary edema tends to occur when a large volume of pleural fluid is removed during a therapeutic thoracentesis.    FOLLOWING THE THORACENTESIS PROCEDURE  After the procedure, the doctor will observe the insertion site for signs of bleeding and assess your breathing for signs of lung collapse (pneumothorax). If a pneumothorax is suspected, a chest X-ray will be obtained. The doctor will examine the fluid, particularly its color and consistency, and will also send the fluid for laboratory tests.    In general, sedating medicines are not used during thoracentesis. If sedating medicines are used, you will need to be observed in the office for a few hours after the procedure, and you will need assistance getting home. Patients should discuss these issues with their physician before the procedure      For any problems call the [facility]  [phone]  Physician is on call

## 2023-03-28 NOTE — PLAN OF CARE
Chest x-ray performed at bedside, no pneumothorax. Okay to discharge per MD. Dressing to site CDI. Pt received discharge instructions, they verbalized understanding. VSS. Will discharge home.

## 2023-03-28 NOTE — PROCEDURES
[facility]  Thoracentesis Left Chest  Procedure Note    SUMMARY     Date of Procedure: 03/28/2023      Procedure: Thoracentesis    Assisting Surgeon: None    Indications: shortness of breath     Pre-Operative Diagnosis: Pleural effusion    Post-Operative Diagnosis: same    Anesthesia: Lidocaine 1% used subcutaneous for local anesthesia, total 7 ml  Technical Procedures Used: Ultrasound    Description of the Findings of the Procedure:  800 cc of fluid removed    Consent: Informed consent was obtained. Risks of the procedure were discussed including: infection, bleeding, pain, pneumothorax.    Under sterile conditions the patient was positioned. Chlorhexadine solution and sterile drapes were utilized.  1% plain lidocaine was used to anesthetize between the rib space after localized under ultrasound.   While at the patient's bedside ultrasound was used to localize the greatest extent of pleural fluid. The pleural fluid characteristic were clear. The pleural fluid was 1.5 cm from the skin surface and the depth of the pleural fluid was 7 cm. The site was marked with a washable marker.     Fluid was obtained after catheter inserted without  difficulty and suction applied with minimal blood loss. The catheter was removed, a dressing was applied to the wound and wound care instructions were provided.     800 ml of clear pleural fluid was obtained. A sample was sent to Pathology for cytogenetics, flow, and cell counts, as well as for infection analysis.    Plan:    A follow up chest x-ray was ordered.  Bed Rest for 1 hours.  Tylenol 650 mg. for pain.    Significant Surgical Tasks Conducted by the Assistant(s), if Applicable: none    Complications: None; patient tolerated the procedure well.    Total IV Fluids: none    Estimated Blood Loss (EBL): minimal           Drains: none  Implants: none  Specimens: Pleural fluid sent for cytology, gram stain and culture, TB culture, fungal culture, cell count and fluid  chemistry           Condition: stable    Disposition: PACU - hemodynamically stable.    Attestation: I performed the procedure.      Wyatt Baca MD  Pulmonary / Critical Care Medicine

## 2023-03-28 NOTE — PLAN OF CARE
Daughter at bedside. Dr Baca spoke to pt and daughter. Discharge instructions given. Verbalize understanding.

## 2023-03-28 NOTE — PLAN OF CARE
US at bedside, site marked. MD prepped site in sterile fashion. Lidocaine injected in site at 12:48.

## 2023-03-28 NOTE — PLAN OF CARE
Dr. Baca removed 800____ mL (straw colored) pleural fluid. MD place dressing to site, CDI. VSS.

## 2023-03-29 ENCOUNTER — TELEPHONE (OUTPATIENT)
Dept: PULMONOLOGY | Facility: CLINIC | Age: 73
End: 2023-03-29
Payer: MEDICARE

## 2023-03-29 VITALS
HEART RATE: 81 BPM | WEIGHT: 200 LBS | OXYGEN SATURATION: 98 % | RESPIRATION RATE: 18 BRPM | SYSTOLIC BLOOD PRESSURE: 108 MMHG | TEMPERATURE: 99 F | HEIGHT: 63 IN | BODY MASS INDEX: 35.44 KG/M2 | DIASTOLIC BLOOD PRESSURE: 54 MMHG

## 2023-03-29 DIAGNOSIS — C85.80 LYMPHOMA MALIGNANT, LARGE CELL: Primary | ICD-10-CM

## 2023-03-29 LAB
BODY FLUID SOURCE, LDH: NORMAL
FLOW CYTOMETRY ANTIBODIES ANALYZED - FLUID: NORMAL
FLOW CYTOMETRY COMMENT - FLUID: NORMAL
FLOW CYTOMETRY INTERPRETATION - FLUID: NORMAL
FLUID TYPE: NORMAL
GLUCOSE FLD-MCNC: 97 MG/DL
LDH FLD L TO P-CCNC: 391 U/L
PATH INTERP FLD-IMP: NORMAL
PROT FLD-MCNC: 3.7 G/DL
SPECIMEN SOURCE: NORMAL
SPECIMEN SOURCE: NORMAL

## 2023-03-29 NOTE — LETTER
March 29, 2023    Dr. Joshua Cooper  [address]  [place] [address]       [facility]  [address] [address]  Phone: [phone]  Fax: [phone]   Patient: Dory Xie   MR Number: [id number]   YOB: 1950   Date of Visit: 3/29/2023     Dear Dr. Cooper,  I am referring my patient, Dory Xie, to you for evaluation of Diffuse large B-cell lymphoma, non-GCB subtype. See attached path report.  Other medical issues include:    She  has a past medical history of Arthritis, Cancer, Chronic diastolic (congestive) heart failure (02/10/2023), Chronic pain, Colitis, GERD (gastroesophageal reflux disease), and Seasonal allergies. Her  has a past surgical history that includes Tubal ligation; Eye surgery; Tonsillectomy; Colonoscopy (N/A, 09/21/2016); Knee surgery (Left); Knee surgery (Right); Hysterectomy; Total knee arthroplasty (Bilateral, 2019); Joint replacement (Both knees); Endobronchial ultrasound (Bilateral, 3/16/2023); Bronchoscopy (N/A, 3/16/2023); and Thoracentesis (Left, 3/28/2023). She  reports that she has never smoked. She has never been exposed to tobacco smoke. She has never used smokeless tobacco. She reports that she does not drink alcohol and does not use drugs.    She has a current medication list which includes the following prescription(s): albuterol, albuterol, atorvastatin, azelastine, fluoxetine, fluticasone propionate, ipratropium, ferrex 150 forte, loratadine, ondansetron, and pantoprazole. She has No Known Allergies.    I appreciate your assistance in her care and look forward to your findings and recommendations.  Sincerely,                          Wyatt Baca MD

## 2023-03-29 NOTE — TELEPHONE ENCOUNTER
----- Message from Karma Munoz sent at 3/29/2023  2:27 PM CDT -----  Contact: Dory  Dory is needing a call back in regards to a referral. He stated that she needs a referral to see Dr. Cooper, he's with [facility]. Please give her a call back at [phone]

## 2023-03-29 NOTE — LETTER
March 29, 2023      Joshua Cooper MD  [address]  [address]  [place] [address]           [facility]  [address] [place] [address]  Phone: [phone]  Fax: [phone]        Patient: Dory Xie   MR Number: [id number]   YOB: 1950   Date of Visit: 3/29/2023     Dear Dr. Cooper    I am referring Dory Xie to you for evaluation. Attached you will find relevant portions of my assessment and plan of care. Attached is the path report.         Hematopathology consult:   1. Left upper lobe, fine needle biopsy (3 smears, 1 cell block):   -- Diffuse large B-cell lymphoma, non-GCB subtype (see comment).   3. Station 7 lymph node, fine needle aspiration (6 smears, 1 cell block):   -- Atypical B cell proliferation (see comment).   5. Station 11R lymph node, fine needle aspiration (2 smears, 1 cell block):   -- Atypical B cell proliferation (see comment).   Comment:   Part 1.  Left upper lobe, fine needle biopsy:   Histologic sections of the specimen show diffuse infiltrates of atypical   lymphoid cells with crush artifact.  The specimen is suboptimal for   morphologic evaluation.  In relatively well-preserved areas, the lymphoid   cells are large and display irregular nuclear contour and multiple prominent   nucleoli.  Small lymphoid cells are seen in the background.   Flow cytometric analysis was not performed.  Immunohistochemical stains were   performed with adequate controls.  The large atypical lymphoid cells are   positive for CD20 (strong) and CD30 (small subset); negative for CD5, CD10   (<30%), BCL6 (<30%), BCL2 (<30%), MUM1, cyclin D1, and EBV (ELVIA in-situ   hybridization).  They display high proliferation index (Ki-67 70%).  Small   CD3-positive T-cells are seen.   The findings are consistent with diffuse large B-cell lymphoma, non-GCB   subtype.  Fluorescence in-situ hybridization for MYC/BCL2/BCL6 rearrangement   is pending.  A  supplementary report will follow.   Part 3 & 5, Station 7 & 11R lymph node, fine needle aspiration:   Histologic sections of the specimen show fragments of lymphoid tissue with   extensive crush artifact.  The specimen is suboptimal for morphologic   evaluation.  Large lymphoid cells are seen.   Flow cytometric analysis failed to detect monotypic B cells or   immunophenotypically abnormal T-cells.  Immunohistochemical stains were   performed with adequate controls for greater sensitivity and further   architectural assessment.  The lymphoid population are composed of mixed   CD3-positive T-cells and CD20-positive B-cells.  B-cells are much more   numerous than T-cells and display high proliferation index (Ki-67).   Atypical B-cells are seen.  Although flow cytometric analysis failed to   detect monotypic B-cells, B-cell lymphoma cannot be excluded.  Immunoglobulin   gene rearrangement is pending.  A supplementary report will follow.  VC      Comment     Specimen #1 demonstrated a nodular submucosal proliferation of lymphocytes.   Immunohistochemical stains performed with appropriate controls demonstrate a   mix of both CD3 and CD20 positive lymphocytes. AE1/AE3 shows no evidence of   carcinoma and chromogranin is negative.   Given the lymphocytic proliferation, this case is being sent for a   hematopathology consultation. The consultation findings will be issued in a   supplemental report when available.   All stains were performed with adequate positive and negative controls.  VC      Adequacy     Site 1: reactive bronchial cells   Site 2: blood and benign bronchial cells   Site 3: PASS 1: Blood. PASS 2: Blood. PASS 3: Blood and Bronchial cells. PASS   4: Blood   Site 4: PASS 1: Blood and bronchial cells, PASS 2: Benign, PASS 3: Benign   Site 5: PASS 1: Blood, PASS 2: Blood  VC      Disclaimer     CD20 (L26) immunohistochemical staining (close L26, DAB detection method) is   performed on formalin-fixed (10% neutral  buffered formalin), paraffin   embedded tissues sections. The presence of an appropriately colored reaction   product within the target cells is indicative of positive reactivity.   Positive staining intensity should be assessed within the context of any   background staining of the negative reagent control. This test was developed   and performance characteristics determined by [facility], Section   of Anatomic Pathology. It has been cleared by the U.S. Food and Drug   Administration.   This test was developed and its performance characteristics determined by   [facility], Department of Pathology and Laboratory Medicine. It   has not been cleared or approved by the US Food and Drug Administration. The   FDA has determined that such clearance or approval is not necessary. This   test is used for clinical purposes. It should not be regarded as   investigational or for research. This laboratory is certified under the   Clinical Laboratory Improvement Admendments (CLIA) as qualified to perform   such high complexity clinical laboratory testing   CD20 (L26) immunohistochemical staining (close L26, DAB detection method) is   performed on formalin-fixed (10% neutral buffered formalin), paraffin   embedded tissues sections. The presence of an appropriately colored reaction   product within the target cells is indicative of positive reactivity.   Positive staining intensity should be assessed within the context of any   background staining of the negative reagent control. This test was developed   and performance characteristics determined by [facility], Section   of Anatomic Pathology. It has been cleared by the U.S. Food and Drug   Administration.            If you have questions, please do not hesitate to call me. I look forward to following Dory Xie along with you.    Sincerely,    Wyatt Baca MD    Enclosure  CC:  No Recipients    If you would like to receive this  communication electronically, please contact [email or web address] or [phone] to request [employer] access.    [employer] is a tool which provides read-only access to select patient information with whom you have a relationship. It's easy to use and provides real time access to review your patients record including encounter summaries, notes, results, and demographic information.    If you feel you have received this communication in error or would no longer like to receive these types of communications, please e-mail [email or web address]

## 2023-03-29 NOTE — INTERVAL H&P NOTE
The patient has been examined and the H&P has been reviewed:    I concur with the findings and changes have been noted since the H&P was written: Abnormal Chest X Ray with pleural effusion on the left    Procedure risks, benefits and alternative options discussed and understood by patient/family.          Active Hospital Problems    Diagnosis  POA    Pleural effusion on left [J90]  Unknown      Resolved Hospital Problems   No resolved problems to display.

## 2023-03-30 ENCOUNTER — TELEPHONE (OUTPATIENT)
Dept: PULMONOLOGY | Facility: CLINIC | Age: 73
End: 2023-03-30
Payer: MEDICARE

## 2023-03-30 NOTE — TELEPHONE ENCOUNTER
Called patient  No one answered  Unable to leave message  Called and talked to daughter  Referral/Consult placed to Dr. Cooper

## 2023-03-30 NOTE — TELEPHONE ENCOUNTER
Referral sent to Dr. Joshua Cooper at [facility]    ph. [phone]   fax# [phone]

## 2023-03-30 NOTE — TELEPHONE ENCOUNTER
Referral to Dr. Cooper placed    Hematopathology consult:   1. Left upper lobe, fine needle biopsy (3 smears, 1 cell block):   -- Diffuse large B-cell lymphoma, non-GCB subtype (see comment).   3. Station 7 lymph node, fine needle aspiration (6 smears, 1 cell block):   -- Atypical B cell proliferation (see comment).   5. Station 11R lymph node, fine needle aspiration (2 smears, 1 cell block):   -- Atypical B cell proliferation (see comment).   Comment:   Part 1.  Left upper lobe, fine needle biopsy:   Histologic sections of the specimen show diffuse infiltrates of atypical   lymphoid cells with crush artifact.  The specimen is suboptimal for   morphologic evaluation.  In relatively well-preserved areas, the lymphoid   cells are large and display irregular nuclear contour and multiple prominent   nucleoli.  Small lymphoid cells are seen in the background.   Flow cytometric analysis was not performed.  Immunohistochemical stains were   performed with adequate controls.  The large atypical lymphoid cells are   positive for CD20 (strong) and CD30 (small subset); negative for CD5, CD10   (<30%), BCL6 (<30%), BCL2 (<30%), MUM1, cyclin D1, and EBV (ELVIA in-situ   hybridization).  They display high proliferation index (Ki-67 70%).  Small   CD3-positive T-cells are seen.   The findings are consistent with diffuse large B-cell lymphoma, non-GCB   subtype.  Fluorescence in-situ hybridization for MYC/BCL2/BCL6 rearrangement   is pending.  A supplementary report will follow.   Part 3 & 5, Station 7 & 11R lymph node, fine needle aspiration:   Histologic sections of the specimen show fragments of lymphoid tissue with   extensive crush artifact.  The specimen is suboptimal for morphologic   evaluation.  Large lymphoid cells are seen.   Flow cytometric analysis failed to detect monotypic B cells or   immunophenotypically abnormal T-cells.  Immunohistochemical stains were   performed with adequate controls for greater sensitivity  "and further   architectural assessment.  The lymphoid population are composed of mixed   CD3-positive T-cells and CD20-positive B-cells.  B-cells are much more   numerous than T-cells and display high proliferation index (Ki-67).   Atypical B-cells are seen.  Although flow cytometric analysis failed to   detect monotypic B-cells, B-cell lymphoma cannot be excluded.  Immunoglobulin   gene rearrangement is pending.  A supplementary report will follow.        Subjective:      The patient location is:  [address] [address]    The chief complaint leading to consultation is: follow up for bronchoscopy  Visit type: Virtual visit with synchronous audio and video     Each patient to whom he or she provides medical services by telemedicine is:  (1) informed of the relationship between the physician and patient and the respective role of any other health care provider with respect to management of the patient; and (2) notified that he or she may decline to receive medical services by telemedicine and may withdraw from such care at any time.    Notes: worsening shortness of breath        Patient ID: Dory Xie is a 73 y.o. female.    Chief Complaint:        HPI   Follow up for bronchoscopy   Bronch w bAL , forceps biopsy brush of DALTON   EBUS of station 7 , 10 L , 11 R    HPI   73 y/owith lung mass and positive PET - worsening shortness of breath since last seen  Underwent CT guided needle BX of lymph nodes and bronchoscopy with endobronchial ultrasound - no specific diagnosis obtained  Had a small pleural effusion on CT will recheck     Oxygen low when at rest on room air 83%  Hx of pleural effusion     NO fever or chills, no sputum production but " congested "    All specimens from bronchoscopy are reported and negative for cancer but additional stains for lymphoma are pending and should be available by 3/29/2023    Past Medical History:   Diagnosis Date    Arthritis     Cancer     cervical    Chronic " diastolic (congestive) heart failure 02/10/2023    Chronic pain     From osteoarthritis    Colitis     GERD (gastroesophageal reflux disease)     Seasonal allergies      Past Surgical History:   Procedure Laterality Date    BRONCHOSCOPY N/A 3/16/2023    Procedure: BRONCHOSCOPY;  Surgeon: Agatha Rhoades MD;  Location: [facility] ENDO;  Service: Pulmonary;  Laterality: N/A;    COLONOSCOPY N/A 09/21/2016    Procedure: COLONOSCOPY;  Surgeon: Hector Summers III, MD;  Location: [facility] ENDO;  Service: Endoscopy;  Laterality: N/A;    ENDOBRONCHIAL ULTRASOUND Bilateral 3/16/2023    Procedure: ENDOBRONCHIAL ULTRASOUND (EBUS);  Surgeon: Agatha Rhoades MD;  Location: [facility] ENDO;  Service: Pulmonary;  Laterality: Bilateral;    EYE SURGERY      HYSTERECTOMY      29yrs old    JOINT REPLACEMENT  Both knees    KNEE SURGERY Left     KNEE SURGERY Right     TONSILLECTOMY      TOTAL KNEE ARTHROPLASTY Bilateral 2019    TUBAL LIGATION       Review of patient's allergies indicates:  No Known Allergies    Current Outpatient Medications on File Prior to Visit   Medication Sig Dispense Refill    albuterol (PROVENTIL/VENTOLIN HFA) 90 mcg/actuation inhaler INHALE 2 PUFFS INTO THE LUNGS EVERY 4 (FOUR) HOURS AS NEEDED FOR WHEEZING 18 g 2    atorvastatin (LIPITOR) 20 MG tablet Take 20 mg by mouth.      azelastine (ASTELIN) 137 mcg (0.1 %) nasal spray 1 spray by Nasal route 2 (two) times daily.      FLUoxetine 20 MG capsule Take 1 capsule (20 mg total) by mouth once daily. 30 capsule 11    fluticasone propionate (FLONASE) 50 mcg/actuation nasal spray 2 sprays (100 mcg total) by Each Nostril route once daily. 16 g 4    ipratropium (ATROVENT) 0.02 % nebulizer solution Take 2.5 mLs (500 mcg total) by nebulization 4 (four) times daily. Rescue 300 mL 11    iron polysacch complex-b12-fa 150-25-1 mg-mcg-mg (FERREX 150 FORTE) 150-25-1 mg-mcg-mg Cap Take 1 capsule by mouth once daily. 30 capsule 11    loratadine (CLARITIN) 10 mg tablet Take 1 tablet (10 mg  "total) by mouth once daily. 30 tablet 11    ondansetron (ZOFRAN-ODT) 8 MG TbDL Take 8 mg by mouth.      pantoprazole (PROTONIX) 20 MG tablet Take 1 tablet (20 mg total) by mouth once daily. 90 tablet 3    albuterol (PROVENTIL) 2.5 mg /3 mL (0.083 %) nebulizer solution Take 3 mLs (2.5 mg total) by nebulization every 4 to 6 hours as needed for Wheezing or Shortness of Breath. 360 mL 11    [DISCONTINUED] potassium gluconate 595 (99) mg Tab Take 1 tablet by mouth Daily.       No current facility-administered medications on file prior to visit.     Social History     Socioeconomic History    Marital status: Single   Tobacco Use    Smoking status: Never     Passive exposure: Never    Smokeless tobacco: Never   Substance and Sexual Activity    Alcohol use: No    Drug use: No     @FAM@  Review of Systems   Respiratory:  Positive for cough, sputum production, shortness of breath and dyspnea on extertion.      Objective:        Ht 5' 3" (1.6 m)   Wt 90.7 kg (200 lb)   BMI 35.43 kg/m²     The patient has been recording vital signs at home and the following data was reviewed to make a evaluation and management decision:  Review of data:  No flowsheet data found.  No flowsheet data found.  Wt Readings from Last 1 Encounters:   03/24/23 90.7 kg (200 lb)     Ht Readings from Last 1 Encounters:   03/24/23 5' 3" (1.6 m)       Constitutional: Patient is oriented to person, place and time.  He appears well developed and well nourished.  Neurologic: He is alert and orientated.  Psychiatric: He has appropriate mood and affect. His behavior during the interview as normal. Judgement and thought content - normal.      Personal Diagnostic Review  Non diagnostic endobronchial ultrasound - Surgical path pending    [facility] [place] [address] Ph [phone] Fax [phone] CYTOLOGY REPORT [address] [facility] RADHA MONTGOMERY " RADHA MONTGOMERY 1. Left upper lobe, fine needle biopsy (3 smears, 1 cell block): Fragments of reactive bronchial mucosa with submucosal lymphoid proliferation, see comment Negative for evidence of carcinoma ORDERING / ATTENDING PHYSICIAN(S) Gross Description SPECIMEN FINAL PATHOLOGIC DIAGNOSIS Ordering: Attending: CLINICAL INFORMATION Mass of upper lobe of left lung, mediastinal lymphadenopathy SPECIMEN #1(DALTON BX): Received 3 Stained AD slides, 1 formalin jar with several small cores;th 3 slides, 1 cell block SPECIMEN #2(BB): Received 4 Stained AD slides, 1 brush tip in clear fluid;th 4 slides, 1 ngtp SPECIMEN #3(STA 7): Received 6 Stained AD slides, 1 formalin jar with multiple core specs;th 6 slides, 1 cell block SPECIMEN #4(10L); Received 3 Stained AD slides, 1 formalin jar with multiple core specs;th 3 slides, 1 cell block SPECIMEN #5(11R): Received 2 Stained AD slides, 1 formalin jar with multiple core specs: th 2 slides, 1 cell block 1. FNA Lung (EUS), DALTON endobronchial biopsy in formalin, 3 slides 2. Bronchial Brush, bronchial brushing, 4 slides, cytology brush 3. FNA Lung (EUS), station 7, total passes = 5, 6 air slides, none 4. FNA Lung (EUS), station 10L, total passes =4, 3 air slides, none 5. FNA Lung (EUS), station 11R, total passes = 2, 2 air slides, al    [facility] [place]    Value: 1. Left upper lobe, fine needle biopsy (3 smears, 1 cell block):   Fragments of reactive bronchial mucosa with submucosal lymphoid   proliferation, see comment   Negative for evidence of carcinoma   2. Bronchial brushing, for cytology (1 ThinPrep, 4 smears):   Negative for malignant cells.   Blood present.   Reactive bronchial cells present   3. Station 7 lymph node, fine needle aspiration (6 smears, 1 cell block):   Negative for carcinoma   Fragments of fibrotic lymph node tissue with granuloma formation   AFB and GMS special stains are negative for organisms   AE1/AE3 is negative for evidence of  carcinoma   CD68 stain highlights macrophages/granulomas   4. Station 10L lymph node, fine needle aspiration (3 smears, 1 cell block):   Negative for carcinoma   Fragments of reactive bronchial mucosa, fibrotic lymph node tissue and   cartilage   5. Station 11R lymph node, fine needle aspiration (2 smears, 1 cell block):   Negative for carcinoma   Fragments of fibrotic lymph node tissue and cartilage    Comment: Interp By Kaylin Lozoya M.D., Signed on 03/24/2023 at 07:52       US FNA Biopsy w/ Imaging 1st Lesion  Narrative: EXAMINATION:  US FINE NEEDLE ASPIRATION BIOPSY, FIRST LESION    CLINICAL HISTORY:  lymph node biopsy;  Localized enlarged lymph nodes    TECHNIQUE:  Real-time ultrasonography was utilized to perform a fine needle aspiration.  Grayscale and color Doppler spot images were obtained.    COMPARISON:  02/22/2023 PET scan    FINDINGS:  The patient was informed of the risks, benefits, and alternatives to the procedure and had  questions answered. The patient demonstrated verbal understanding and signed the informed consent.    The patient was then prepped and draped in a sterile fashion and local anesthesia was achieved via a 1% lidocaine solution.    Using sonographic guidance, a 22-gauge needle was then inserted into the right neck nodule and a sample was returned. Four passes were made.  The patient tolerated the procedure without an immediate complication.  Impression: Ultrasound guided fine-needle aspiration of a right neck nodule    Electronically signed by: Herson Richardson MD  Date:    03/01/2023  Time:    15:41      Office Spirometry Results:     No flowsheet data found.  Pulmonary Studies Review 3/24/2023   SpO2 -   Ordering Provider -   Interpreting Provider -   Performing nurse/tech/RT -   Diagnosis -   Height 63   Weight 3200   BMI (Calculated) 35.4   Predicted Distance 225.68   Patient Race -   6MWT Status -   Patient Reported -   Was O2 used? -   Delivery Method -   6MW Distance walked  (feet) -   Distance walked (meters) -   Did patient stop? -   How many times? -   Stop Time 1 -   Restart Time 1 -   Did patient restart? -   Type of assistive device(s) used? -   Is extra documentation required for this patient? -   Oxygen Saturation -   Supplemental Oxygen -   Heart Rate -   Blood Pressure -   Kings Dyspnea Rating  -   Oxygen Saturation -   Supplemental Oxygen -   Heart Rate -   Blood Pressure -   Kings Dyspnea Rating  -   Recovery Time (seconds) -   Oxygen Saturation -   Supplemental Oxygen -   Heart Rate -   Blood Pressure -   Kings Dyspnea Rating  -   Is procedure ready for interpretation? -   Did the patient stop or pause? -   How many times did the patient stop or pause? -   Stop Time 1 -   Restart Time 1 -   Pause Time 1 -   Total Time Walked (Calculated) -   Total Laps Walked -   Final Partial Lap Distance (feet) -   Total Distance Feet (Calculated) -   Total Distance Meters (Calculated) -   Predicted Distance Meters (Calculated) 369.13   Percentage of Predicted (Calculated) -   Peak VO2 (Calculated) -   Mets -   Has The Patient Had a Previous Six Minute Walk Test? -   Oxygen Qualification? -   Oxygen Saturation -   Supplemental Oxygen -   Heart Rate -   Blood Pressure -   Kings Dyspnea Rating  -   Oxygen Saturation -   Supplemental Oxygen -   Heart Rate -   Blood Pressure -   Kings Dyspnea Rating  -   Recovery Time (seconds) -   Oxygen Saturation -   Supplemental Oxygen -   Heart Rate -   Blood Pressure -   Kings Dyspnea Rating  -         Assessment:       Abnormal PET scan of lung  S/p Bronchoscopy with endobronchial ultrasound  Cytology final report - no cancer - lymph node studies for lymphoma are pending    All specimens from bronchoscopy are reported and negative for cancer but additional stains for lymphoma are pending and should be available by 3/29/2023    Chronic respiratory failure with hypoxia  Patient with Hypoxic Respiratory failure which is Chronic.  she is on home oxygen at 2- 3  LPM. Supplemental oxygen was provided and noted-  .   Signs/symptoms of respiratory failure include- increased work of breathing. Contributing diagnoses includes - Pleural effusion Labs and images were reviewed. Patient Has not had a recent ABG. Will treat underlying causes and adjust management of respiratory failure as follows- check for enlargement of pleural effusion     Abnormal PET scan of lung  -     X-Ray Chest Lateral Decubitus; Future; Expected date: 03/24/2023  -     X-Ray Chest PA And Lateral; Future; Expected date: 03/24/2023    Exercise hypoxemia  -     X-Ray Chest Lateral Decubitus; Future; Expected date: 03/24/2023  -     X-Ray Chest PA And Lateral; Future; Expected date: 03/24/2023    Pleural effusion  -     X-Ray Chest Lateral Decubitus; Future; Expected date: 03/24/2023  -     X-Ray Chest PA And Lateral; Future; Expected date: 03/24/2023    Chronic respiratory failure with hypoxia          Outpatient Encounter Medications as of 3/24/2023   Medication Sig Dispense Refill    albuterol (PROVENTIL/VENTOLIN HFA) 90 mcg/actuation inhaler INHALE 2 PUFFS INTO THE LUNGS EVERY 4 (FOUR) HOURS AS NEEDED FOR WHEEZING 18 g 2    atorvastatin (LIPITOR) 20 MG tablet Take 20 mg by mouth.      azelastine (ASTELIN) 137 mcg (0.1 %) nasal spray 1 spray by Nasal route 2 (two) times daily.      FLUoxetine 20 MG capsule Take 1 capsule (20 mg total) by mouth once daily. 30 capsule 11    fluticasone propionate (FLONASE) 50 mcg/actuation nasal spray 2 sprays (100 mcg total) by Each Nostril route once daily. 16 g 4    ipratropium (ATROVENT) 0.02 % nebulizer solution Take 2.5 mLs (500 mcg total) by nebulization 4 (four) times daily. Rescue 300 mL 11    iron polysacch complex-b12-fa 150-25-1 mg-mcg-mg (FERREX 150 FORTE) 150-25-1 mg-mcg-mg Cap Take 1 capsule by mouth once daily. 30 capsule 11    loratadine (CLARITIN) 10 mg tablet Take 1 tablet (10 mg total) by mouth once daily. 30 tablet 11    ondansetron (ZOFRAN-ODT) 8 MG TbDL  Take 8 mg by mouth.      pantoprazole (PROTONIX) 20 MG tablet Take 1 tablet (20 mg total) by mouth once daily. 90 tablet 3    albuterol (PROVENTIL) 2.5 mg /3 mL (0.083 %) nebulizer solution Take 3 mLs (2.5 mg total) by nebulization every 4 to 6 hours as needed for Wheezing or Shortness of Breath. 360 mL 11    [DISCONTINUED] potassium gluconate 595 (99) mg Tab Take 1 tablet by mouth Daily.       No facility-administered encounter medications on file as of 3/24/2023.     Plan:       Requested Prescriptions      No prescriptions requested or ordered in this encounter     Problem List Items Addressed This Visit       Abnormal PET scan of lung - Primary    Current Assessment & Plan     S/p Bronchoscopy with endobronchial ultrasound  Cytology final report - no cancer - lymph node studies for lymphoma are pending    All specimens from bronchoscopy are reported and negative for cancer but additional stains for lymphoma are pending and should be available by 3/29/2023         Relevant Orders    X-Ray Chest Lateral Decubitus    X-Ray Chest PA And Lateral    Chronic respiratory failure with hypoxia    Current Assessment & Plan     Patient with Hypoxic Respiratory failure which is Chronic.  she is on home oxygen at 2- 3 LPM. Supplemental oxygen was provided and noted-  .   Signs/symptoms of respiratory failure include- increased work of breathing. Contributing diagnoses includes - Pleural effusion Labs and images were reviewed. Patient Has not had a recent ABG. Will treat underlying causes and adjust management of respiratory failure as follows- check for enlargement of pleural effusion           Other Visit Diagnoses       Exercise hypoxemia        Relevant Orders    X-Ray Chest Lateral Decubitus    X-Ray Chest PA And Lateral    Pleural effusion        Relevant Orders    X-Ray Chest Lateral Decubitus    X-Ray Chest PA And Lateral               Follow up for Review progress.    MEDICAL DECISION MAKING: Moderate to high  complexity.  Overall, the multiple problems listed are of moderate to high severity that may impact quality of life and activities of daily living. Side effects of medications, treatment plan as well as options and alternatives reviewed and discussed with patient. There was counseling of patient concerning these issues.    Total time spent in counseling and coordination of care - 45  minutes of total time spent on the encounter, which includes face to face time and non-face to face time preparing to see the patient (eg, review of tests), Obtaining and/or reviewing separately obtained history, Documenting clinical information in the electronic or other health record, Independently interpreting results (not separately reported) and communicating results to the patient/family/caregiver, or Care coordination (not separately reported).    Time was used in discussion of prognosis, risks, benefits of treatment, instructions and compliance with regimen . Discussion with other physicians and/or health care providers - home health or for use of durable medical equipment (oxygen, nebulizers, CPAP, BiPAP) occurred.        This service was not originating from a related E/M service provided within the previous 7 days nor will  to an E/M service or procedure within the next 24 hours or my soonest available appointment.  Prevailing standard of care was able to be met in this visit.

## 2023-03-31 ENCOUNTER — TELEPHONE (OUTPATIENT)
Dept: HEMATOLOGY/ONCOLOGY | Facility: CLINIC | Age: 73
End: 2023-03-31
Payer: MEDICARE

## 2023-04-04 PROBLEM — C83.30 DIFFUSE LARGE B CELL LYMPHOMA: Status: ACTIVE | Noted: 2023-04-04

## 2023-04-05 LAB
ADEQUACY: NORMAL
COMMENT: NORMAL
FINAL PATHOLOGIC DIAGNOSIS: NORMAL
Lab: NORMAL
SUPPLEMENTAL DIAGNOSIS: NORMAL

## 2023-04-06 LAB
COMMENT: NORMAL
FINAL PATHOLOGIC DIAGNOSIS: NORMAL
Lab: NORMAL

## 2023-04-19 LAB — FUNGUS SPEC CULT: NORMAL

## 2023-04-26 NOTE — TELEPHONE ENCOUNTER
Patient would like to be referred to Dr. Antoine ag in [place].   Itraconazole Counseling:  I discussed with the patient the risks of itraconazole including but not limited to liver damage, nausea/vomiting, neuropathy, and severe allergy.  The patient understands that this medication is best absorbed when taken with acidic beverages such as non-diet cola or ginger ale.  The patient understands that monitoring is required including baseline LFTs and repeat LFTs at intervals.  The patient understands that they are to contact us or the primary physician if concerning signs are noted.

## 2023-05-01 LAB — FUNGUS SPEC CULT: NORMAL

## 2023-05-05 LAB
ACID FAST MOD KINY STN SPEC: NORMAL
MYCOBACTERIUM SPEC QL CULT: NORMAL

## 2023-05-09 ENCOUNTER — TELEPHONE (OUTPATIENT)
Dept: PULMONOLOGY | Facility: CLINIC | Age: 73
End: 2023-05-09
Payer: MEDICARE

## 2023-05-09 NOTE — TELEPHONE ENCOUNTER
----- Message from Wyatt Baca MD sent at 5/9/2023  3:10 PM CDT -----  Call back patient and schedule follow up appointment with oncology    Schedule follow up visit to discuss results.

## 2023-05-12 ENCOUNTER — TELEPHONE (OUTPATIENT)
Dept: ADMINISTRATIVE | Facility: HOSPITAL | Age: 73
End: 2023-05-12
Payer: MEDICARE

## 2023-05-12 ENCOUNTER — PATIENT MESSAGE (OUTPATIENT)
Dept: ADMINISTRATIVE | Facility: HOSPITAL | Age: 73
End: 2023-05-12
Payer: MEDICARE

## 2023-05-17 LAB
ACID FAST MOD KINY STN SPEC: NORMAL
MYCOBACTERIUM SPEC QL CULT: NORMAL

## 2023-06-19 NOTE — PROGRESS NOTES
Controlled Substances Review  [place] Prescription Drug Monitoring -  [facility] site accessed.  Patient profile reviewed:  Patient is not a hospice patient.  Overall utilization and medication profile reviewed in context of listed medical problems.  Non narcotic/controlled substances use reviewed on patient's chart and  website  Review of controlled substances from other providers..    Order Provider Info        Office phone Pager E-mail   Ordering User Wyatt Baca [phone] -- [email or web address]   Authorizing Provider Wyatt Baca -[phone] -- --   Pended By (on 03/16/2023 0947) [facility], System Message -- -- [email or web address]   Discontinued By (on 03/16/2023 1416) Agatha Rhoades -[phone] -- [email or web address]     This Order Has Been Discontinued    Order Status Reason By On   Discontinued Stop Taking at Discharge Agatha Rhoades MD 3/16/23 1416            promethazine-codeine 6.25-10 mg/5 ml (PHENERGAN WITH CODEINE) 6.25-10 mg/5 mL syrup [[id number]]    Electronically signed by: Wyatt Baca MD on 03/16/23 1032 Status: Discontinued   Ordering user: Wyatt Baca MD 03/16/23 1032 Authorized by: Wyatt Baca MD   Ordering mode: Standard   PRN reasons: Cough   Frequency: Q8H PRN 03/16/23 - 03/16/23  Released by: Wyatt Baca MD 03/16/23 1032   Discontinued by: Agatha Rhoades MD 03/16/23 1416 [Stop Taking at Discharge]   Diagnoses   Chronic cough [R05.3]   Questionnaire    Question Answer   I have reviewed the Prescription Drug Monitoring Program (PDMP) database for this patient prior to prescribing the above opioid medication Yes   Medication comments: Quantity prescribed more than 7 day supply? Yes, quantity medically necessary

## 2023-06-26 PROBLEM — J96.11 CHRONIC RESPIRATORY FAILURE WITH HYPOXIA: Status: RESOLVED | Noted: 2023-03-24 | Resolved: 2023-06-26

## 2023-07-07 ENCOUNTER — PATIENT MESSAGE (OUTPATIENT)
Dept: INFECTIOUS DISEASES | Facility: CLINIC | Age: 73
End: 2023-07-07
Payer: MEDICARE

## 2023-07-21 NOTE — TELEPHONE ENCOUNTER
----- Message from Yessy Johnson sent at 12/4/2018  1:19 PM CST -----  Contact: Pt  Pt would like nurse to contact her regarding renewal of the (meloxicam (MOBIC) 15 MG tablet) Pt asked that nurse please return call.  
Labs are fine.  I will send the [facility]  
Patient called she said she previously asked about the mobic. She said that you were waiting on her lab results to come back before you prescribed it back to her.  
When discharged, take only medications prescribed as instructed by your hospital provider    Do not stop or change any medications until you discuss changes with your own prescriber    Do not take any other medications, including left over medications from before your admission, over the counter medications or herbal supplements, unless you discuss with your own provider

## 2023-07-31 ENCOUNTER — PATIENT MESSAGE (OUTPATIENT)
Dept: PULMONOLOGY | Facility: CLINIC | Age: 73
End: 2023-07-31
Payer: MEDICARE

## 2023-07-31 ENCOUNTER — TELEPHONE (OUTPATIENT)
Dept: PULMONOLOGY | Facility: CLINIC | Age: 73
End: 2023-07-31
Payer: MEDICARE

## 2023-07-31 DIAGNOSIS — R09.02 EXERCISE HYPOXEMIA: ICD-10-CM

## 2023-07-31 DIAGNOSIS — J41.0 SIMPLE CHRONIC BRONCHITIS: ICD-10-CM

## 2023-07-31 DIAGNOSIS — G47.34 NOCTURNAL HYPOXEMIA: Primary | ICD-10-CM

## 2023-07-31 DIAGNOSIS — J96.11 CHRONIC RESPIRATORY FAILURE WITH HYPOXIA: ICD-10-CM

## 2023-07-31 NOTE — TELEPHONE ENCOUNTER
----- Message from Myla Munoz sent at 7/31/2023  9:27 AM CDT -----  Contact: [phone]  Pt is calling requesting a release stating pt doesn't need oxygen any longer  .please call back at [phone] . [place]

## 2023-08-01 ENCOUNTER — TELEPHONE (OUTPATIENT)
Dept: PULMONOLOGY | Facility: CLINIC | Age: 73
End: 2023-08-01
Payer: MEDICARE

## 2023-08-01 ENCOUNTER — PATIENT MESSAGE (OUTPATIENT)
Dept: PULMONOLOGY | Facility: CLINIC | Age: 73
End: 2023-08-01
Payer: MEDICARE

## 2023-09-07 ENCOUNTER — PATIENT MESSAGE (OUTPATIENT)
Dept: ADMINISTRATIVE | Facility: CLINIC | Age: 73
End: 2023-09-07
Payer: MEDICARE

## 2023-09-19 ENCOUNTER — OFFICE VISIT (OUTPATIENT)
Dept: INTERNAL MEDICINE | Facility: CLINIC | Age: 73
End: 2023-09-19
Payer: MEDICARE

## 2023-09-19 ENCOUNTER — PATIENT MESSAGE (OUTPATIENT)
Dept: INTERNAL MEDICINE | Facility: CLINIC | Age: 73
End: 2023-09-19

## 2023-09-19 VITALS
SYSTOLIC BLOOD PRESSURE: 132 MMHG | TEMPERATURE: 98 F | DIASTOLIC BLOOD PRESSURE: 78 MMHG | HEIGHT: 63 IN | WEIGHT: 212.31 LBS | HEART RATE: 74 BPM | OXYGEN SATURATION: 99 % | BODY MASS INDEX: 37.62 KG/M2

## 2023-09-19 DIAGNOSIS — M17.0 PRIMARY OSTEOARTHRITIS OF BOTH KNEES: ICD-10-CM

## 2023-09-19 DIAGNOSIS — E66.01 SEVERE OBESITY (BMI 35.0-39.9) WITH COMORBIDITY: ICD-10-CM

## 2023-09-19 DIAGNOSIS — C83.30 DIFFUSE LARGE B-CELL LYMPHOMA, UNSPECIFIED BODY REGION: Primary | ICD-10-CM

## 2023-09-19 DIAGNOSIS — K21.9 GASTROESOPHAGEAL REFLUX DISEASE, UNSPECIFIED WHETHER ESOPHAGITIS PRESENT: ICD-10-CM

## 2023-09-19 PROCEDURE — 3008F BODY MASS INDEX DOCD: CPT | Mod: CPTII,S$GLB,,

## 2023-09-19 PROCEDURE — 99999 PR PBB SHADOW E&M-EST. PATIENT-LVL V: ICD-10-PCS | Mod: PBBFAC,,,

## 2023-09-19 PROCEDURE — 1101F PR PT FALLS ASSESS DOC 0-1 FALLS W/OUT INJ PAST YR: ICD-10-PCS | Mod: CPTII,S$GLB,,

## 2023-09-19 PROCEDURE — 1160F RVW MEDS BY RX/DR IN RCRD: CPT | Mod: CPTII,S$GLB,,

## 2023-09-19 PROCEDURE — 3288F PR FALLS RISK ASSESSMENT DOCUMENTED: ICD-10-PCS | Mod: CPTII,S$GLB,,

## 2023-09-19 PROCEDURE — 99999 PR PBB SHADOW E&M-EST. PATIENT-LVL V: CPT | Mod: PBBFAC,,,

## 2023-09-19 PROCEDURE — 3078F DIAST BP <80 MM HG: CPT | Mod: CPTII,S$GLB,,

## 2023-09-19 PROCEDURE — 3008F PR BODY MASS INDEX (BMI) DOCUMENTED: ICD-10-PCS | Mod: CPTII,S$GLB,,

## 2023-09-19 PROCEDURE — 3075F SYST BP GE 130 - 139MM HG: CPT | Mod: CPTII,S$GLB,,

## 2023-09-19 PROCEDURE — 99214 OFFICE O/P EST MOD 30 MIN: CPT | Mod: S$GLB,,,

## 2023-09-19 PROCEDURE — 1125F PR PAIN SEVERITY QUANTIFIED, PAIN PRESENT: ICD-10-PCS | Mod: CPTII,S$GLB,,

## 2023-09-19 PROCEDURE — 3288F FALL RISK ASSESSMENT DOCD: CPT | Mod: CPTII,S$GLB,,

## 2023-09-19 PROCEDURE — 3078F PR MOST RECENT DIASTOLIC BLOOD PRESSURE < 80 MM HG: ICD-10-PCS | Mod: CPTII,S$GLB,,

## 2023-09-19 PROCEDURE — 1159F MED LIST DOCD IN RCRD: CPT | Mod: CPTII,S$GLB,,

## 2023-09-19 PROCEDURE — 99214 PR OFFICE/OUTPT VISIT, EST, LEVL IV, 30-39 MIN: ICD-10-PCS | Mod: S$GLB,,,

## 2023-09-19 PROCEDURE — 1160F PR REVIEW ALL MEDS BY PRESCRIBER/CLIN PHARMACIST DOCUMENTED: ICD-10-PCS | Mod: CPTII,S$GLB,,

## 2023-09-19 PROCEDURE — 3075F PR MOST RECENT SYSTOLIC BLOOD PRESS GE 130-139MM HG: ICD-10-PCS | Mod: CPTII,S$GLB,,

## 2023-09-19 PROCEDURE — 1159F PR MEDICATION LIST DOCUMENTED IN MEDICAL RECORD: ICD-10-PCS | Mod: CPTII,S$GLB,,

## 2023-09-19 PROCEDURE — 1101F PT FALLS ASSESS-DOCD LE1/YR: CPT | Mod: CPTII,S$GLB,,

## 2023-09-19 PROCEDURE — 1125F AMNT PAIN NOTED PAIN PRSNT: CPT | Mod: CPTII,S$GLB,,

## 2023-09-19 RX ORDER — MELOXICAM 15 MG/1
15 TABLET ORAL DAILY
Qty: 90 TABLET | Refills: 1 | Status: SHIPPED | OUTPATIENT
Start: 2023-09-19 | End: 2024-02-12

## 2023-09-19 NOTE — PROGRESS NOTES
Dory Xie  09/19/2023  [id number]    Makenna Davey MD  Patient Care Team:  Makenna Davey MD as PCP - General (Family Medicine)  Kely Salazar MD as Consulting Physician (Physical Medicine and Rehabilitation)  Iliana Benoit LPN as Care Coordinator (Internal Medicine)  Sorin Mcmanus MD as Consulting Physician (Ophthalmology)          Visit Type:an urgent visit for a new problem    Chief Complaint:  Chief Complaint   Patient presents with    Follow-up        History of Present Illness:    History of diffuse large b cell lymphoma   Followed by [facility] oncology, Dr. Cooper     ordered CT of chest in early February 2023 which demonstrated scattered bilateral pulmonary densities along with multiple enlarged mediastinal lymph nodes as well as tiny left pleural effusion. CT findings prompted further radiographic evaluation by PET CT scan. PET CT scan was subsequently completed on 2/22/2022 for further evaluation demonstrating large consolidation/masses in the bilateral lungs with marked FDG avidity, extensive metastatic disease with markedly FDG avid hilar and mediastinal, cervical chain/supraclavicular, epigastric, retroperitoneal, and pelvic lymphadenopathy. Given PET/CT findings FNA biopsy of cervical adenopathy was ordered and performed on 3/1/2023 for which pathology was nondiagnostic. Given nondiagnostic biopsy patient subsequently underwent bronchoscopy with biopsy of left upper lobe pulmonary mass on 3/16/2023 for which pathology was consistent with diffuse large B-cell lymphoma, non GCB subtype. With pathologic confirmation of diffuse large B-cell lymphoma patient was referred to medical oncology for medical management. She was ultimately initiated on R-miniCHOP on 4/12/2023 for first line systemic therapy. PET CT scan completed 6/13/2023 following 3 cycles of R-mini CHOP demonstrating complete radiographic response.    US thyroid was done on 9/8  A 1.5 cm left neck lymph node likely  corresponds to the hypermetabolic node seen on recent PET/CT scan.   2. Bilateral calcified thyroid nodules. The dominant is on the right side measuring 1 cm which has a TR score of 4, which is moderately suspicious and one-year ultrasound follow-up recommended.      She has completed chemo  Having joint pain   Would like to resume taking medicine for her arthritis   Tylenol has not been helping with the pain     CMP on 8/30  BUN 19  Creatine 1.07  GFR 55    CBC   WBC 4.6  H/H 12.8/39.1      History:  Past Medical History:   Diagnosis Date    Arthritis     Cancer     cervical    Chronic diastolic (congestive) heart failure 02/10/2023    Chronic pain     From osteoarthritis    Colitis     GERD (gastroesophageal reflux disease)     Seasonal allergies      Past Surgical History:   Procedure Laterality Date    BRONCHOSCOPY N/A 3/16/2023    Procedure: BRONCHOSCOPY;  Surgeon: Agatha Rhoades MD;  Location: [facility];  Service: Pulmonary;  Laterality: N/A;    COLONOSCOPY N/A 09/21/2016    Procedure: COLONOSCOPY;  Surgeon: Hector Summers III, MD;  Location: [facility];  Service: Endoscopy;  Laterality: N/A;    ENDOBRONCHIAL ULTRASOUND Bilateral 3/16/2023    Procedure: ENDOBRONCHIAL ULTRASOUND (EBUS);  Surgeon: Agatha Rhoades MD;  Location: [facility];  Service: Pulmonary;  Laterality: Bilateral;    EYE SURGERY      HYSTERECTOMY      29yrs old    JOINT REPLACEMENT  Both knees    KNEE SURGERY Left     KNEE SURGERY Right     THORACENTESIS Left 3/28/2023    Procedure: Thoracentesis;  Surgeon: Wyatt Baca MD;  Location: [facility];  Service: Pulmonary;  Laterality: Left;    TONSILLECTOMY      TOTAL KNEE ARTHROPLASTY Bilateral 2019    TUBAL LIGATION       Family History   Problem Relation Age of Onset    Diabetes Mother     Heart disease Mother     Cancer Mother         lung    Cancer Father         Lung    Hyperlipidemia Father     Hypertension Other     Breast cancer Sister      Social History     Socioeconomic  History    Marital status: Single   Tobacco Use    Smoking status: Never     Passive exposure: Never    Smokeless tobacco: Never   Substance and Sexual Activity    Alcohol use: No    Drug use: No     Patient Active Problem List   Diagnosis    Primary osteoarthritis of both knees    Chronic pain    Diet-controlled hyperlipidemia    Seasonal allergies    Osteoporosis    Family history of arteriosclerotic cardiovascular disease    Colitis    Vertigo    Major depressive disorder, single episode, moderate    Mediastinal lymphadenopathy    Chronic diastolic (congestive) heart failure    Mass of upper lobe of left lung    Neoplasm, metastatic    Abnormal PET scan of lung    Pleural effusion on left    Diffuse large B cell lymphoma     Review of patient's allergies indicates:  No Known Allergies    The following were reviewed at this visit: active problem list, medication list, allergies, family history, social history, and health maintenance.    Medications:  Current Outpatient Medications on File Prior to Visit   Medication Sig Dispense Refill    albuterol (PROVENTIL) 2.5 mg /3 mL (0.083 %) nebulizer solution Take 3 mLs (2.5 mg total) by nebulization every 4 to 6 hours as needed for Wheezing or Shortness of Breath. 360 mL 11    albuterol (PROVENTIL/VENTOLIN HFA) 90 mcg/actuation inhaler INHALE 2 PUFFS INTO THE LUNGS EVERY 4 (FOUR) HOURS AS NEEDED FOR WHEEZING 18 g 2    atorvastatin (LIPITOR) 20 MG tablet Take 20 mg by mouth.      azelastine (ASTELIN) 137 mcg (0.1 %) nasal spray 1 spray by Nasal route 2 (two) times daily.      FLUoxetine 20 MG capsule Take 1 capsule (20 mg total) by mouth once daily. 30 capsule 11    fluticasone propionate (FLONASE) 50 mcg/actuation nasal spray 2 sprays (100 mcg total) by Each Nostril route once daily. 16 g 4    ipratropium (ATROVENT) 0.02 % nebulizer solution Take 2.5 mLs (500 mcg total) by nebulization 4 (four) times daily. Rescue 300 mL 11    iron polysacch complex-b12-fa 150-25-1  mg-mcg-mg (FERREX 150 FORTE) 150-25-1 mg-mcg-mg Cap Take 1 capsule by mouth once daily. 30 capsule 11    loratadine (CLARITIN) 10 mg tablet Take 1 tablet (10 mg total) by mouth once daily. 30 tablet 11    ondansetron (ZOFRAN-ODT) 8 MG TbDL Take 8 mg by mouth.      pantoprazole (PROTONIX) 20 MG tablet Take 1 tablet (20 mg total) by mouth once daily. 90 tablet 3     No current facility-administered medications on file prior to visit.       Medications have been reviewed and reconciled with patient at this visit.  Barriers to medications reviewed with patient.    Adverse reactions to current medications reviewed with patient..    Over the counter medications reviewed and reconciled with patient.    Exam:  Wt Readings from Last 3 Encounters:   03/28/23 90.7 kg (200 lb)   03/24/23 90.7 kg (200 lb)   03/16/23 90.7 kg (200 lb)     Temp Readings from Last 3 Encounters:   03/28/23 98.6 °F (37 °C)   03/16/23 98.8 °F (37.1 °C) (Temporal)   01/19/23 98.4 °F (36.9 °C) (Tympanic)     BP Readings from Last 3 Encounters:   03/28/23 (!) 108/54   03/16/23 (!) 86/43   02/24/23 126/78     Pulse Readings from Last 3 Encounters:   03/28/23 81   03/16/23 86   02/24/23 90     There is no height or weight on file to calculate BMI.      Review of Systems   HENT:  Positive for hearing loss.    Eyes:  Negative for discharge.   Respiratory:  Negative for wheezing.    Cardiovascular:  Negative for chest pain and palpitations.   Gastrointestinal:  Positive for diarrhea. Negative for blood in stool, constipation and vomiting.   Genitourinary:  Negative for dysuria and hematuria.   Musculoskeletal:  Positive for joint pain. Negative for neck pain.   Neurological:  Positive for weakness. Negative for headaches.   Endo/Heme/Allergies:  Negative for polydipsia.     Answers submitted by the patient for this visit:  Review of Systems Questionnaire (Submitted on 9/19/2023)  activity change: Yes  unexpected weight change: Yes  rhinorrhea: Yes  trouble  swallowing: No  visual disturbance: No  chest tightness: No  polyuria: No  difficulty urinating: No  menstrual problem: No  joint swelling: Yes  arthralgias: Yes  confusion: No  dysphoric mood: No      Physical Exam  Vitals and nursing note reviewed.   Constitutional:       General: She is not in acute distress.     Appearance: She is well-developed. She is obese. She is not diaphoretic.   HENT:      Head: Normocephalic and atraumatic.      Right Ear: External ear normal.      Left Ear: External ear normal.   Eyes:      General:         Right eye: No discharge.         Left eye: No discharge.      Conjunctiva/sclera: Conjunctivae normal.      Pupils: Pupils are equal, round, and reactive to light.   Neck:      Thyroid: No thyromegaly.   Cardiovascular:      Rate and Rhythm: Normal rate and regular rhythm.      Heart sounds: Normal heart sounds. No murmur heard.  Pulmonary:      Effort: Pulmonary effort is normal. No respiratory distress.      Breath sounds: Normal breath sounds. No wheezing.   Abdominal:      General: Bowel sounds are normal.   Neurological:      Mental Status: She is alert and oriented to person, place, and time.   Psychiatric:         Behavior: Behavior normal.         Thought Content: Thought content normal.         Judgment: Judgment normal.         Laboratory Reviewed ({Yes)  Lab Results   Component Value Date    WBC 6.96 01/18/2023    HGB 9.6 (L) 01/18/2023    HCT 33.2 (L) 01/18/2023     01/18/2023    CHOL 184 02/17/2022    TRIG 137 02/17/2022    HDL 32 (L) 02/17/2022    ALT 7 (L) 02/15/2023    AST 13 02/15/2023     02/15/2023    K 4.0 02/15/2023     02/15/2023    CREATININE 1.1 02/15/2023    BUN 12 02/15/2023    CO2 25 02/15/2023    TSH 1.758 09/15/2017    INR 1.2 04/10/2023    HGBA1C 5.4 02/19/2014       Dory was seen today for follow-up.    Diagnoses and all orders for this visit:    Diffuse large B-cell lymphoma, unspecified body region  Continue current treatment plan  as previously prescribed with your  Oncologist     Primary osteoarthritis of both knees  -     meloxicam (MOBIC) 15 MG tablet; Take 1 tablet (15 mg total) by mouth once daily.    Severe obesity (BMI 35.0-39.9) with comorbidity  Encouraged healthy diet and exercise as tolerated to help bring BMI into normal range.      Gastroesophageal reflux disease, unspecified whether esophagitis present  Stable on medication. Continue current Plan of Care      Follow up appt with pulm  She has not used home oxygen since May     Will schedule a follow up exam with Dr. Davey in 3 months  Scheduled for CT neck in few weeks       Care Plan/Goals: Reviewed    Goals        Blood Pressure < 140/90      LDL CHOLESTEROL < 130      Weight < 190 lb (86.183 kg)             Follow up: No follow-ups on file.    After visit summary was printed and given to patient upon discharge today.  Patient goals and care plan are included in After Visit Summary.

## 2023-09-20 DIAGNOSIS — E78.5 DIET-CONTROLLED HYPERLIPIDEMIA: Primary | ICD-10-CM

## 2023-10-03 ENCOUNTER — PATIENT MESSAGE (OUTPATIENT)
Dept: INTERNAL MEDICINE | Facility: CLINIC | Age: 73
End: 2023-10-03
Payer: MEDICARE

## 2023-10-03 DIAGNOSIS — M15.9 OSTEOARTHRITIS OF MULTIPLE JOINTS, UNSPECIFIED OSTEOARTHRITIS TYPE: Primary | ICD-10-CM

## 2023-10-04 NOTE — TELEPHONE ENCOUNTER
"PT referral faxed to [facility]:    Your fax has been successfully sent to [phone] at [phone].  ------------------------------------------------------------  From: [id number]  ------------------------------------------------------------  10/4/2023 2:59:01 PM Transmission Record          Sent to [phone] with remote ID "[id number]"          Result: (0/339;0/0) Success          Page record: 1 - 4          Elapsed time: 01:23 on channel 63    "

## 2023-12-20 ENCOUNTER — OFFICE VISIT (OUTPATIENT)
Dept: INTERNAL MEDICINE | Facility: CLINIC | Age: 73
End: 2023-12-20
Payer: MEDICARE

## 2023-12-20 VITALS
RESPIRATION RATE: 16 BRPM | HEIGHT: 63 IN | OXYGEN SATURATION: 98 % | WEIGHT: 229.5 LBS | TEMPERATURE: 97 F | BODY MASS INDEX: 40.66 KG/M2 | DIASTOLIC BLOOD PRESSURE: 72 MMHG | HEART RATE: 65 BPM | SYSTOLIC BLOOD PRESSURE: 106 MMHG

## 2023-12-20 DIAGNOSIS — R59.0 MEDIASTINAL LYMPHADENOPATHY: ICD-10-CM

## 2023-12-20 DIAGNOSIS — F32.1 MAJOR DEPRESSIVE DISORDER, SINGLE EPISODE, MODERATE: ICD-10-CM

## 2023-12-20 DIAGNOSIS — G62.9 NEUROPATHY: Primary | ICD-10-CM

## 2023-12-20 DIAGNOSIS — Z12.31 SCREENING MAMMOGRAM, ENCOUNTER FOR: ICD-10-CM

## 2023-12-20 DIAGNOSIS — C83.30 DIFFUSE LARGE B-CELL LYMPHOMA, UNSPECIFIED BODY REGION: ICD-10-CM

## 2023-12-20 DIAGNOSIS — Z13.220 ENCOUNTER FOR LIPID SCREENING FOR CARDIOVASCULAR DISEASE: ICD-10-CM

## 2023-12-20 DIAGNOSIS — Z13.6 ENCOUNTER FOR LIPID SCREENING FOR CARDIOVASCULAR DISEASE: ICD-10-CM

## 2023-12-20 DIAGNOSIS — E78.5 DIET-CONTROLLED HYPERLIPIDEMIA: ICD-10-CM

## 2023-12-20 PROCEDURE — 99214 OFFICE O/P EST MOD 30 MIN: CPT | Mod: S$GLB,,, | Performed by: FAMILY MEDICINE

## 2023-12-20 PROCEDURE — 3008F PR BODY MASS INDEX (BMI) DOCUMENTED: ICD-10-PCS | Mod: CPTII,S$GLB,, | Performed by: FAMILY MEDICINE

## 2023-12-20 PROCEDURE — 3078F DIAST BP <80 MM HG: CPT | Mod: CPTII,S$GLB,, | Performed by: FAMILY MEDICINE

## 2023-12-20 PROCEDURE — 1126F AMNT PAIN NOTED NONE PRSNT: CPT | Mod: CPTII,S$GLB,, | Performed by: FAMILY MEDICINE

## 2023-12-20 PROCEDURE — 99999 PR PBB SHADOW E&M-EST. PATIENT-LVL IV: CPT | Mod: PBBFAC,,, | Performed by: FAMILY MEDICINE

## 2023-12-20 PROCEDURE — 3074F SYST BP LT 130 MM HG: CPT | Mod: CPTII,S$GLB,, | Performed by: FAMILY MEDICINE

## 2023-12-20 PROCEDURE — 3078F PR MOST RECENT DIASTOLIC BLOOD PRESSURE < 80 MM HG: ICD-10-PCS | Mod: CPTII,S$GLB,, | Performed by: FAMILY MEDICINE

## 2023-12-20 PROCEDURE — 3288F PR FALLS RISK ASSESSMENT DOCUMENTED: ICD-10-PCS | Mod: CPTII,S$GLB,, | Performed by: FAMILY MEDICINE

## 2023-12-20 PROCEDURE — 99999 PR PBB SHADOW E&M-EST. PATIENT-LVL IV: ICD-10-PCS | Mod: PBBFAC,,, | Performed by: FAMILY MEDICINE

## 2023-12-20 PROCEDURE — 1101F PR PT FALLS ASSESS DOC 0-1 FALLS W/OUT INJ PAST YR: ICD-10-PCS | Mod: CPTII,S$GLB,, | Performed by: FAMILY MEDICINE

## 2023-12-20 PROCEDURE — 1160F PR REVIEW ALL MEDS BY PRESCRIBER/CLIN PHARMACIST DOCUMENTED: ICD-10-PCS | Mod: CPTII,S$GLB,, | Performed by: FAMILY MEDICINE

## 2023-12-20 PROCEDURE — 1101F PT FALLS ASSESS-DOCD LE1/YR: CPT | Mod: CPTII,S$GLB,, | Performed by: FAMILY MEDICINE

## 2023-12-20 PROCEDURE — 1159F MED LIST DOCD IN RCRD: CPT | Mod: CPTII,S$GLB,, | Performed by: FAMILY MEDICINE

## 2023-12-20 PROCEDURE — 3288F FALL RISK ASSESSMENT DOCD: CPT | Mod: CPTII,S$GLB,, | Performed by: FAMILY MEDICINE

## 2023-12-20 PROCEDURE — 1126F PR PAIN SEVERITY QUANTIFIED, NO PAIN PRESENT: ICD-10-PCS | Mod: CPTII,S$GLB,, | Performed by: FAMILY MEDICINE

## 2023-12-20 PROCEDURE — 99214 PR OFFICE/OUTPT VISIT, EST, LEVL IV, 30-39 MIN: ICD-10-PCS | Mod: S$GLB,,, | Performed by: FAMILY MEDICINE

## 2023-12-20 PROCEDURE — 1160F RVW MEDS BY RX/DR IN RCRD: CPT | Mod: CPTII,S$GLB,, | Performed by: FAMILY MEDICINE

## 2023-12-20 PROCEDURE — 1159F PR MEDICATION LIST DOCUMENTED IN MEDICAL RECORD: ICD-10-PCS | Mod: CPTII,S$GLB,, | Performed by: FAMILY MEDICINE

## 2023-12-20 PROCEDURE — 3008F BODY MASS INDEX DOCD: CPT | Mod: CPTII,S$GLB,, | Performed by: FAMILY MEDICINE

## 2023-12-20 PROCEDURE — 3074F PR MOST RECENT SYSTOLIC BLOOD PRESSURE < 130 MM HG: ICD-10-PCS | Mod: CPTII,S$GLB,, | Performed by: FAMILY MEDICINE

## 2023-12-20 RX ORDER — FLUOXETINE HYDROCHLORIDE 40 MG/1
40 CAPSULE ORAL DAILY
Qty: 30 CAPSULE | Refills: 11 | Status: SHIPPED | OUTPATIENT
Start: 2023-12-20 | End: 2024-12-19

## 2023-12-20 RX ORDER — GABAPENTIN 300 MG/1
300 CAPSULE ORAL 3 TIMES DAILY
Qty: 90 CAPSULE | Refills: 11 | Status: SHIPPED | OUTPATIENT
Start: 2023-12-20 | End: 2024-12-19

## 2023-12-20 RX ORDER — LIDOCAINE AND PRILOCAINE 25; 25 MG/G; MG/G
CREAM TOPICAL
COMMUNITY
Start: 2023-04-05

## 2023-12-20 RX ORDER — ALLOPURINOL 100 MG/1
100 TABLET ORAL EVERY MORNING
COMMUNITY
Start: 2023-07-08

## 2023-12-20 NOTE — PROGRESS NOTES
Dory Xie  12/20/2023  [id number]    Makenna Davey MD  Patient Care Team:  Makenna Davey MD as PCP - General (Family Medicine)  Kely Salazar MD as Consulting Physician (Physical Medicine and Rehabilitation)  Iliana Benoit LPN as Care Coordinator (Internal Medicine)  Sorin Mcmanus MD as Consulting Physician (Ophthalmology)          Visit Type:a scheduled routine follow-up visit    Chief Complaint:  Chief Complaint   Patient presents with    Follow-up     3 month       History of Present Illness:    73 year old  Follow up    History of diffuse large b cell lymphoma   Followed by [facility] oncology, Dr. Cooper     Thyroid nodule. Need recheck in one year  US thyroid was done on 9/8  A 1.5 cm left neck lymph node likely corresponds to the hypermetabolic node seen on recent PET/CT scan.   2. Bilateral calcified thyroid nodules. The dominant is on the right side measuring 1 cm which has a TR score of 4, which is moderately suspicious and one-year ultrasound follow-up recommended.  She did see ENT at [facility]  Plan to do US guided FNA and then excisional lymph node biopsy    Mild CKD eGFR 55    She does report suffering a fall at home on 11/19/2023 resulting in trauma to left chest and right hip area. She has suffered with left-sided chest and upper back pain along with right hip pain since fall. Although pain is persistent and exacerbated with certain activities, the severity of pain has improved over time. Otherwise she denies any change since her previous appointment and reports feeling at baseline.     She did undergo PET CT scan on 12/4/2023 for radiographic surveillance.  Diffuse large B-cell lymphoma -high to intermediate risk according to IPI score. Status post 6 cycles of R-mini CHOP. Posttreatment PET CT scan completed 8/20/2023 demonstrates finding of newly developed level 2 cervical hypermetabolic adenopathy that was indeterminate. Given the size, location, and biopsy difficulty  recommendations were made for a short interval 6-week follow-up CT of soft tissue neck. Short interval 6-week follow-up CT neck completed 10/9/2023 continue to reveal indeterminant left-sided cervical adenopathy without any new alternate findings concerning for residual disease. Although persistent, given isolated findings and biopsy difficulty secondary to abutting carotid structures, patient ultimately elected to defer consideration of biopsy at that time and continue close radiographic surveillance.    Repeat PET CT scan completed 12/4/2023 for radiographic restaging and reevaluation of indeterminant cervical adenopathy continues to demonstrate persistent left cervical adenopathy with increased FDG avidity. PET/CT films reviewed and results discussed in detail with patient and patient's family member during today's office visit. The concern for representing relapse of her lymphoma with the persistent and increased FDG activity of cervical adenopathy was discussed with patient and patient's family. Given the concern recommendations made for referral to head and neck surgery for consultation for evaluation for potential biopsy for pathologic confirmation. Following detailed review of PET CT scan findings and indication for biopsy, patient and patient's family amenable with head and neck surgery evaluation for biopsy. Referral to head neck surgery placed.    In regards to FDG avid fracture of the left anterior third rib, believed to be posttraumatic from recent fall rather than pathologic.    2. Right thyroid lobe nodule-thyroid ultrasound completed 9/8/2023 demonstrated bilateral calcified thyroid nodules with dominant nodule on the right side measuring 1 cm with a TR score of 4 that is moderately suspicious with recommendations for 1 year follow-up ultrasound. Will continue to monitor with ongoing systemic imaging as well as 1 year follow-up ultrasound.       The path on the biopsy of the node was Diffuse level  III Large B cell lymphoma.  She will start the chem next Jan, she will see Oncology MD.    Last eGFR on labs 72  CBC WBC 4.2    HM- her Lipids are due  MMG due in Feb    Lab Results   Component Value Date    HGBA1C 5.4 02/19/2014     Neuropathy pain  She had this in fingers        History:  Past Medical History:   Diagnosis Date    Arthritis     Cancer     cervical    Chronic diastolic (congestive) heart failure 02/10/2023    Chronic pain     From osteoarthritis    Colitis     GERD (gastroesophageal reflux disease)     Seasonal allergies      Past Surgical History:   Procedure Laterality Date    BRONCHOSCOPY N/A 3/16/2023    Procedure: BRONCHOSCOPY;  Surgeon: Agatha Rhoades MD;  Location: [facility];  Service: Pulmonary;  Laterality: N/A;    COLONOSCOPY N/A 09/21/2016    Procedure: COLONOSCOPY;  Surgeon: Hector Summers III, MD;  Location: [facility];  Service: Endoscopy;  Laterality: N/A;    ENDOBRONCHIAL ULTRASOUND Bilateral 3/16/2023    Procedure: ENDOBRONCHIAL ULTRASOUND (EBUS);  Surgeon: Agatha Rhoades MD;  Location: [facility];  Service: Pulmonary;  Laterality: Bilateral;    EYE SURGERY      HYSTERECTOMY      29yrs old    JOINT REPLACEMENT  Both knees    KNEE SURGERY Left     KNEE SURGERY Right     THORACENTESIS Left 3/28/2023    Procedure: Thoracentesis;  Surgeon: Wyatt Baca MD;  Location: [facility];  Service: Pulmonary;  Laterality: Left;    TONSILLECTOMY      TOTAL KNEE ARTHROPLASTY Bilateral 2019    TUBAL LIGATION       Family History   Problem Relation Age of Onset    Diabetes Mother     Heart disease Mother     Cancer Mother         lung    Cancer Father         Lung    Hyperlipidemia Father     Hypertension Other     Breast cancer Sister      Social History     Socioeconomic History    Marital status: Single   Tobacco Use    Smoking status: Never     Passive exposure: Never    Smokeless tobacco: Never   Substance and Sexual Activity    Alcohol use: No    Drug use: No     Patient Active  Problem List   Diagnosis    Primary osteoarthritis of both knees    Chronic pain    Diet-controlled hyperlipidemia    Seasonal allergies    Osteoporosis    Family history of arteriosclerotic cardiovascular disease    Colitis    Vertigo    Major depressive disorder, single episode, moderate    Mediastinal lymphadenopathy    Chronic diastolic (congestive) heart failure    Mass of upper lobe of left lung    Neoplasm, metastatic    Abnormal PET scan of lung    Pleural effusion on left    Diffuse large B cell lymphoma     Review of patient's allergies indicates:  No Known Allergies    The following were reviewed at this visit: active problem list, medication list, allergies, family history, social history, and health maintenance.    Medications:  Current Outpatient Medications on File Prior to Visit   Medication Sig Dispense Refill    atorvastatin (LIPITOR) 20 MG tablet Take 20 mg by mouth.      azelastine (ASTELIN) 137 mcg (0.1 %) nasal spray 1 spray by Nasal route 2 (two) times daily.      LIDOcaine-prilocaine (EMLA) cream APPLY TOPICALLY ONCE FOR ONE (1) DOSE. APPLY 30 MINUTES PRIOR TO MEDIPORT USE.      loratadine (CLARITIN) 10 mg tablet Take 1 tablet (10 mg total) by mouth once daily. 30 tablet 11    meloxicam (MOBIC) 15 MG tablet Take 1 tablet (15 mg total) by mouth once daily. 90 tablet 1    pantoprazole (PROTONIX) 20 MG tablet Take 1 tablet (20 mg total) by mouth once daily. 90 tablet 3    [DISCONTINUED] FLUoxetine 20 MG capsule Take 1 capsule (20 mg total) by mouth once daily. 30 capsule 11    allopurinoL (ZYLOPRIM) 100 MG tablet Take 100 mg by mouth every morning.      ondansetron (ZOFRAN-ODT) 8 MG TbDL Take 8 mg by mouth.      [DISCONTINUED] albuterol (PROVENTIL) 2.5 mg /3 mL (0.083 %) nebulizer solution Take 3 mLs (2.5 mg total) by nebulization every 4 to 6 hours as needed for Wheezing or Shortness of Breath. (Patient not taking: Reported on 12/20/2023) 360 mL 11    [DISCONTINUED] albuterol  (PROVENTIL/VENTOLIN HFA) 90 mcg/actuation inhaler INHALE 2 PUFFS INTO THE LUNGS EVERY 4 (FOUR) HOURS AS NEEDED FOR WHEEZING (Patient not taking: Reported on 12/20/2023) 18 g 2    [DISCONTINUED] fluticasone propionate (FLONASE) 50 mcg/actuation nasal spray 2 sprays (100 mcg total) by Each Nostril route once daily. (Patient not taking: Reported on 12/20/2023) 16 g 4    [DISCONTINUED] ipratropium (ATROVENT) 0.02 % nebulizer solution Take 2.5 mLs (500 mcg total) by nebulization 4 (four) times daily. Rescue (Patient not taking: Reported on 12/20/2023) 300 mL 11    [DISCONTINUED] iron polysacch complex-b12-fa 150-25-1 mg-mcg-mg (FERREX 150 FORTE) 150-25-1 mg-mcg-mg Cap Take 1 capsule by mouth once daily. 30 capsule 11     No current facility-administered medications on file prior to visit.       Medications have been reviewed and reconciled with patient at this visit.  Barriers to medications reviewed with patient.    Adverse reactions to current medications reviewed with patient..    Over the counter medications reviewed and reconciled with patient.    Exam:  Wt Readings from Last 3 Encounters:   12/20/23 104.1 kg (229 lb 8 oz)   09/19/23 96.3 kg (212 lb 4.9 oz)   03/28/23 90.7 kg (200 lb)     Temp Readings from Last 3 Encounters:   12/20/23 97.3 °F (36.3 °C) (Tympanic)   09/19/23 98.4 °F (36.9 °C) (Tympanic)   03/28/23 98.6 °F (37 °C)     BP Readings from Last 3 Encounters:   12/20/23 106/72   09/19/23 132/78   03/28/23 (!) 108/54     Pulse Readings from Last 3 Encounters:   12/20/23 65   09/19/23 74   03/28/23 81     Body mass index is 40.65 kg/m².      Review of Systems   Constitutional: Negative.  Negative for chills and fever.   HENT: Negative.  Negative for congestion, sinus pain and sore throat.    Eyes:  Negative for blurred vision and double vision.   Respiratory:  Negative for cough, sputum production, shortness of breath and wheezing.    Cardiovascular:  Negative for chest pain, palpitations and leg swelling.    Gastrointestinal:  Negative for abdominal pain, constipation, diarrhea, heartburn, nausea and vomiting.   Genitourinary: Negative.    Musculoskeletal:  Positive for joint pain.   Skin: Negative.  Negative for rash.   Neurological:  Positive for tingling.   Endo/Heme/Allergies: Negative.  Negative for polydipsia. Does not bruise/bleed easily.   Psychiatric/Behavioral:  Negative for depression and substance abuse.      Physical Exam  Nursing note reviewed.   Pulmonary:      Effort: Pulmonary effort is normal. No respiratory distress.   Neurological:      Mental Status: She is alert and oriented to person, place, and time.   Psychiatric:         Mood and Affect: Mood normal.         Behavior: Behavior normal.         Thought Content: Thought content normal.         Judgment: Judgment normal.         Laboratory Reviewed ({Yes)  Lab Results   Component Value Date    WBC 6.96 01/18/2023    HGB 9.6 (L) 01/18/2023    HCT 33.2 (L) 01/18/2023     01/18/2023    CHOL 184 02/17/2022    TRIG 137 02/17/2022    HDL 32 (L) 02/17/2022    ALT 7 (L) 02/15/2023    AST 13 02/15/2023     02/15/2023    K 4.0 02/15/2023     02/15/2023    CREATININE 1.1 02/15/2023    BUN 12 02/15/2023    CO2 25 02/15/2023    TSH 1.758 09/15/2017    INR 1.2 04/10/2023    HGBA1C 5.4 02/19/2014       Dory was seen today for follow-up.    Diagnoses and all orders for this visit:    Neuropathy  -     gabapentin (NEURONTIN) 300 MG capsule; Take 1 capsule (300 mg total) by mouth 3 (three) times daily.    Mediastinal lymphadenopathy  Diffuse large B-cell lymphoma, unspecified body region  Will see Dr. Allison Moss Chemo per oncology    Diet-controlled hyperlipidemia  Lipids    Encounter for lipid screening for cardiovascular disease  -     Lipid Panel; Future    Screening mammogram, encounter for  -     Mammo Digital Screening Bilat w/ Isaiah; Future    Major depressive disorder, single episode, moderate  -     FLUoxetine 40 MG capsule; Take 1  capsule (40 mg total) by mouth once daily.  Increase to 40 mg                Care Plan/Goals: Reviewed    Goals        Blood Pressure < 140/90      LDL CHOLESTEROL < 130      Weight < 190 lb (86.183 kg)             Follow up: Follow up in about 6 months (around 6/20/2024).    After visit summary was printed and given to patient upon discharge today.  Patient goals and care plan are included in After Visit Summary.

## 2024-02-12 DIAGNOSIS — M17.0 PRIMARY OSTEOARTHRITIS OF BOTH KNEES: ICD-10-CM

## 2024-02-12 RX ORDER — MELOXICAM 15 MG/1
TABLET ORAL
Qty: 90 TABLET | Refills: 1 | Status: SHIPPED | OUTPATIENT
Start: 2024-02-12

## 2024-02-21 ENCOUNTER — PATIENT MESSAGE (OUTPATIENT)
Dept: INTERNAL MEDICINE | Facility: CLINIC | Age: 74
End: 2024-02-21
Payer: MEDICARE

## 2024-02-21 ENCOUNTER — HOSPITAL ENCOUNTER (OUTPATIENT)
Dept: RADIOLOGY | Facility: HOSPITAL | Age: 74
Discharge: HOME | End: 2024-02-21
Attending: FAMILY MEDICINE
Payer: MEDICARE

## 2024-02-21 DIAGNOSIS — Z12.31 SCREENING MAMMOGRAM, ENCOUNTER FOR: ICD-10-CM

## 2024-02-21 PROCEDURE — 77063 BREAST TOMOSYNTHESIS BI: CPT | Mod: 26,,, | Performed by: RADIOLOGY

## 2024-02-21 PROCEDURE — 77067 SCR MAMMO BI INCL CAD: CPT | Mod: TC,PN

## 2024-02-21 PROCEDURE — 77067 SCR MAMMO BI INCL CAD: CPT | Mod: 26,,, | Performed by: RADIOLOGY

## 2024-02-22 ENCOUNTER — TELEPHONE (OUTPATIENT)
Dept: INTERNAL MEDICINE | Facility: CLINIC | Age: 74
End: 2024-02-22
Payer: MEDICARE

## 2024-02-22 DIAGNOSIS — Z00.00 ANNUAL PHYSICAL EXAM: Primary | ICD-10-CM

## 2024-02-22 NOTE — TELEPHONE ENCOUNTER
Her oncology teams does CMP and CBC, and its up to date, so not needed    Lipid and UA ordered  [facility]

## 2024-02-22 NOTE — TELEPHONE ENCOUNTER
Patient would like lab orders placed so she can get them done in [place] after March 11th. Wants a UA to be added to labs

## 2024-03-18 ENCOUNTER — LAB VISIT (OUTPATIENT)
Dept: LAB | Facility: HOSPITAL | Age: 74
End: 2024-03-18
Attending: FAMILY MEDICINE
Payer: MEDICARE

## 2024-03-18 DIAGNOSIS — Z00.00 ANNUAL PHYSICAL EXAM: ICD-10-CM

## 2024-03-18 LAB
CHOLEST SERPL-MCNC: 161 MG/DL (ref 120–199)
CHOLEST/HDLC SERPL: 4.5 {RATIO} (ref 2–5)
HDLC SERPL-MCNC: 36 MG/DL (ref 40–75)
HDLC SERPL: 22.4 % (ref 20–50)
LDLC SERPL CALC-MCNC: 83 MG/DL (ref 63–159)
NONHDLC SERPL-MCNC: 125 MG/DL
TRIGL SERPL-MCNC: 210 MG/DL (ref 30–150)

## 2024-03-18 PROCEDURE — 36415 COLL VENOUS BLD VENIPUNCTURE: CPT | Mod: PN | Performed by: FAMILY MEDICINE

## 2024-03-18 PROCEDURE — 80061 LIPID PANEL: CPT | Performed by: FAMILY MEDICINE

## 2024-03-19 ENCOUNTER — PATIENT MESSAGE (OUTPATIENT)
Dept: INTERNAL MEDICINE | Facility: CLINIC | Age: 74
End: 2024-03-19
Payer: MEDICARE

## 2024-03-19 DIAGNOSIS — Z00.00 ANNUAL PHYSICAL EXAM: Primary | ICD-10-CM

## 2024-04-17 DIAGNOSIS — J30.2 SEASONAL ALLERGIC RHINITIS: ICD-10-CM

## 2024-04-17 NOTE — TELEPHONE ENCOUNTER
No care due was identified.  [facility] Embedded Care Due Messages. Reference number: [id number].   4/17/2024 11:28:11 AM CDT

## 2024-04-18 RX ORDER — FLUTICASONE PROPIONATE 50 MCG
2 SPRAY, SUSPENSION (ML) NASAL
Qty: 16 ML | Refills: 4 | OUTPATIENT
Start: 2024-04-18

## 2024-04-18 NOTE — TELEPHONE ENCOUNTER
Refill Decision Note   Dory Xie  is requesting a refill authorization.    Brief Assessment and Rationale for Refill:   Quick Discontinue       Medication Therapy Plan:   discontinued on 12/20/2023 by Makenna Davey MD      Comments:     Note composed:9:45 AM 04/18/2024

## 2024-06-13 ENCOUNTER — PATIENT MESSAGE (OUTPATIENT)
Dept: INTERNAL MEDICINE | Facility: CLINIC | Age: 74
End: 2024-06-13
Payer: MEDICARE

## 2024-08-22 DIAGNOSIS — M17.0 PRIMARY OSTEOARTHRITIS OF BOTH KNEES: ICD-10-CM

## 2024-08-22 PROBLEM — J41.0 SIMPLE CHRONIC BRONCHITIS: Status: ACTIVE | Noted: 2024-08-22

## 2024-08-22 PROBLEM — E04.1 NODULE OF RIGHT LOBE OF THYROID GLAND: Status: ACTIVE | Noted: 2024-08-22

## 2024-08-22 RX ORDER — MELOXICAM 15 MG/1
TABLET ORAL
Qty: 90 TABLET | Refills: 1 | Status: SHIPPED | OUTPATIENT
Start: 2024-08-22

## 2024-08-22 NOTE — ASSESSMENT & PLAN NOTE
Per Dr. Cooper Right thyroid lobe nodule-status post biopsy by FNA on 1/10/2024 by ENT with pathologic findings consistent with colloid nodule.

## 2024-08-22 NOTE — TELEPHONE ENCOUNTER
Refill Routing Note   Medication(s) are not appropriate for processing by [facility] for the following reason(s):        Outside of protocol    [facility] action(s):  Route               Appointments  past 12m or future 3m with PCP    Date Provider   Last Visit   9/19/2023 Tina Bland, NP   Next Visit   Visit date not found Tina Bland, NP   ED visits in past 90 days: 0        Note composed:3:58 PM 08/22/2024

## 2024-08-23 ENCOUNTER — OFFICE VISIT (OUTPATIENT)
Dept: HOME HEALTH SERVICES | Facility: CLINIC | Age: 74
End: 2024-08-23
Payer: MEDICARE

## 2024-08-23 VITALS
BODY MASS INDEX: 34.55 KG/M2 | SYSTOLIC BLOOD PRESSURE: 81 MMHG | WEIGHT: 195 LBS | OXYGEN SATURATION: 98 % | DIASTOLIC BLOOD PRESSURE: 62 MMHG | TEMPERATURE: 98 F | HEIGHT: 63 IN | HEART RATE: 79 BPM

## 2024-08-23 DIAGNOSIS — M17.0 PRIMARY OSTEOARTHRITIS OF BOTH KNEES: ICD-10-CM

## 2024-08-23 DIAGNOSIS — E78.00 PURE HYPERCHOLESTEROLEMIA: ICD-10-CM

## 2024-08-23 DIAGNOSIS — C83.30 DIFFUSE LARGE B-CELL LYMPHOMA, UNSPECIFIED BODY REGION: ICD-10-CM

## 2024-08-23 DIAGNOSIS — E04.1 NODULE OF RIGHT LOBE OF THYROID GLAND: ICD-10-CM

## 2024-08-23 DIAGNOSIS — I95.1 ORTHOSTATIC HYPOTENSION: ICD-10-CM

## 2024-08-23 DIAGNOSIS — Z00.00 ENCOUNTER FOR PREVENTIVE HEALTH EXAMINATION: Primary | ICD-10-CM

## 2024-08-23 DIAGNOSIS — M85.80 OSTEOPENIA, UNSPECIFIED LOCATION: ICD-10-CM

## 2024-08-23 DIAGNOSIS — F32.1 MAJOR DEPRESSIVE DISORDER, SINGLE EPISODE, MODERATE: ICD-10-CM

## 2024-08-23 DIAGNOSIS — R26.9 ABNORMALITY OF GAIT AND MOBILITY: ICD-10-CM

## 2024-08-23 DIAGNOSIS — J41.0 SIMPLE CHRONIC BRONCHITIS: ICD-10-CM

## 2024-08-23 DIAGNOSIS — I50.32 CHRONIC DIASTOLIC (CONGESTIVE) HEART FAILURE: ICD-10-CM

## 2024-08-23 DIAGNOSIS — E66.9 OBESITY (BMI 30.0-34.9): ICD-10-CM

## 2024-08-23 DIAGNOSIS — J30.2 SEASONAL ALLERGIES: ICD-10-CM

## 2024-08-23 PROBLEM — E66.811 OBESITY (BMI 30.0-34.9): Status: ACTIVE | Noted: 2024-08-23

## 2024-08-23 PROBLEM — J90 PLEURAL EFFUSION ON LEFT: Status: RESOLVED | Noted: 2023-03-28 | Resolved: 2024-08-23

## 2024-08-23 NOTE — Clinical Note
Medicare awv complete. Health maintenance:  tetanus, rsv, and shingles vaccines due-encouraged pt to obtain at a pharmacy.  Pt declined further covid 19 vaccines.   Orthostatic hypotension New. Patient c/o lightheadedness every day.  She had to hold onto the wall to not fall there.  She does have a Rollator that she uses occasionally.  Blood pressure sitting 104/74 heart rate 71.  Blood pressure standing dropped to 81/62 heart rate 79.  I recommended patient drink more fluids, wear compression stockings, check blood pressures daily,  and see her cardiologist Dr. Urban seals and  follow-up with PCP.
[FreeTextEntry1] : Mr Gonzales is an 79 year old man followed for hydrocele, past abnormal urine cytology, and BPH. The patient had a hydrocelectomy in October of 2014. Fluid cytology 06/08/16 was abnormal. Fluid cytology 12/07/16 and 12/19/16 were negative for malignant cells. The patient takes finasteride 5 mg, oxybutynin, ER 10 mg, and doxazosin 8 mg once daily. Cystoscopy 12/19/16 found 2+ trabeculation of the bladder. The prostate protruded moderately into the bladder. No bladder stones or tumors. The left and right ureteral orifice visualized and were very close to prostate. I advised him to discontinue taking oxybutynin. On 11/10/17 the patient made 2500 cc of urine. The patient will now begin taking tamsulosin HCl 0.4 MG  one capsule one half hour after breakfast and doxazosin at night. He was able to urinate well over night without any pain. \par 12/10/18: The patient returned today and reported that his urination is satisfactory. Currently takes Finasteride, Tamsulosin and Doxazosin. Complains of intermittent stream and urge incontinence. Denies dysuria and gross hematuria.\par  6/10/19: Patient presents for 6 month follow up. Currently on Proscar and Doxazosin. States that he has no need to strain to initiate urination but flow is intermittent. Reports bothersome urinary urgency/frequency with occasional UUI. Nocturia. Regarding his anemia it is reported that it was evaluated by his PCP and he was told that it will be monitored but is nothing to worry about at this time. \par 6/26/19: Patient presents today for UDS and a cystoscopy. \par 7/3/19: Patient presents today for an ultrasound of the prostate. He is here today to discuss the results. His urination is the same as it was at his last visit. He wake up 4x a night to urinate. During the day he urinates 5-6x. He denies dysuria, gross hematuria, urgency or incontinence. Following the cystoscopy he noticed a small amount of blood and tiny clots but they have since resolved. He is satisfied with his urination at this time. \par 10/8/19: Patient presents today for a renal ultrasound and is here to discuss the results. Doxazosin, Finasteride and Tamsulosin were all prescribed previously. It is reported today that he has only been taking the Doxazosin and the Finasteride. His urination is still troublesome. Nocturia x 4-5 is reported. \par 10/15/19: Patient presents today for a follow up. His most recent PSA from 10/8/19 was 5.14 ng/mL a slight increase when compared to the last PSA. Finasteride is currently taken. When adjusting for the use of Finasteride his PSA is around 10 ng/mL. He is scheduled for surgery with Dr. Rolon next week. \par 11/20/19: Patient presents today for follow-up. He has completed surgery with Dr. Rolon and is improving well. He has finished his Sulfamethoxazole-Trimethoprim. His recent PSA on 11/12 was 5.15 ng/mL. He denies having a pacemaker. Patient has previous abnormal ultrasound and elevated PSA.\par \par 12/30/19: Patient presents today for a follow up. He reports waking up 2-3 times at night which is okay for him. He has been taking Finasteride and Tamsulosin which is working for him. He denies constipation or hematuria. \par \par 04/27/2021: Patient presents today for a follow up accompanied by his wife. Pt has been doing well. However, he reports that after he urinates he feels the urge to go again a few minutes later and voids a pretty large amount when he does. This incomplete emptying and double voiding has been going on for a couple of months, however the patient's wife said they were nervous to come out due to covid. Pt was on a high BP medication, Eplerenone, that was discontinued. He is currently taking tamsulosin 0.4 mg once daily after lunch, metformin BID, Doxazosin 4 mg once daily, Januvia 100 mg for diabetes once daily, hydralazine 50 mg 3x a day, Ramipril 5 mg once daily, furosemide 20 mg once daily, and Pravastatin 40 mg once daily for cholesterol. \par \par 05/18/2021: Patient presents today for a follow up accompanied by his wife. The pt had been experiencing urinary frequency, dysuria, and burning last week. He went to the emergency room on 5/13/2021 and a Ramírez catheter was placed. He currently has the catheter in and feels better. He was also given cephalexin 500 mg BID as an antibiotic. This medication has not affected his GI system. The patient has been taking tamsulosin 0.4 mg BID (increased from once daily) as well as finasteride 5 mg once daily. \par \par 05/24/2021: Patient presents today for fill and pull voiding trial for urinary retention. Continues Tadalafil 5mg, Finasteride, and Tamsulosin BID. Has had UDS three years ago without issue. \par \par 06/16/2021: Patient presents today for urodynamics with cystoscopy. He tolerated the procedure well. There was some hematuria when the catheter was passed over the prostate. Patient continues to take doxazosin 8 mg once daily at bedtime, finasteride 5 mg once daily, tadalafil 5 mg once daily, and tamsulosin 0.4 mg BID after a meal for his prostate. He also takes Januvia for diabetes, baby aspirin, ramipril for BP, hydralazine for BP, folic acid, furosemide 20 mg once daily, METformin 1000 mg BID for diabetes and Pravastatin 40 mg for cholesterol. Last PSA from 12/30/2021 was 5.03. MRI of the prostate was satisfactory in December 2019 with a PIRADS-2 and revealed his prostate is 88 cc. PSA has been stable since then. \par \par 07/28/2021: The patient presents today for a post-operative visit. Patient underwent transurethral holmium laser enucleation of the prostate with morcellation with Dr. Viktoria Jernigan on 7/20/2021. The procedure went very well and the patient currently has no pain. He no longer has a catheter. His wife reports that his urine still has a slightly pink tint. He has some post void leaking since surgery but his stream is stronger. Denies constipation and his appetite remains good. The patient has not been sexually active the past 3 months.

## 2024-08-23 NOTE — PROGRESS NOTES
"Dory Xie presented for an initial Medicare AWV today. The following components were reviewed and updated:    Medical history  Family History  Social history  Allergies and Current Medications  Health Risk Assessment  Health Maintenance  Care Team    **See Completed Assessments for Annual Wellness visit with in the encounter summary    The following assessments were completed:  Depression Screening  Cognitive function Screening  Timed Get Up Test  Whisper Test      Opioid documentation:      Patient does not have a current opioid prescription.          Vitals:    08/23/24 1101 08/23/24 1155 08/23/24 1201 08/23/24 1209   BP: (!) 93/54 128/68 104/74 (!) 81/62   BP Location:  Right arm Left arm Left arm   Patient Position:  Sitting Sitting Standing   Pulse: 67 63 71 79   Temp: 97.9 °F (36.6 °C)      TempSrc: Temporal      SpO2: 98%      Weight: 88.5 kg (195 lb)      Height: 5' 3" (1.6 m)        Body mass index is 34.54 kg/m².       Physical Exam  HENT:      Mouth/Throat:      Mouth: Mucous membranes are moist.   Cardiovascular:      Rate and Rhythm: Normal rate and regular rhythm.      Pulses: Normal pulses.      Heart sounds: Normal heart sounds.   Pulmonary:      Effort: Pulmonary effort is normal.      Breath sounds: Normal breath sounds.   Skin:     General: Skin is warm and dry.      Comments: Right chest wall mediport in place no issues   Neurological:      General: No focal deficit present.      Mental Status: She is alert and oriented to person, place, and time.      Gait: Gait abnormal (occasional loss of balance).   Psychiatric:         Mood and Affect: Mood normal.         Behavior: Behavior normal.           Diagnoses and health risks identified today and associated recommendations/orders:  1. Encounter for preventive health examination  Medicare awv complete. Health maintenance:  tetanus, rsv, and shingles vaccines due-encouraged pt to obtain at a pharmacy.  Pt declined further covid 19 vaccines. "     2. Diffuse large B-cell lymphoma, unspecified body region  Per oncology July 30 2024 appointment at [facility] -intermediate risk according to IPI score. Completed chemo and radiation. Follow-up -in 6 to 8 weeks for clinical reevaluation, sooner if necessary. PET CT scan ordered to be completed prior to future return appointment for post radiation therapy restaging. Right chest wall mediport in place-no issues. F/u with Dr pang and jazlyn blount np.     3. Chronic diastolic (congestive) heart failure  Echocardiogram completed 2/23/2023 demonstrating normal left ventricle in size with concentric remodeling and normal systolic function. Estimated ejection fraction is 65%. Normal left ventricular diastolic function. Normal right ventricular size with normal right ventricular systolic function. On lipitor.  Continue current. F/u with cardiology.     4. Simple chronic bronchitis  Dx by Dr. Baca 2/24/23. Pt does c/o sob on exertion which is not new and not worsened. Does not use inhalers. O2 sat normal. F/u with pcp    5. Major depressive disorder, single episode, moderate  Chronic and stable. Continue current management. On fluoxetine. See med list. Pt denies any SI. Follow up with PCP.     6. Nodule of right lobe of thyroid gland  status post biopsy by FNA on 1/10/2024 by ENT with pathologic findings consistent with colloid nodule. F/u with oncology.     7. Primary osteoarthritis of both knees  Chronic and stable. Continue current management. Meloxicam. See med list. Follow up with PCP.     8. Osteopenia, unspecified location  Dexa feb 2022. Next dexa due 2025. Chronic. Recommend vitamin d, calcium in the diet, and weight bearing exercise. Follow up with PCP.       9. Seasonal allergies  Chronic and stable. Continue current management. Azelastine nasal spray. See med list. Follow up with PCP.     10. Pure hypercholesterolemia  Lab Results   Component Value Date    CHOL 161 03/18/2024    CHOL 184 02/17/2022    CHOL  175 09/10/2018     Lab Results   Component Value Date    HDL 36 (L) 03/18/2024    HDL 32 (L) 02/17/2022    HDL 29 (L) 09/10/2018     Lab Results   Component Value Date    LDLCALC 83.0 03/18/2024    LDLCALC 124.6 02/17/2022    LDLCALC 117.2 09/10/2018     Lab Results   Component Value Date    TRIG 210 (H) 03/18/2024    TRIG 137 02/17/2022    TRIG 144 09/10/2018       Lab Results   Component Value Date    CHOLHDL 22.4 03/18/2024    CHOLHDL 17.4 (L) 02/17/2022    CHOLHDL 16.6 (L) 09/10/2018   Chronic. On lipitor. Ldl at goal. Trig 210. Start fish oil and eat lowfat diet and exercise. Discussed with patient. F/u with pcp.     11. Orthostatic hypotension  New. Patient c/o lightheadedness every day.  She had to hold onto the wall to not fall there.  She does have a Rollator that she uses occasionally.  Blood pressure sitting 104/74 heart rate 71.  Blood pressure standing dropped to 81/62 heart rate 79.  I recommended patient drink more fluids, wear compression stockings, check blood pressures daily,  and see her cardiologist Dr. Urban seals and  follow-up with PCP. This message was sent to Dr. Davey.     12. Abnormality of gait and mobility  Chronic. Fall precautions recommended and discussed. Follow up with PCP.      13. Obesity (BMI 30.0-34.9)  Recommend diet and exercise to lose weight. Follow up with your PCP as planned to discuss adjustments to your treatment plan.        Collection Time: 02/07/24 10:08 AM  White Blood Cell Count 5.1    normal 4.0 - 11.0 1000/uL  Platelet Count 210     normal 150 - 375 K/uL  No neutropenia or thrombocytopenia.         Provided Dory with a 5-10 year written screening schedule and personal prevention plan. Recommendations were developed using the USPSTF age appropriate recommendations. Education, counseling, and referrals were provided as needed.  After Visit Summary printed and given to patient which includes a list of additional screenings\tests needed.    Follow up in about 1  year (around 8/23/2025) for annual wellness visit.      Iliana Baptiste, LLOYDP      I offered to discuss advanced care planning, including how to pick a person who would make decisions for you if you were unable to make them for yourself, called a health care power of , and what kind of decisions you might make such as use of life sustaining treatments such as ventilators and tube feeding when faced with a life limiting illness recorded on a living will that they will need to know. (How you want to be cared for as you near the end of your natural life)     X Patient is interested in learning more about how to make advanced directives.  I provided them paperwork and offered to discuss this with them.

## 2024-08-23 NOTE — PATIENT INSTRUCTIONS
Counseling and Referral of Other Preventative  (Italic type indicates deductible and co-insurance are waived)    Patient Name: Dory Xie  Today's Date: 8/23/2024    Health Maintenance       Date Due Completion Date    TETANUS VACCINE Never done ---    RSV Vaccine (Age 60+ and Pregnant patients) (1 - 1-dose 60+ series) Never done ---    Shingles Vaccine (1 of 2) 09/12/2012 7/18/2012    COVID-19 Vaccine (5 - 2023-24 season) 08/23/2025 (Originally 9/1/2023) 2/26/2021    Influenza Vaccine (1) 09/01/2024 11/27/2023    DEXA Scan 02/17/2025 2/17/2022    Override on 8/8/2011: Done    Mammogram 02/21/2025 2/21/2024    Override on 8/8/2011: Done    Override on 6/8/2011: Done    Lipid Panel 03/18/2025 3/18/2024    Colorectal Cancer Screening 09/21/2026 9/21/2016        No orders of the defined types were placed in this encounter.    The following information is provided to all patients.  This information is to help you find resources for any of the problems found today that may be affecting your health:                  Living healthy guide: www.[facility].gov      Understanding Diabetes: www.diabetes.org      Eating healthy: www.cdc.gov/healthyweight      [facility] home safety checklist: www.cdc.gov/steadi/patient.html      Agency on Aging: www.goea.[place].[facility]      Alcoholics anonymous (AA): www.aa.org      Physical Activity: www.tacho.nih.gov/vg2kstv      Tobacco use: www.quitwithusla.org

## 2024-08-24 ENCOUNTER — PATIENT MESSAGE (OUTPATIENT)
Dept: INTERNAL MEDICINE | Facility: CLINIC | Age: 74
End: 2024-08-24
Payer: MEDICARE

## 2024-08-26 ENCOUNTER — PATIENT MESSAGE (OUTPATIENT)
Dept: INTERNAL MEDICINE | Facility: CLINIC | Age: 74
End: 2024-08-26
Payer: MEDICARE

## 2024-08-26 ENCOUNTER — TELEPHONE (OUTPATIENT)
Dept: INTERNAL MEDICINE | Facility: CLINIC | Age: 74
End: 2024-08-26
Payer: MEDICARE

## 2024-08-26 NOTE — TELEPHONE ENCOUNTER
----- Message from Makenna Davey MD sent at 8/25/2024  2:13 PM CDT -----  Agree. She is not on any BP meds, but reports orthstatic hypotension with sx.  Have her see Dr. Duggan for eval and possible tilt testing and echol  ----- Message -----  From: Iliana Baptiste FNP  Sent: 8/23/2024   3:00 PM CDT  To: Makenna Davey MD    Medicare awv complete. Health maintenance:  tetanus, rsv, and shingles vaccines due-encouraged pt to obtain at a pharmacy.  Pt declined further covid 19 vaccines.     Orthostatic hypotension  New. Patient c/o lightheadedness every day.  She had to hold onto the wall to not fall there.  She does have a Rollator that she uses occasionally.  Blood pressure sitting 104/74 heart rate 71.  Blood pressure standing dropped to 81/62 heart rate 79.  I recommended patient drink more fluids, wear compression stockings, check blood pressures daily,  and see her cardiologist Dr. Duggan asap and  follow-up with PCP.

## 2024-09-20 ENCOUNTER — PATIENT MESSAGE (OUTPATIENT)
Dept: INTERNAL MEDICINE | Facility: CLINIC | Age: 74
End: 2024-09-20
Payer: MEDICARE

## 2024-09-25 ENCOUNTER — PATIENT MESSAGE (OUTPATIENT)
Dept: INTERNAL MEDICINE | Facility: CLINIC | Age: 74
End: 2024-09-25
Payer: MEDICARE

## 2024-10-23 ENCOUNTER — PATIENT MESSAGE (OUTPATIENT)
Dept: INTERNAL MEDICINE | Facility: CLINIC | Age: 74
End: 2024-10-23
Payer: MEDICARE

## 2024-10-23 DIAGNOSIS — M25.511 BILATERAL SHOULDER PAIN, UNSPECIFIED CHRONICITY: ICD-10-CM

## 2024-10-23 DIAGNOSIS — M15.9 OSTEOARTHRITIS OF MULTIPLE JOINTS, UNSPECIFIED OSTEOARTHRITIS TYPE: Primary | ICD-10-CM

## 2024-10-23 DIAGNOSIS — M25.512 BILATERAL SHOULDER PAIN, UNSPECIFIED CHRONICITY: ICD-10-CM

## 2024-10-24 ENCOUNTER — TELEPHONE (OUTPATIENT)
Dept: INTERNAL MEDICINE | Facility: CLINIC | Age: 74
End: 2024-10-24
Payer: MEDICARE

## 2024-10-24 NOTE — TELEPHONE ENCOUNTER
"PT referral faxed to [facility]    10/24/2024 11:43:00 AM Transmission Record          Sent to [phone] with remote ID "[id number]"          Result: (0/339;0/0) Success          Page record: 1 - 4          Elapsed time: 01:35 on channel 54    "

## 2024-10-29 ENCOUNTER — HOSPITAL ENCOUNTER (OUTPATIENT)
Dept: RADIOLOGY | Facility: HOSPITAL | Age: 74
Discharge: HOME | End: 2024-10-29
Payer: MEDICARE

## 2024-10-29 ENCOUNTER — OFFICE VISIT (OUTPATIENT)
Dept: INTERNAL MEDICINE | Facility: CLINIC | Age: 74
End: 2024-10-29
Payer: MEDICARE

## 2024-10-29 VITALS
HEART RATE: 98 BPM | DIASTOLIC BLOOD PRESSURE: 62 MMHG | WEIGHT: 205 LBS | RESPIRATION RATE: 20 BRPM | OXYGEN SATURATION: 98 % | BODY MASS INDEX: 36.32 KG/M2 | TEMPERATURE: 97 F | SYSTOLIC BLOOD PRESSURE: 126 MMHG

## 2024-10-29 DIAGNOSIS — R05.9 COUGH, UNSPECIFIED TYPE: ICD-10-CM

## 2024-10-29 DIAGNOSIS — T45.1X5A CHEMOTHERAPY-INDUCED NEUROPATHY: ICD-10-CM

## 2024-10-29 DIAGNOSIS — G89.29 CHRONIC LEFT SHOULDER PAIN: ICD-10-CM

## 2024-10-29 DIAGNOSIS — E66.01 SEVERE OBESITY (BMI 35.0-39.9) WITH COMORBIDITY: ICD-10-CM

## 2024-10-29 DIAGNOSIS — M25.512 CHRONIC LEFT SHOULDER PAIN: ICD-10-CM

## 2024-10-29 DIAGNOSIS — J41.0 SIMPLE CHRONIC BRONCHITIS: Primary | ICD-10-CM

## 2024-10-29 DIAGNOSIS — C83.30 DIFFUSE LARGE B-CELL LYMPHOMA, UNSPECIFIED BODY REGION: ICD-10-CM

## 2024-10-29 DIAGNOSIS — G62.0 CHEMOTHERAPY-INDUCED NEUROPATHY: ICD-10-CM

## 2024-10-29 PROCEDURE — 71046 X-RAY EXAM CHEST 2 VIEWS: CPT | Mod: TC

## 2024-10-29 PROCEDURE — 3078F DIAST BP <80 MM HG: CPT | Mod: CPTII,S$GLB,,

## 2024-10-29 PROCEDURE — 3288F FALL RISK ASSESSMENT DOCD: CPT | Mod: CPTII,S$GLB,,

## 2024-10-29 PROCEDURE — 1101F PT FALLS ASSESS-DOCD LE1/YR: CPT | Mod: CPTII,S$GLB,,

## 2024-10-29 PROCEDURE — 99999 PR PBB SHADOW E&M-EST. PATIENT-LVL V: CPT | Mod: PBBFAC,,,

## 2024-10-29 PROCEDURE — 1160F RVW MEDS BY RX/DR IN RCRD: CPT | Mod: CPTII,S$GLB,,

## 2024-10-29 PROCEDURE — 3074F SYST BP LT 130 MM HG: CPT | Mod: CPTII,S$GLB,,

## 2024-10-29 PROCEDURE — 73030 X-RAY EXAM OF SHOULDER: CPT | Mod: TC,LT

## 2024-10-29 PROCEDURE — 71046 X-RAY EXAM CHEST 2 VIEWS: CPT | Mod: 26,,, | Performed by: STUDENT IN AN ORGANIZED HEALTH CARE EDUCATION/TRAINING PROGRAM

## 2024-10-29 PROCEDURE — 1159F MED LIST DOCD IN RCRD: CPT | Mod: CPTII,S$GLB,,

## 2024-10-29 PROCEDURE — G2211 COMPLEX E/M VISIT ADD ON: HCPCS | Mod: S$GLB,,,

## 2024-10-29 PROCEDURE — 99214 OFFICE O/P EST MOD 30 MIN: CPT | Mod: S$GLB,,,

## 2024-10-29 RX ORDER — METHYLPREDNISOLONE 4 MG/1
TABLET ORAL
Qty: 21 EACH | Refills: 0 | Status: SHIPPED | OUTPATIENT
Start: 2024-10-29 | End: 2024-11-19

## 2024-10-29 RX ORDER — DOXYCYCLINE 100 MG/1
100 CAPSULE ORAL EVERY 12 HOURS
Qty: 14 CAPSULE | Refills: 0 | Status: SHIPPED | OUTPATIENT
Start: 2024-10-29

## 2024-10-29 RX ORDER — ALBUTEROL SULFATE 90 UG/1
2 INHALANT RESPIRATORY (INHALATION) EVERY 6 HOURS PRN
Qty: 54 G | Refills: 1 | Status: SHIPPED | OUTPATIENT
Start: 2024-10-29

## 2024-10-30 DIAGNOSIS — M25.511 BILATERAL SHOULDER PAIN, UNSPECIFIED CHRONICITY: Primary | ICD-10-CM

## 2024-10-30 DIAGNOSIS — M25.512 BILATERAL SHOULDER PAIN, UNSPECIFIED CHRONICITY: Primary | ICD-10-CM

## 2024-10-31 ENCOUNTER — TELEPHONE (OUTPATIENT)
Dept: INTERNAL MEDICINE | Facility: CLINIC | Age: 74
End: 2024-10-31
Payer: MEDICARE

## 2024-11-14 ENCOUNTER — OFFICE VISIT (OUTPATIENT)
Dept: INTERNAL MEDICINE | Facility: CLINIC | Age: 74
End: 2024-11-14
Payer: MEDICARE

## 2024-11-14 DIAGNOSIS — Z12.31 SCREENING MAMMOGRAM, ENCOUNTER FOR: ICD-10-CM

## 2024-11-14 DIAGNOSIS — Z13.6 ENCOUNTER FOR LIPID SCREENING FOR CARDIOVASCULAR DISEASE: ICD-10-CM

## 2024-11-14 DIAGNOSIS — C83.30 DIFFUSE LARGE B-CELL LYMPHOMA, UNSPECIFIED BODY REGION: ICD-10-CM

## 2024-11-14 DIAGNOSIS — Z78.0 POSTMENOPAUSAL: ICD-10-CM

## 2024-11-14 DIAGNOSIS — E04.1 NODULE OF RIGHT LOBE OF THYROID GLAND: ICD-10-CM

## 2024-11-14 DIAGNOSIS — G89.29 OTHER CHRONIC PAIN: Primary | ICD-10-CM

## 2024-11-14 DIAGNOSIS — M19.90 OSTEOARTHRITIS, UNSPECIFIED OSTEOARTHRITIS TYPE, UNSPECIFIED SITE: ICD-10-CM

## 2024-11-14 DIAGNOSIS — Z12.31 ENCOUNTER FOR SCREENING MAMMOGRAM FOR MALIGNANT NEOPLASM OF BREAST: ICD-10-CM

## 2024-11-14 DIAGNOSIS — E78.00 PURE HYPERCHOLESTEROLEMIA: ICD-10-CM

## 2024-11-14 DIAGNOSIS — Z13.220 ENCOUNTER FOR LIPID SCREENING FOR CARDIOVASCULAR DISEASE: ICD-10-CM

## 2024-11-14 PROCEDURE — 1159F MED LIST DOCD IN RCRD: CPT | Mod: CPTII,95,, | Performed by: FAMILY MEDICINE

## 2024-11-14 PROCEDURE — 1160F RVW MEDS BY RX/DR IN RCRD: CPT | Mod: CPTII,95,, | Performed by: FAMILY MEDICINE

## 2024-11-14 PROCEDURE — 99214 OFFICE O/P EST MOD 30 MIN: CPT | Mod: 95,,, | Performed by: FAMILY MEDICINE

## 2024-11-14 RX ORDER — ONDANSETRON HYDROCHLORIDE 8 MG/1
TABLET, FILM COATED ORAL
COMMUNITY

## 2024-11-14 NOTE — PROGRESS NOTES
Dory Xie  11/14/2024  [id number]    Makenna Davey MD  Patient Care Team:  Makenna Davey MD as PCP - General (Family Medicine)  Kely Salazar MD as Consulting Physician (Physical Medicine and Rehabilitation)  Iliana Benoit LPN as Care Coordinator (Internal Medicine)  Sorin Mcmanus MD as Consulting Physician (Ophthalmology)  Antoine Duggan MD as Consulting Physician (Internal Medicine)      The patient location is: home  The chief complaint leading to consultation is: F/u    Visit type: audiovisual    Face to Face time with patient: 12:40  20 minutes of total time spent on the encounter, which includes face to face time and non-face to face time preparing to see the patient (eg, review of tests), Obtaining and/or reviewing separately obtained history, Documenting clinical information in the electronic or other health record, Independently interpreting results (not separately reported) and communicating results to the patient/family/caregiver, or Care coordination (not separately reported).         Each patient to whom he or she provides medical services by telemedicine is:  (1) informed of the relationship between the physician and patient and the respective role of any other health care provider with respect to management of the patient; and (2) notified that he or she may decline to receive medical services by telemedicine and may withdraw from such care at any time.    Notes:      Visit Type:a scheduled routine follow-up visit    Chief Complaint:Follow up Cough  History of Present Illness:    History of Present Illness    CHIEF COMPLAINT:  Ms. Xie presents for a follow-up visit to address persistent cough following chemotherapy and radiation for lymphoma, as well as worsening neuropathy symptoms. COugh has improved with use of Doxy and Steroids.  She is in PT and they are doing dry needling. Shoulder still hurts, but okay.    HPI:  Ms. Xie reports a persistent cough  following chemotherapy and radiation for lymphoma. The cough varies in characteristics, sometimes producing clear secretions and sometimes thick sputum, with associated wheezing that worsens at night. She has tried Mucinex, cough syrup, and Claritin nasal spray to manage her symptoms. Weather changes exacerbate her cough due to allergies.    Ms. Xie describes worsening neuropathy, which she attributes to her chemo and radiation treatment. She reports new onset of numbness affecting her legs and arms, with a tingling sensation from her foot to her entire leg.    She has been having shoulder pain, previously addressed with steroids. Physical therapy and dry needling have been beneficial, noting improvement after these treatments, although it remains somewhat uncomfortable. Both shoulders are affected, but the left one is more problematic.    Ms. Xie saw her hematologist/oncologist, Dr. Cooper, at [facility]. Dr. Cooper noted that she continued to have some generalized arthritis secondary to suspected arthritis. Her weight was reported as stable.    Ms. Xie takes Prozac 40 mg for a history of depression, gabapentin 300 mg 3 times daily for associated arthritis and neuropathy, and Mobic 15 mg. She was previously given steroids to help with her shoulder pain, chronic cough, and bronchitis. She was also prescribed doxycycline, which she has completed. The combination of steroids and antibiotics was effective, and she has been taking Claritin since finishing the antibiotic course.    MEDICATIONS:  Ms. Xie is on Prozac 40 mg daily for her history of depression. She is also taking Gabapentin 300 mg 3 times daily for associated arthritis and neuropathy, and Mobic 15 mg daily. For allergies, she is on daily Claritin and Claritin nasal spray.    MEDICAL HISTORY:  Ms. Xie has a history of lymphoma, depression, and generalized arthritis. She was diagnosed with a right thyroid colloid nodule in  January 2024.    TEST RESULTS:  Recently, the patient's hemoglobin and hematocrit have recovered to normal range. Her white blood cell count is slightly low, which is consistent with her previous history of radiation and chemotherapy. In January 2024, the patient underwent a Fine Needle Aspiration (FNA) of her right thyroid nodule, which confirmed a colloid nodule.    IMAGING:  A chest XR on October 29th showed no acute pulmonary process. On the same day, an X-ray of the left shoulder revealed some arthritis and sclerosis of the glenohumeral joint. A PET scan on September 23, 2024, demonstrated a favorable response with interval resolution of hypermetabolic left level 3 and left external iliac lymph nodes, along with stable low hypermetabolism of 2 cervical lymph nodes.        Answers submitted by the patient for this visit:  Review of Systems Questionnaire (Submitted on 11/7/2024)  activity change: Yes  unexpected weight change: No  rhinorrhea: Yes  trouble swallowing: Yes  visual disturbance: No  chest tightness: No  polyuria: No  difficulty urinating: No  menstrual problem: No  joint swelling: No  arthralgias: No  confusion: No  dysphoric mood: No      The following were reviewed at this visit: active problem list, medication list, allergies, family history, social history, and health maintenance.  History:  Past Medical History:   Diagnosis Date    Arthritis     Cancer     cervical    Chronic diastolic (congestive) heart failure 02/10/2023    Chronic pain     From osteoarthritis    Colitis     Diffuse large B cell lymphoma     GERD (gastroesophageal reflux disease)     Nodule of right lobe of thyroid gland 08/22/2024    Nontoxic single thyroid nodule     Seasonal allergies      Past Surgical History:   Procedure Laterality Date    BRONCHOSCOPY N/A 3/16/2023    Procedure: BRONCHOSCOPY;  Surgeon: Agatha Rhoades MD;  Location: [facility];  Service: Pulmonary;  Laterality: N/A;    COLONOSCOPY N/A 09/21/2016     Procedure: COLONOSCOPY;  Surgeon: Hector Summers III, MD;  Location: [facility];  Service: Endoscopy;  Laterality: N/A;    ENDOBRONCHIAL ULTRASOUND Bilateral 3/16/2023    Procedure: ENDOBRONCHIAL ULTRASOUND (EBUS);  Surgeon: Agatha Rhoades MD;  Location: [facility];  Service: Pulmonary;  Laterality: Bilateral;    EYE SURGERY      HYSTERECTOMY      29yrs old    JOINT REPLACEMENT  Both knees    KNEE SURGERY Left     KNEE SURGERY Right     THORACENTESIS Left 3/28/2023    Procedure: Thoracentesis;  Surgeon: Wyatt Baca MD;  Location: [facility];  Service: Pulmonary;  Laterality: Left;    TONSILLECTOMY      TOTAL KNEE ARTHROPLASTY Bilateral 2019    TUBAL LIGATION       Patient Active Problem List   Diagnosis    Primary osteoarthritis of both knees    Chronic pain    Hyperlipidemia    Seasonal allergies    Osteopenia    Family history of arteriosclerotic cardiovascular disease    Vertigo    Major depressive disorder, single episode, moderate    Mediastinal lymphadenopathy    Chronic diastolic (congestive) heart failure    Mass of upper lobe of left lung    Abnormal PET scan of lung    Diffuse large B cell lymphoma    Simple chronic bronchitis    Nodule of right lobe of thyroid gland    Orthostatic hypotension    Obesity (BMI 30.0-34.9)    Osteoarthritis       Medications:  Current Outpatient Medications on File Prior to Visit   Medication Sig Dispense Refill    albuterol (VENTOLIN HFA) 90 mcg/actuation inhaler Inhale 2 puffs into the lungs every 6 (six) hours as needed for Wheezing or Shortness of Breath. Rescue 54 g 1    atorvastatin (LIPITOR) 20 MG tablet Take 20 mg by mouth.      azelastine (ASTELIN) 137 mcg (0.1 %) nasal spray 1 spray by Nasal route 2 (two) times daily.      doxycycline (VIBRAMYCIN) 100 MG Cap Take 1 capsule (100 mg total) by mouth every 12 (twelve) hours. 14 capsule 0    FLUoxetine 40 MG capsule Take 1 capsule (40 mg total) by mouth once daily. 30 capsule 11    gabapentin (NEURONTIN) 300 MG  capsule Take 1 capsule (300 mg total) by mouth 3 (three) times daily. 90 capsule 11    LIDOcaine-prilocaine (EMLA) cream APPLY TOPICALLY ONCE FOR ONE (1) DOSE. APPLY 30 MINUTES PRIOR TO MEDIPORT USE.      loratadine (CLARITIN) 10 mg tablet Take 1 tablet (10 mg total) by mouth once daily. 30 tablet 11    meloxicam (MOBIC) 15 MG tablet TAKE ONE (1) TABLET (15 MG TOTAL) BY MOUTH ONCE DAILY. 90 tablet 1    methylPREDNISolone (MEDROL DOSEPACK) 4 mg tablet use as directed 21 each 0    ondansetron (ZOFRAN) 8 MG tablet TAKE ONE (1) TABLET BY MOUTH EVERY 8 (EIGHT) HOURS AS NEEDED FOR NAUSEA.      ondansetron (ZOFRAN-ODT) 8 MG TbDL Take 8 mg by mouth.      pantoprazole (PROTONIX) 20 MG tablet Take 1 tablet (20 mg total) by mouth once daily. 90 tablet 3     No current facility-administered medications on file prior to visit.       Medications have been reviewed and reconciled with patient at this visit.    Exam:  Wt Readings from Last 3 Encounters:   10/29/24 93 kg (205 lb 0.4 oz)   08/23/24 88.5 kg (195 lb)   12/20/23 104.1 kg (229 lb 8 oz)     Temp Readings from Last 3 Encounters:   10/29/24 97.3 °F (36.3 °C) (Tympanic)   08/23/24 97.9 °F (36.6 °C) (Temporal)   12/20/23 97.3 °F (36.3 °C) (Tympanic)     BP Readings from Last 3 Encounters:   10/29/24 126/62   08/23/24 (!) 81/62   12/20/23 106/72     Pulse Readings from Last 3 Encounters:   10/29/24 98   08/23/24 79   12/20/23 65     There is no height or weight on file to calculate BMI.      Review of Systems   HENT:  Negative for hearing loss.    Eyes:  Negative for discharge.   Respiratory:  Positive for wheezing.    Cardiovascular:  Negative for chest pain and palpitations.   Gastrointestinal:  Positive for constipation, diarrhea and vomiting. Negative for blood in stool.   Genitourinary:  Negative for dysuria and hematuria.   Musculoskeletal:  Negative for neck pain.   Neurological:  Negative for weakness and headaches.   Endo/Heme/Allergies:  Negative for polydipsia.      Physical Exam  Nursing note reviewed.   Pulmonary:      Effort: Pulmonary effort is normal. No respiratory distress.   Neurological:      Mental Status: She is alert and oriented to person, place, and time.   Psychiatric:         Mood and Affect: Mood normal.         Behavior: Behavior normal.         Thought Content: Thought content normal.         Judgment: Judgment normal.       Physical Exam    Lungs: Wheezing.        Laboratory Reviewed ({Yes)  Lab Results   Component Value Date    WBC 6.96 01/18/2023    HGB 9.6 (L) 01/18/2023    HCT 33.2 (L) 01/18/2023     01/18/2023    CHOL 161 03/18/2024    TRIG 210 (H) 03/18/2024    HDL 36 (L) 03/18/2024    ALT 7 (L) 02/15/2023    AST 13 02/15/2023     02/15/2023    K 4.0 02/15/2023     02/15/2023    CREATININE 1.1 02/15/2023    BUN 12 02/15/2023    CO2 25 02/15/2023    TSH 1.758 09/15/2017    INR 1.2 04/10/2023    HGBA1C 5.4 02/19/2014       Diagnoses and all orders for this visit:    Other chronic pain    Diffuse large B-cell lymphoma, unspecified body region    Nodule of right lobe of thyroid gland    Pure hypercholesterolemia    Osteoarthritis, unspecified osteoarthritis type, unspecified site    Screening mammogram, encounter for  -     DXA Bone Density Axial Skeleton 1 or more sites; Future    Postmenopausal  -     Mammo Digital Screening Bilat w/ Isaiah; Future    Encounter for lipid screening for cardiovascular disease  -     Lipid Panel; Future    Encounter for screening mammogram for malignant neoplasm of breast  -     Mammo Digital Screening Bilat w/ Isaiah; Future          Assessment & Plan    C85.90 Non-Hodgkin lymphoma, unspecified, unspecified site    Z92.21 Personal history of antineoplastic chemotherapy    Z92.3 Personal history of irradiation    G62.0 Drug-induced polyneuropathy    R20.2 Paresthesia of skin    M15.0 Primary generalized (osteo)arthritis    F33.9 Major depressive disorder, recurrent, unspecified    E04.2 Nontoxic  multinodular goiter    J42 Unspecified chronic bronchitis    M25.512 Pain in left shoulder    Assessed persistent cough following chemotherapy and radiation for lymphoma    Reviewed recent PET scan showing favorable response with resolution of hypermetabolic lymph nodes    Evaluated shoulder x-ray indicating moderate hypertrophy in AC joint and joint space narrowing in glenohumeral joint, consistent with bony arthritis    Considered treatment options for shoulder pain, including physical therapy, dry needling, and potential steroid injection    Reviewed recent lab results, noting recovery of hemoglobin and hematocrit to normal range    Monitored slightly low white blood cell count, consistent with history of radiation and chemotherapy    SHOULDER ARTHRITIS:  Explained x-ray findings of moderate hypertrophy in AC joint and joint space narrowing in glenohumeral joint.  Discussed that total shoulder replacements are typically reserved for traumatic injuries or tears, rather than arthritis. Offered Ortho for injection. Opted to stay in therapy first, then return if not improving.     KIDNEY FUNCTION:  Ms. Xie to continue drinking water to maintain kidney function.    ALLERGIES:  Continued Claritin for allergy symptoms.    ROUTINE HEALTH MONITORING:  Ordered CBC, CMP, cholesterol, and other routine labs prior to February appointment.    FOLLOW-UP:  Follow up at the end of February for in-office visit, coming fasting in the morning.  Follow up in February for bone density scan, cholesterol check, mammogram, and review of next PET scan results.            Care Plan/Goals: Reviewed     Follow up: Follow up in about 3 months (around 2/14/2025).    After visit summary was printed and given to patient upon discharge today.  Patient goals and care plan are included in After Visit Summary.

## 2024-12-09 ENCOUNTER — PATIENT MESSAGE (OUTPATIENT)
Dept: INTERNAL MEDICINE | Facility: CLINIC | Age: 74
End: 2024-12-09
Payer: MEDICARE

## 2024-12-11 ENCOUNTER — TELEPHONE (OUTPATIENT)
Dept: PULMONOLOGY | Facility: CLINIC | Age: 74
End: 2024-12-11
Payer: MEDICARE

## 2024-12-11 NOTE — DISCHARGE SUMMARY
Airway  Date/Time: 12/11/2024 2:28 PM  Urgency: elective      Staffing  Performed: CRNA   Authorized by: Wally Rose MD    Performed by: CROW Cisneros-LUL  Patient location during procedure: OR    Indications and Patient Condition  Indications for airway management: anesthesia  Spontaneous Ventilation: absent  Sedation level: deep  Preoxygenated: yes  Patient position: sniffing  Mask difficulty assessment: 1 - vent by mask    Final Airway Details  Final airway type: endotracheal airway      Successful airway: ETT  Cuffed: yes   Successful intubation technique: video laryngoscopy  Facilitating devices/methods: intubating stylet  Endotracheal tube insertion site: oral  Blade: Stephen  Blade size: #4  ETT size (mm): 7.5  Cormack-Lehane Classification: grade I - full view of glottis  Placement verified by: chest auscultation and capnometry   Measured from: lips  ETT to lips (cm): 22  Number of attempts at approach: 1  Ventilation between attempts: none  Number of other approaches attempted: 0    Additional Comments  Elective Ryan mac 4 utilized without difficulty         [facility] ([facility])  Discharge Note  Short Stay    Procedure(s) (LRB):  Thoracentesis (Left)      OUTCOME: Patient tolerated treatment/procedure well without complication and is now ready for discharge.    DISPOSITION: Home or Self Care    FINAL DIAGNOSIS:  Left pleural effusion     FOLLOWUP: In clinic    DISCHARGE INSTRUCTIONS:  No discharge procedures on file.      Clinical Reference Documents Added to Patient Instructions         Document    THORACENTESIS (ENGLISH)            TIME SPENT ON DISCHARGE: 15 minutes

## 2024-12-11 NOTE — TELEPHONE ENCOUNTER
Called pt to reschedule pulm appts, due to provider no longer being in office first two weeks out of the month, appt re/s, pt verbalized understanding

## 2024-12-27 ENCOUNTER — PATIENT OUTREACH (OUTPATIENT)
Dept: ADMINISTRATIVE | Facility: HOSPITAL | Age: 74
End: 2024-12-27
Payer: MEDICARE

## 2024-12-27 ENCOUNTER — PATIENT MESSAGE (OUTPATIENT)
Dept: ADMINISTRATIVE | Facility: HOSPITAL | Age: 74
End: 2024-12-27
Payer: MEDICARE

## 2024-12-27 NOTE — PROGRESS NOTES
Replying to Campaign Questionnaire for Overdue HM: Mammogram & Dexa Scan    Scheduled appointment 2/25/2025  Portal message sent to patient.

## 2025-01-03 DIAGNOSIS — G62.9 NEUROPATHY: ICD-10-CM

## 2025-01-03 NOTE — TELEPHONE ENCOUNTER
No care due was identified.  [facility] Embedded Care Due Messages. Reference number: [id number].   1/03/2025 3:28:52 PM CST

## 2025-01-04 DIAGNOSIS — F32.1 MAJOR DEPRESSIVE DISORDER, SINGLE EPISODE, MODERATE: ICD-10-CM

## 2025-01-04 NOTE — TELEPHONE ENCOUNTER
No care due was identified.  [facility] Embedded Care Due Messages. Reference number: [id number].   1/04/2025 11:09:41 AM CST

## 2025-01-05 RX ORDER — GABAPENTIN 300 MG/1
300 CAPSULE ORAL 3 TIMES DAILY
Qty: 90 CAPSULE | Refills: 11 | Status: SHIPPED | OUTPATIENT
Start: 2025-01-05 | End: 2026-01-05

## 2025-01-05 RX ORDER — FLUOXETINE HYDROCHLORIDE 40 MG/1
40 CAPSULE ORAL DAILY
Qty: 90 CAPSULE | Refills: 3 | Status: SHIPPED | OUTPATIENT
Start: 2025-01-05

## 2025-01-05 NOTE — TELEPHONE ENCOUNTER
Refill Routing Note   Medication(s) are not appropriate for processing by [facility] for the following reason(s):        Outside of protocol    [facility] action(s):  Route               Appointments  past 12m or future 3m with PCP    Date Provider   Last Visit   11/14/2024 Makenna Davey MD   Next Visit   1/4/2025 Makenna Davey MD   ED visits in past 90 days: 0        Note composed:10:08 PM 01/04/2025

## 2025-01-06 ENCOUNTER — PATIENT MESSAGE (OUTPATIENT)
Dept: INTERNAL MEDICINE | Facility: CLINIC | Age: 75
End: 2025-01-06
Payer: MEDICARE

## 2025-01-06 NOTE — TELEPHONE ENCOUNTER
Refill Decision Note   Dory Xie  is requesting a refill authorization.  Brief Assessment and Rationale for Refill:  Approve     Medication Therapy Plan:       Medication Reconciliation Completed: No   Comments:     No Care Gaps recommended.     Note composed:8:57 PM 01/05/2025

## 2025-02-11 ENCOUNTER — PATIENT MESSAGE (OUTPATIENT)
Dept: INTERNAL MEDICINE | Facility: CLINIC | Age: 75
End: 2025-02-11
Payer: MEDICARE

## 2025-04-18 ENCOUNTER — PATIENT MESSAGE (OUTPATIENT)
Dept: INTERNAL MEDICINE | Facility: CLINIC | Age: 75
End: 2025-04-18
Payer: MEDICARE

## 2025-04-23 DIAGNOSIS — M17.0 PRIMARY OSTEOARTHRITIS OF BOTH KNEES: ICD-10-CM

## 2025-04-23 RX ORDER — MELOXICAM 15 MG/1
TABLET ORAL
Qty: 90 TABLET | Refills: 1 | Status: SHIPPED | OUTPATIENT
Start: 2025-04-23

## 2025-04-23 NOTE — TELEPHONE ENCOUNTER
Refill Routing Note   Medication(s) are not appropriate for processing by [facility] for the following reason(s):        Outside of protocol    [facility] action(s):  Route               Appointments  past 12m or future 3m with PCP    Date Provider   Last Visit   11/14/2024 Makenna Davey MD   Next Visit   Visit date not found Makenna Davey MD   ED visits in past 90 days: 0        Note composed:8:50 AM 04/23/2025

## 2025-04-29 DIAGNOSIS — F32.1 MAJOR DEPRESSIVE DISORDER, SINGLE EPISODE, MODERATE: ICD-10-CM

## 2025-04-29 DIAGNOSIS — G62.9 NEUROPATHY: ICD-10-CM

## 2025-04-29 RX ORDER — GABAPENTIN 300 MG/1
300 CAPSULE ORAL 3 TIMES DAILY
Qty: 90 CAPSULE | Refills: 11 | Status: SHIPPED | OUTPATIENT
Start: 2025-04-29 | End: 2026-04-29

## 2025-04-29 RX ORDER — FLUOXETINE HYDROCHLORIDE 40 MG/1
40 CAPSULE ORAL DAILY
Qty: 90 CAPSULE | Refills: 1 | Status: SHIPPED | OUTPATIENT
Start: 2025-04-29

## 2025-04-29 NOTE — TELEPHONE ENCOUNTER
Care Due:                  Date            Visit Type   Department     Provider  --------------------------------------------------------------------------------                                ESTABLISHED                              PATIENT -    [facility]  Last Visit: 11-      [facility]       Makenna Davey  Next Visit: None Scheduled  None         None Found                                                            Last  Test          Frequency    Reason                     Performed    Due Date  --------------------------------------------------------------------------------    CBC.........  12 months..  meloxicam................  Not Found    Overdue    CMP.........  12 months..  meloxicam................  Not Found    Overdue    [employer] Embedded Care Due Messages. Reference number: [id number].   4/29/2025 3:27:48 PM CDT

## 2025-04-29 NOTE — TELEPHONE ENCOUNTER
Refill Routing Note   Medication(s) are not appropriate for processing by [facility] for the following reason(s):        Outside of protocol    [facility] action(s):  Route  Approve   Requires labs : Yes - CBC, CMP            Appointments  past 12m or future 3m with PCP    Date Provider   Last Visit   11/14/2024 Makenna Davey MD   Next Visit   Visit date not found Makenna Davey MD   ED visits in past 90 days: 0        Note composed:4:16 PM 04/29/2025

## 2025-07-28 DIAGNOSIS — Z00.00 ENCOUNTER FOR MEDICARE ANNUAL WELLNESS EXAM: ICD-10-CM
